# Patient Record
Sex: MALE | Race: WHITE | NOT HISPANIC OR LATINO | Employment: OTHER | URBAN - METROPOLITAN AREA
[De-identification: names, ages, dates, MRNs, and addresses within clinical notes are randomized per-mention and may not be internally consistent; named-entity substitution may affect disease eponyms.]

---

## 2017-01-06 ENCOUNTER — OFFICE VISIT (OUTPATIENT)
Dept: PULMONOLOGY | Facility: HOSPICE | Age: 72
End: 2017-01-06
Payer: MEDICARE

## 2017-01-06 VITALS
HEART RATE: 64 BPM | DIASTOLIC BLOOD PRESSURE: 82 MMHG | SYSTOLIC BLOOD PRESSURE: 130 MMHG | RESPIRATION RATE: 14 BRPM | WEIGHT: 166 LBS | BODY MASS INDEX: 25.16 KG/M2 | TEMPERATURE: 97.5 F | HEIGHT: 68 IN

## 2017-01-06 DIAGNOSIS — Z72.0 TOBACCO ABUSE: ICD-10-CM

## 2017-01-06 DIAGNOSIS — J43.2 CENTRILOBULAR EMPHYSEMA (HCC): ICD-10-CM

## 2017-01-06 DIAGNOSIS — G47.33 OSA (OBSTRUCTIVE SLEEP APNEA): ICD-10-CM

## 2017-01-06 PROCEDURE — 99406 BEHAV CHNG SMOKING 3-10 MIN: CPT | Performed by: NURSE PRACTITIONER

## 2017-01-06 PROCEDURE — 99214 OFFICE O/P EST MOD 30 MIN: CPT | Mod: 25 | Performed by: NURSE PRACTITIONER

## 2017-01-06 RX ORDER — DICLOFENAC SODIUM 75 MG/1
TABLET, DELAYED RELEASE ORAL
Refills: 6 | COMMUNITY
Start: 2016-10-20 | End: 2017-03-16

## 2017-01-06 RX ORDER — INFLUENZA A VIRUS A/BRISBANE/02/2018 IVR-190 (H1N1) ANTIGEN (FORMALDEHYDE INACTIVATED), INFLUENZA A VIRUS A/KANSAS/14/2017 X-327 (H3N2) ANTIGEN (FORMALDEHYDE INACTIVATED), INFLUENZA B VIRUS B/PHUKET/3073/2013 ANTIGEN (FORMALDEHYDE INACTIVATED), AND INFLUENZA B VIRUS B/MARYLAND/15/2016 BX-69A ANTIGEN (FORMALDEHYDE INACTIVATED) 15; 15; 15; 15 UG/.5ML; UG/.5ML; UG/.5ML; UG/.5ML
INJECTION, SUSPENSION INTRAMUSCULAR
COMMUNITY
Start: 2016-10-20 | End: 2017-07-06

## 2017-01-06 NOTE — PATIENT INSTRUCTIONS
1. Continue Symbicort 160/4.5 2 puffs BID, rinse mouth after use. Ok to call for samples.  2. Continue albuterol as needed,  3. Continue Spiriva daily  4. Referral to lung cancer screening program  5. Follow up in 6 months sooner if needed  6. Smoking cessation strongly encouraged

## 2017-01-06 NOTE — MR AVS SNAPSHOT
"        Fredy Gonsalez    2017 9:20 AM   Office Visit   MRN: [id number]    Department:  [facility]   Dept Phone:  [phone]    Description:  Male : 1945   Provider:  CHIVO Blanchard           Reason for Visit     Results CT      Allergies as of 2017     Allergen Noted Reactions    Diclofenac Sodium 2016   Unspecified    Made pt dizzy    Fish 2011         Vital Signs     Blood Pressure Pulse Temperature Respirations Height Weight    130/82 mmHg 64 36.4 °C (97.5 °F) (Temporal) 14 1.727 m (5' 8\") 75.297 kg (166 lb)    Body Mass Index Smoking Status                25.25 kg/m2 Current Every Day Smoker          Basic Information     Date Of Birth Sex Race Ethnicity Preferred Language    1945 Male White Non- English      Your appointments     2017  7:30 AM   Established Patient with Robert Lindo M.D.   [facility] ([facility])    [address] [facility] [id number] [phone]           You will be receiving a confirmation call a few days before your appointment from our automated call confirmation system.            2017  9:20 AM   Established Patient Pul with CHIVO Blanchard   [facility] (--)    [address] [facility] [address]  [facility] [id number] [phone]              Problem List              ICD-10-CM Priority Class Noted - Resolved    Arthritis M19.90   2011 - Present    Tobacco abuse Z72.0   2011 - Present    Hyperlipidemia E78.5   2012 - Present    COPD (chronic obstructive pulmonary disease) (HCC) J44.9   2012 - Present    AMIRA (obstructive sleep apnea) G47.33   2012 - Present    Dental caries K02.9   3/17/2015 - Present    Back pain M54.9   3/17/2015 - Present    Opioid type dependence, continuous (HCC) F11.20   2015 - Present    IFG (impaired fasting glucose) R73.01   3/18/2016 - Present    Chronic lumbar pain M54.5, G89.29   " 7/21/2016 - Present    Lung nodule < 6cm on CT R91.1   7/21/2016 - Present      Health Maintenance        Date Due Completion Dates    IMM ZOSTER VACCINE 11/13/2007 ---    IMM PNEUMOCOCCAL 65+ (ADULT) LOW/MEDIUM RISK SERIES (2 of 2 - PPSV23) 9/28/2016 9/28/2015    COLON CANCER SCREENING ANNUAL FIT 11/4/2017 11/4/2016 (Declined), 3/9/2015, 11/13/2013, 10/10/2012    Override on 11/4/2016: Patient Declined (pt decison)    IMM DTaP/Tdap/Td Vaccine (2 - Td) 12/30/2025 12/30/2015            Current Immunizations     13-VALENT PCV PREVNAR 9/28/2015    Influenza Vaccine Quad Inj (Preserved) 10/20/2016, 11/2/2015    Tdap Vaccine 12/30/2015      Below and/or attached are the medications your provider expects you to take. Review all of your home medications and newly ordered medications with your provider and/or pharmacist. Follow medication instructions as directed by your provider and/or pharmacist. Please keep your medication list with you and share with your provider. Update the information when medications are discontinued, doses are changed, or new medications (including over-the-counter products) are added; and carry medication information at all times in the event of emergency situations     Allergies:  DICLOFENAC SODIUM - Unspecified     FISH - (reactions not documented)               Medications  Valid as of: January 06, 2017 -  9:35 AM    Generic Name Brand Name Tablet Size Instructions for use    Albuterol Sulfate (Aero Soln) albuterol 108 (90 BASE) MCG/ACT Inhale 2 Puffs by mouth every four hours as needed for Shortness of Breath. Inhale 2 puffs every 4 hours as needed for shortness of breath.        Albuterol Sulfate (Aero Soln) albuterol 108 (90 BASE) MCG/ACT Inhale 2 Puffs by mouth every 6 hours as needed.        Budesonide-Formoterol Fumarate (Aerosol) SYMBICORT 160-4.5 MCG/ACT INHALE 2 PUFFS BY MOUTH TWICE DAILY        Diclofenac Sodium (Tablet Delayed Response) VOLTAREN 75 MG TK 1 T PO BID WF FOR ARTHRITIS  OR BACK PAIN        Fluticasone-Salmeterol (AEROSOL POWDER, BREATH ACTIVATED) ADVAIR DISKUS 250-50 MCG/DOSE INL 1 PUFF PO Q 12 H        Hydrocodone-Acetaminophen (Tab) NORCO 5-325 MG Take 1 Tab by mouth every 8 hours as needed. Prescription must last 30 days.        Influenza Vac Split Quad (Suspension) FLUZONE QUADRIVALENT          Meloxicam (Tab) MOBIC 7.5 MG Take 1 Tab by mouth every day.        Simvastatin (Tab) ZOCOR 40 MG Take 1 Tab by mouth every evening.        Tiotropium Bromide Monohydrate (Cap) SPIRIVA 18 MCG INHALE CONTENTS OF ONE CAPSULE BY MOUTH USING HANDIHALER        .                 Medicines prescribed today were sent to:     [facility] [address] [facility], [place] - [address]    [address] [facility] [address]    Phone: [phone] Fax: [phone]    Open 24 Hours?: Yes      Medication refill instructions:       If your prescription bottle indicates you have medication refills left, it is not necessary to call your provider’s office. Please contact your pharmacy and they will refill your medication.    If your prescription bottle indicates you do not have any refills left, you may request refills at any time through one of the following ways: The online [employer] system (except Urgent Care), by calling your provider’s office, or by asking your pharmacy to contact your provider’s office with a refill request. Medication refills are processed only during regular business hours and may not be available until the next business day. Your provider may request additional information or to have a follow-up visit with you prior to refilling your medication.   *Please Note: Medication refills are assigned a new Rx number when refilled electronically. Your pharmacy may indicate that no refills were authorized even though a new prescription for the same medication is available at the pharmacy. Please request the medicine by name with the pharmacy before contacting your  provider for a refill.        Other Notes About Your Plan     Pain contract signed 12/15/15  FALL RISK DONE 6/25/15 JAMAR Eller Status: Patient Declined

## 2017-01-06 NOTE — PROGRESS NOTES
Chief Complaint   Patient presents with   • Results     CT         HPI: This patient is a 71 y.o. male, who presents for CT results. Hx of COPD/emphysema, AMIRA, lung nodules. PFTs indicate an FEV1 of 2.64 L 86% predicted, FEV1 FVC ratio 63, TLC of 104% predicted, DLCO of 90% predicted with no significant bronchodilator response. He had a screening CT scan July 2014, which showed pulmonary nodules that have been followed by serial CTs.  CT scan September 13, 2016 shows a new ill-defined groundglass opacities and ground glass nodules. Emphysema also noted. Repeat CT scan to follow groundglass changes was completed on December 28, 2016 and show resolution of nodular groundglass opacity in the left lower lobe consistent with inflammatory process. All other nodules are stable ×2 years indicating benign process. The patient continues to smoke 1/2 pack a day. He has been counseled on smoking cessation in the past, this was again discussed with him today.  He has tried hypnosis twice, Chantix, nicotine patches, gum, he does not feel he is ready to quit at this time. He remains compliant with Symbicort 160/4.5, 2 puffs, twice a day, Spiriva daily and albuterol as needed. His breathing remains stable. Denies purulent cough, hemoptysis, wheeze, fever, chills, night sweats. Dyspnea remains baseline. He is up-to-date on influenza and pneumococcal vaccinations.    He has a history of AMIRA, intolerant to CPAP on 2L nocturnally, followed by Dr Hoover. I revisited the use of CPAP with him again today. He adamantly declines and will continue on oxygen nocturnally.    Past Medical History   Diagnosis Date   • Arthritis    • EMPHYSEMA    • Hyperlipidemia 4/17/2012   • COPD        Social History   Substance Use Topics   • Smoking status: Current Every Day Smoker -- 0.50 packs/day for 56 years     Types: Cigarettes   • Smokeless tobacco: Former User      Comment: down to 1 pack per week   • Alcohol Use: No      Comment: quit 28yrs ago  "      Family History   Problem Relation Age of Onset   • Arthritis Mother    • Lung Disease Mother    • Osteoporosis Mother    • Alzheimer's Disease Mother    • Other Mother      parkinsons   • Kidney Disease Father    • Cancer Neg Hx    • Diabetes Neg Hx    • Heart Disease Neg Hx    • Stroke Neg Hx        Current medications as of today   Current Outpatient Prescriptions   Medication Sig Dispense Refill   • diclofenac EC (VOLTAREN) 75 MG Tablet Delayed Response TK 1 T PO BID WF FOR ARTHRITIS OR BACK PAIN  6   • ADVAIR DISKUS 250-50 MCG/DOSE AEROSOL POWDER, BREATH ACTIVATED INL 1 PUFF PO Q 12 H  6   • FLUZONE QUADRIVALENT Suspension injection      • hydrocodone-acetaminophen (NORCO) 5-325 MG Tab per tablet Take 1 Tab by mouth every 8 hours as needed. Prescription must last 30 days. 90 Tab 0   • meloxicam (MOBIC) 7.5 MG Tab Take 1 Tab by mouth every day. 30 Tab 3   • albuterol (PROAIR HFA) 108 (90 BASE) MCG/ACT Aero Soln inhalation aerosol Inhale 2 Puffs by mouth every 6 hours as needed. 8.5 g 3   • tiotropium (SPIRIVA HANDIHALER) 18 MCG Cap INHALE CONTENTS OF ONE CAPSULE BY MOUTH USING HANDIHALER 30 Cap 6   • simvastatin (ZOCOR) 40 MG Tab Take 1 Tab by mouth every evening. 30 Tab 6   • albuterol 108 (90 BASE) MCG/ACT Aero Soln inhalation aerosol Inhale 2 Puffs by mouth every four hours as needed for Shortness of Breath. Inhale 2 puffs every 4 hours as needed for shortness of breath.     • budesonide-formoterol (SYMBICORT) 160-4.5 MCG/ACT Aerosol INHALE 2 PUFFS BY MOUTH TWICE DAILY 10.2 g 6     No current facility-administered medications for this visit.       Allergies: Diclofenac sodium and Fish    Blood pressure 130/82, pulse 64, temperature 36.4 °C (97.5 °F), temperature source Temporal, resp. rate 14, height 1.727 m (5' 8\"), weight 75.297 kg (166 lb).      ROS:   Constitutional: Denies fevers, chills, night sweats, weight loss. Positive for fatigue.  HEENT: Denies earache, difficulty hearing, tinnitus, nasal " congestion, hoarseness  Cardiovascular: Denies chest pain, tightness, palpitations, orthopnea or edema  Respiratory: See HPI  Sleep: Positive for restless sleep, frequent nocturnal awakenings  GI: Denies heartburn, dysphagia, nausea, abdominal pain, diarrhea or constipation  : Denies frequent urination, hematuria, discharge or painful urination  Musculoskeletal: Positive for chronic back pain.  Neurological: Denies weakness or headaches  Skin: No rashes    Physical exam:   Appearance: Well-nourished, well-developed, in no acute distress, cigarette odor  HEENT: Normocephalic, atraumatic, white sclera, PERRLA, oropharynx clear, poor dentition  Respiratory: no intercostal retractions or accessory muscle use   Lungs auscultation: Diminished breath sounds bilateral bases otherwise clear   Cardiovascular: Regular rate rhythm no murmurs, rubs or gallops  Gait: Normal  Digits: No clubbing, cyanosis. Positive for cigarette staining.  Motor: No focal deficits  Orientation: Oriented to time, person and place    Diagnosis:  1. Centrilobular emphysema (HCC)  REFERRAL TO LUNG CANCER SCREENING PROGRAM   2. Lung nodule < 6cm on CT  REFERRAL TO LUNG CANCER SCREENING PROGRAM   3. AMIRA (obstructive sleep apnea)     4. Tobacco abuse  REFERRAL TO LUNG CANCER SCREENING PROGRAM       Plan:  1. Continue Symbicort 160/4.5, 2 puffs, twice a day, rinse mouth after use. Patient will call for samples.  2. Continue Spiriva daily  3. Continue albuterol as needed  4. Patient is up-to-date on influenza and pneumococcal vaccinations  5. Smoking cessation again strongly encouraged  6. Patient declines use of CPAP, he will continue nocturnal oxygen at 2 L  7. Follow-up with PCP regularly. CT scan showed coronary artery calcification. Patient remains compliant with simvastatin daily.  8. Follow-up in 6 months, sooner if needed  9. I will refer patient to lung cancer screening program as he is a continued smoker he will require annual low-dose  screening CT. This was discussed with him today. He is amenable to this.

## 2017-01-12 ENCOUNTER — DOCUMENTATION (OUTPATIENT)
Dept: HEMATOLOGY ONCOLOGY | Facility: MEDICAL CENTER | Age: 72
End: 2017-01-12

## 2017-01-12 NOTE — NURSING NOTE
Received order for LCSP.  Chart review to assess for lung cancer screening program eligibility.   Age 55-80 yrs of age? Yes 71 y.o.  30 pack year hx of smoking, or greater? Yes 1 [id number]= 60 pkyr hx pt states he has smoked since 11 years old. States he used to smoke 3-4 packs per day but cut down to half a pack per day last year.   Current smoker? Yes    Any signs or symptoms of lung cancer? No   Previous history of lung cancer? No  Chest CT within past 12 mos.? Yes, chest CT performed on 12/28/2016  Patient does meet eligibility criteria, but not until 12/28/1017.

## 2017-01-20 ENCOUNTER — OFFICE VISIT (OUTPATIENT)
Dept: MEDICAL GROUP | Facility: MEDICAL CENTER | Age: 72
End: 2017-01-20
Attending: FAMILY MEDICINE
Payer: MEDICARE

## 2017-01-20 VITALS
SYSTOLIC BLOOD PRESSURE: 112 MMHG | RESPIRATION RATE: 18 BRPM | BODY MASS INDEX: 24.8 KG/M2 | DIASTOLIC BLOOD PRESSURE: 60 MMHG | HEART RATE: 87 BPM | TEMPERATURE: 97.9 F | WEIGHT: 163.6 LBS | HEIGHT: 68 IN | OXYGEN SATURATION: 95 %

## 2017-01-20 DIAGNOSIS — M54.50 CHRONIC MIDLINE LOW BACK PAIN WITHOUT SCIATICA: ICD-10-CM

## 2017-01-20 DIAGNOSIS — J43.1 PANLOBULAR EMPHYSEMA (HCC): ICD-10-CM

## 2017-01-20 DIAGNOSIS — G89.29 CHRONIC MIDLINE LOW BACK PAIN WITHOUT SCIATICA: ICD-10-CM

## 2017-01-20 DIAGNOSIS — M19.90 ARTHRITIS: ICD-10-CM

## 2017-01-20 DIAGNOSIS — E78.2 MIXED HYPERLIPIDEMIA: ICD-10-CM

## 2017-01-20 PROCEDURE — 99214 OFFICE O/P EST MOD 30 MIN: CPT | Performed by: FAMILY MEDICINE

## 2017-01-20 RX ORDER — BUDESONIDE AND FORMOTEROL FUMARATE DIHYDRATE 80; 4.5 UG/1; UG/1
2 AEROSOL RESPIRATORY (INHALATION) 2 TIMES DAILY
Qty: 1 INHALER | Refills: 11 | Status: SHIPPED | OUTPATIENT
Start: 2017-01-20 | End: 2019-03-11

## 2017-01-20 RX ORDER — ALBUTEROL SULFATE 90 UG/1
2 AEROSOL, METERED RESPIRATORY (INHALATION) EVERY 6 HOURS PRN
Qty: 8.5 G | Refills: 11 | Status: SHIPPED | OUTPATIENT
Start: 2017-01-20 | End: 2020-03-26 | Stop reason: SDUPTHER

## 2017-01-20 RX ORDER — HYDROCODONE BITARTRATE AND ACETAMINOPHEN 5; 325 MG/1; MG/1
1 TABLET ORAL EVERY 8 HOURS PRN
Qty: 90 TAB | Refills: 0 | Status: SHIPPED | OUTPATIENT
Start: 2017-01-20 | End: 2017-02-16 | Stop reason: SDUPTHER

## 2017-01-20 NOTE — ASSESSMENT & PLAN NOTE
Patient continues to report chronic bilateral lumbosacral pain without significant radiation into either leg. He reports if he walks a prolonged distance reflect his left leg will become weak but if he rests 5-10 minutes it recovers at strength. He is not reporting any numbness. There is no urinary or fecal incontinence. He is using Norco 5 mg about 3 times per day. Patient reports that his habit is to go for walks even though he has minimal back pain if he is very sedentary. Recent hip x-rays show mild arthritic changes in both hips along with both SI joints. In December patient had a normal ALEX and also had a lower extremity arterial scan which showed multiple areas of plaque but no significant stenotic sections.

## 2017-01-20 NOTE — MR AVS SNAPSHOT
"        Fredy Gonsalez JrBennett   2017 7:30 AM   Office Visit   MRN: [id number]    Department:  [facility]   Dept Phone:  [phone]    Description:  Male : 1945   Provider:  Robert Lindo M.D.           Reason for Visit     Results xray results/ follow up       Allergies as of 2017     Allergen Noted Reactions    Diclofenac Sodium 2016   Unspecified    Made pt dizzy    Fish 2011         You were diagnosed with     Panlobular emphysema (CMS-[facility])   [552816]       Mixed hyperlipidemia   [272.2.ICD-9-CM]       Chronic midline low back pain without sciatica   [9816391]       Arthritis   [812451]         Vital Signs     Blood Pressure Pulse Temperature Respirations Height Weight    112/60 mmHg 87 36.6 °C (97.9 °F) 18 1.727 m (5' 7.99\") 74.208 kg (163 lb 9.6 oz)    Body Mass Index Oxygen Saturation Smoking Status             24.88 kg/m2 95% Current Every Day Smoker         Basic Information     Date Of Birth Sex Race Ethnicity Preferred Language    1945 Male White Non- English      Your appointments     2017  7:30 AM   Established Patient with Robert Lindo M.D.   [facility] ([facility])    [address] [facility] [facility] [address] [address] [phone]           You will be receiving a confirmation call a few days before your appointment from our automated call confirmation system.            2017  9:20 AM   Established Patient Pul with CHIVO Blanchard   [facility] (--)    [address] [facility] [address]  [facility] [id number] [phone]              Problem List              ICD-10-CM Priority Class Noted - Resolved    Arthritis M19.90   2011 - Present    Tobacco abuse Z72.0   2011 - Present    Hyperlipidemia E78.5   2012 - Present    COPD (chronic obstructive pulmonary disease) (CMS-HCC) J44.9   2012 - Present    AMIRA (obstructive sleep apnea) G47.33   2012 - Present    Dental " caries K02.9   3/17/2015 - Present    Back pain M54.9   3/17/2015 - Present    Opioid type dependence, continuous (CMS-HCC) F11.20   6/25/2015 - Present    IFG (impaired fasting glucose) R73.01   3/18/2016 - Present    Chronic lumbar pain M54.5, G89.29   7/21/2016 - Present    Lung nodule < 6cm on CT R91.1   7/21/2016 - Present      Health Maintenance        Date Due Completion Dates    IMM ZOSTER VACCINE 11/13/2007 ---    IMM PNEUMOCOCCAL 65+ (ADULT) LOW/MEDIUM RISK SERIES (2 of 2 - PPSV23) 9/28/2016 9/28/2015    COLON CANCER SCREENING ANNUAL FIT 11/4/2017 11/4/2016 (Declined), 3/9/2015, 11/13/2013, 10/10/2012    Override on 11/4/2016: Patient Declined (pt decison)    IMM DTaP/Tdap/Td Vaccine (2 - Td) 12/30/2025 12/30/2015            Current Immunizations     13-VALENT PCV PREVNAR 9/28/2015    Influenza Vaccine Quad Inj (Preserved) 10/20/2016, 11/2/2015    Tdap Vaccine 12/30/2015      Below and/or attached are the medications your provider expects you to take. Review all of your home medications and newly ordered medications with your provider and/or pharmacist. Follow medication instructions as directed by your provider and/or pharmacist. Please keep your medication list with you and share with your provider. Update the information when medications are discontinued, doses are changed, or new medications (including over-the-counter products) are added; and carry medication information at all times in the event of emergency situations     Allergies:  DICLOFENAC SODIUM - Unspecified     FISH - (reactions not documented)               Medications  Valid as of: January 20, 2017 -  8:14 AM    Generic Name Brand Name Tablet Size Instructions for use    Albuterol Sulfate (Aero Soln) albuterol 108 (90 BASE) MCG/ACT Inhale 2 Puffs by mouth every four hours as needed for Shortness of Breath. Inhale 2 puffs every 4 hours as needed for shortness of breath.        Albuterol Sulfate (Aero Soln) albuterol 108 (90 BASE) MCG/ACT  Inhale 2 Puffs by mouth every 6 hours as needed.        Albuterol Sulfate (Aero Soln) albuterol 108 (90 BASE) MCG/ACT Inhale 2 Puffs by mouth every 6 hours as needed for Shortness of Breath.        Budesonide-Formoterol Fumarate (Aerosol) SYMBICORT 160-4.5 MCG/ACT INHALE 2 PUFFS BY MOUTH TWICE DAILY        Budesonide-Formoterol Fumarate (Aerosol) SYMBICORT 80-4.5 MCG/ACT Inhale 2 Puffs by mouth 2 Times a Day.        Diclofenac Sodium (Tablet Delayed Response) VOLTAREN 75 MG TK 1 T PO BID WF FOR ARTHRITIS OR BACK PAIN        Fluticasone-Salmeterol (AEROSOL POWDER, BREATH ACTIVATED) ADVAIR DISKUS 250-50 MCG/DOSE INL 1 PUFF PO Q 12 H        Hydrocodone-Acetaminophen (Tab) NORCO 5-325 MG Take 1 Tab by mouth every 8 hours as needed. Prescription must last 30 days.        Influenza Vac Split Quad (Suspension) FLUZONE QUADRIVALENT          Meloxicam (Tab) MOBIC 7.5 MG Take 1 Tab by mouth every day.        Simvastatin (Tab) ZOCOR 40 MG Take 1 Tab by mouth every evening.        Tiotropium Bromide Monohydrate (Cap) SPIRIVA 18 MCG INHALE CONTENTS OF ONE CAPSULE BY MOUTH USING HANDIHALER        .                 Medicines prescribed today were sent to:     [facility] [address] [place] [address] [address] [facility] [address]    Phone: [phone] Fax: [phone]    Open 24 Hours?: Yes      Medication refill instructions:       If your prescription bottle indicates you have medication refills left, it is not necessary to call your provider’s office. Please contact your pharmacy and they will refill your medication.    If your prescription bottle indicates you do not have any refills left, you may request refills at any time through one of the following ways: The online [employer] system (except Urgent Care), by calling your provider’s office, or by asking your pharmacy to contact your provider’s office with a refill request. Medication refills are processed only during  regular business hours and may not be available until the next business day. Your provider may request additional information or to have a follow-up visit with you prior to refilling your medication.   *Please Note: Medication refills are assigned a new Rx number when refilled electronically. Your pharmacy may indicate that no refills were authorized even though a new prescription for the same medication is available at the pharmacy. Please request the medicine by name with the pharmacy before contacting your provider for a refill.        Your To Do List     Future Labs/Procedures Complete By Expires    COMP METABOLIC PANEL  As directed 1/21/2018    LIPID PROFILE  As directed 1/21/2018      Other Notes About Your Plan     Pain contract signed 12/15/15  FALL RISK DONE 6/25/15 JAMAR Eller Status: Patient Declined

## 2017-01-20 NOTE — ASSESSMENT & PLAN NOTE
Patient is compliant taking simvastatin 40 mg daily. He is not reporting any chest pain or chest pressure. Last lipid panel 11 months ago showed excellent results. This will be updated

## 2017-01-20 NOTE — PROGRESS NOTES
Chief Complaint   Patient presents with   • Results     xray results/ follow up        HISTORY OF PRESENT ILLNESS: Patient is a 71 y.o. male established patient who presents today to follow-up on chronic low back pain exacerbated by exercise, hyperlipidemia, COPD/tobacco abuse        Chronic lumbar pain  Patient continues to report chronic bilateral lumbosacral pain without significant radiation into either leg. He reports if he walks a prolonged distance reflect his left leg will become weak but if he rests 5-10 minutes it recovers at strength. He is not reporting any numbness. There is no urinary or fecal incontinence. He is using Norco 5 mg about 3 times per day. Patient reports that his habit is to go for walks even though he has minimal back pain if he is very sedentary. Recent hip x-rays show mild arthritic changes in both hips along with both SI joints. In December patient had a normal ALEX and also had a lower extremity arterial scan which showed multiple areas of plaque but no significant stenotic sections.    Hyperlipidemia  Patient is compliant taking simvastatin 40 mg daily. He is not reporting any chest pain or chest pressure. Last lipid panel 11 months ago showed excellent results. This will be updated    COPD (chronic obstructive pulmonary disease)  Patient currently has had no luck reducing cigarette consumption down from 10 cigarettes per day. He is very uncomfortable wearing a CPAP mask and so is only wearing nighttime oxygen. He reports that he'll wake up several times a night only to urinate not due to dyspnea. He does have a daily cough with no current sputum production and no hemoptysis. His Advair and pro-air are going off formulary and will be replaced. He does not need daytime oxygen.      Patient Active Problem List    Diagnosis Date Noted   • Chronic lumbar pain 07/21/2016   • Lung nodule < 6cm on CT 07/21/2016   • IFG (impaired fasting glucose) 03/18/2016   • Opioid type dependence,  continuous (CMS-HCC) 06/25/2015   • Dental caries 03/17/2015   • Back pain 03/17/2015   • Hyperlipidemia 04/17/2012   • COPD (chronic obstructive pulmonary disease) (CMS-HCC) 04/17/2012   • AMIRA (obstructive sleep apnea) 04/17/2012   • Arthritis 11/11/2011   • Tobacco abuse 11/11/2011     Social history-single, not working  Allergies:Diclofenac sodium and Fish    Current Outpatient Prescriptions   Medication Sig Dispense Refill   • budesonide-formoterol (SYMBICORT) 80-4.5 MCG/ACT Aerosol Inhale 2 Puffs by mouth 2 Times a Day. 1 Inhaler 11   • albuterol 108 (90 BASE) MCG/ACT Aero Soln inhalation aerosol Inhale 2 Puffs by mouth every 6 hours as needed for Shortness of Breath. 8.5 g 11   • hydrocodone-acetaminophen (NORCO) 5-325 MG Tab per tablet Take 1 Tab by mouth every 8 hours as needed. Prescription must last 30 days. 90 Tab 0   • budesonide-formoterol (SYMBICORT) 160-4.5 MCG/ACT Aerosol INHALE 2 PUFFS BY MOUTH TWICE DAILY 10.2 g 6   • diclofenac EC (VOLTAREN) 75 MG Tablet Delayed Response TK 1 T PO BID WF FOR ARTHRITIS OR BACK PAIN  6   • ADVAIR DISKUS 250-50 MCG/DOSE AEROSOL POWDER, BREATH ACTIVATED INL 1 PUFF PO Q 12 H  6   • FLUZONE QUADRIVALENT Suspension injection      • meloxicam (MOBIC) 7.5 MG Tab Take 1 Tab by mouth every day. 30 Tab 3   • albuterol (PROAIR HFA) 108 (90 BASE) MCG/ACT Aero Soln inhalation aerosol Inhale 2 Puffs by mouth every 6 hours as needed. 8.5 g 3   • tiotropium (SPIRIVA HANDIHALER) 18 MCG Cap INHALE CONTENTS OF ONE CAPSULE BY MOUTH USING HANDIHALER 30 Cap 6   • simvastatin (ZOCOR) 40 MG Tab Take 1 Tab by mouth every evening. 30 Tab 6   • albuterol 108 (90 BASE) MCG/ACT Aero Soln inhalation aerosol Inhale 2 Puffs by mouth every four hours as needed for Shortness of Breath. Inhale 2 puffs every 4 hours as needed for shortness of breath.       No current facility-administered medications for this visit.       Social History   Substance Use Topics   • Smoking status: Current Every Day  "Smoker -- 0.50 packs/day for 56 years     Types: Cigarettes   • Smokeless tobacco: Former User      Comment: down to 1 pack per week   • Alcohol Use: No      Comment: quit 28yrs ago       Family History   Problem Relation Age of Onset   • Arthritis Mother    • Lung Disease Mother    • Osteoporosis Mother    • Alzheimer's Disease Mother    • Other Mother      parkinsons   • Kidney Disease Father    • Cancer Neg Hx    • Diabetes Neg Hx    • Heart Disease Neg Hx    • Stroke Neg Hx        ROS:  Review of Systems   Constitutional: Negative for fever, chills, weight loss and malaise/fatigue.   Eyes: Negative for blurred vision.   Respiratory: Negative for cough, sputum production, shortness of breath and wheezing.    Cardiovascular: Negative for chest pain, palpitations, orthopnea and leg swelling.   Gastrointestinal: Negative for heartburn, nausea, vomiting and abdominal pain.   Endo/Heme/Allergies: Does not bruise/bleed easily.               Exam:  Blood pressure 112/60, pulse 87, temperature 36.6 °C (97.9 °F), resp. rate 18, height 1.727 m (5' 7.99\"), weight 74.208 kg (163 lb 9.6 oz), SpO2 95 %.  General:  Well nourished, well developed male in NAD  Head is grossly normal.  Neck: Supple without JVD or bruit. Thyroid is not enlarged. Trachea is midline.  Pulmonary: Clear to ausculation .  Normal effort. No rales, ronchi, or wheezing.  Cardiovascular: Regular rate and rhythm without murmur.  Abdomen-Abdomen is soft, normal bowel sounds, no masses, guarding, ororganomegaly, or tenderness.  Lower extremities- neg for pretibial edema, redness, tenderness.      Please note that this dictation was created using voice recognition software. I have made every reasonable attempt to correct obvious errors, but I expect that there are errors of grammar and possibly content that I did not discover before finalizing the note.    Assessment/Plan:  1. Panlobular emphysema (CMS-HCC)     2. Mixed hyperlipidemia  COMP METABOLIC PANEL    " LIPID PROFILE   3. Chronic midline low back pain without sciatica     4. Arthritis  hydrocodone-acetaminophen (NORCO) 5-325 MG Tab per tablet      Plan: 1. Rx Symbicort 80/4.52 puff twice daily  2. Rx Ventolin albuterol inhaler to replace Pro Air  3. Patient is encouraged to reduce down to 9 cigarettes per day  4. Fasting lipids and CMP prior to next visit-1 month Nexium OTC 24-hour brand only the right

## 2017-01-20 NOTE — ASSESSMENT & PLAN NOTE
Patient currently has had no luck reducing cigarette consumption down from 10 cigarettes per day. He is very uncomfortable wearing a CPAP mask and so is only wearing nighttime oxygen. He reports that he'll wake up several times a night only to urinate not due to dyspnea. He does have a daily cough with no current sputum production and no hemoptysis. His Advair and pro-air are going off formulary and will be replaced. He does not need daytime oxygen.

## 2017-01-25 ENCOUNTER — HOSPITAL ENCOUNTER (OUTPATIENT)
Dept: LAB | Facility: MEDICAL CENTER | Age: 72
End: 2017-01-25
Attending: FAMILY MEDICINE
Payer: MEDICARE

## 2017-01-25 DIAGNOSIS — E78.2 MIXED HYPERLIPIDEMIA: ICD-10-CM

## 2017-01-25 LAB
ALBUMIN SERPL BCP-MCNC: 4.2 G/DL (ref 3.2–4.9)
ALBUMIN/GLOB SERPL: 1.4 G/DL
ALP SERPL-CCNC: 55 U/L (ref 30–99)
ALT SERPL-CCNC: 13 U/L (ref 2–50)
ANION GAP SERPL CALC-SCNC: 3 MMOL/L (ref 0–11.9)
AST SERPL-CCNC: 12 U/L (ref 12–45)
BILIRUB SERPL-MCNC: 0.4 MG/DL (ref 0.1–1.5)
BUN SERPL-MCNC: 16 MG/DL (ref 8–22)
CALCIUM SERPL-MCNC: 9.2 MG/DL (ref 8.5–10.5)
CHLORIDE SERPL-SCNC: 109 MMOL/L (ref 96–112)
CHOLEST SERPL-MCNC: 143 MG/DL (ref 100–199)
CO2 SERPL-SCNC: 28 MMOL/L (ref 20–33)
CREAT SERPL-MCNC: 1.12 MG/DL (ref 0.5–1.4)
GLOBULIN SER CALC-MCNC: 3.1 G/DL (ref 1.9–3.5)
GLUCOSE SERPL-MCNC: 117 MG/DL (ref 65–99)
HDLC SERPL-MCNC: 41 MG/DL
LDLC SERPL CALC-MCNC: 92 MG/DL
POTASSIUM SERPL-SCNC: 4.4 MMOL/L (ref 3.6–5.5)
PROT SERPL-MCNC: 7.3 G/DL (ref 6–8.2)
SODIUM SERPL-SCNC: 140 MMOL/L (ref 135–145)
TRIGL SERPL-MCNC: 52 MG/DL (ref 0–149)

## 2017-01-25 PROCEDURE — 80053 COMPREHEN METABOLIC PANEL: CPT

## 2017-01-25 PROCEDURE — 80061 LIPID PANEL: CPT

## 2017-01-25 PROCEDURE — 36415 COLL VENOUS BLD VENIPUNCTURE: CPT

## 2017-01-27 ENCOUNTER — TELEPHONE (OUTPATIENT)
Dept: MEDICAL GROUP | Facility: MEDICAL CENTER | Age: 72
End: 2017-01-27

## 2017-01-28 NOTE — TELEPHONE ENCOUNTER
Phone Number Called: [phone] (home)       Message: Pt informed: Laboratory is notable for modest elevation of sugar at 117 this is been seen on occasion in the past. Would be careful about avoiding sweets and limiting quantities of starches. Cholesterol is very good at 143 LDL good at 92. Normal liver and kidney function    Left Message for patient to call back: N\A

## 2017-01-31 ENCOUNTER — TELEPHONE (OUTPATIENT)
Dept: HEMATOLOGY ONCOLOGY | Facility: MEDICAL CENTER | Age: 72
End: 2017-01-31

## 2017-01-31 RX ORDER — BUDESONIDE AND FORMOTEROL FUMARATE DIHYDRATE 160; 4.5 UG/1; UG/1
AEROSOL RESPIRATORY (INHALATION)
Qty: 10.2 G | Refills: 3 | Status: SHIPPED | OUTPATIENT
Start: 2017-01-31 | End: 2019-03-11

## 2017-01-31 NOTE — TELEPHONE ENCOUNTER
Left voicemail for patient requesting a return call to schedule shared decision making visit for lung screening program with GABBIE Schneider. Ref:Adriana Landers, Dx:Centrilobular emphysema ,Lung nodule Tobacco abuse , If and When patient call please contact me ( Kanwal Yusuf MA [phone])

## 2017-02-16 ENCOUNTER — OFFICE VISIT (OUTPATIENT)
Dept: MEDICAL GROUP | Facility: MEDICAL CENTER | Age: 72
End: 2017-02-16
Attending: FAMILY MEDICINE
Payer: MEDICARE

## 2017-02-16 ENCOUNTER — OFFICE VISIT (OUTPATIENT)
Dept: HEMATOLOGY ONCOLOGY | Facility: MEDICAL CENTER | Age: 72
End: 2017-02-16
Payer: MEDICARE

## 2017-02-16 VITALS
BODY MASS INDEX: 26.23 KG/M2 | RESPIRATION RATE: 12 BRPM | SYSTOLIC BLOOD PRESSURE: 140 MMHG | DIASTOLIC BLOOD PRESSURE: 68 MMHG | TEMPERATURE: 97.9 F | HEART RATE: 75 BPM | HEIGHT: 67 IN | WEIGHT: 167.1 LBS | OXYGEN SATURATION: 97 %

## 2017-02-16 VITALS
DIASTOLIC BLOOD PRESSURE: 72 MMHG | SYSTOLIC BLOOD PRESSURE: 104 MMHG | RESPIRATION RATE: 16 BRPM | WEIGHT: 167 LBS | HEART RATE: 84 BPM | OXYGEN SATURATION: 97 % | TEMPERATURE: 97.3 F | HEIGHT: 68 IN | BODY MASS INDEX: 25.31 KG/M2

## 2017-02-16 DIAGNOSIS — Z72.0 TOBACCO ABUSE: ICD-10-CM

## 2017-02-16 DIAGNOSIS — G89.29 CHRONIC HIP PAIN, UNSPECIFIED LATERALITY: ICD-10-CM

## 2017-02-16 DIAGNOSIS — M19.90 ARTHRITIS: ICD-10-CM

## 2017-02-16 DIAGNOSIS — F17.210 CIGARETTE SMOKER: ICD-10-CM

## 2017-02-16 DIAGNOSIS — J43.1 PANLOBULAR EMPHYSEMA (HCC): ICD-10-CM

## 2017-02-16 DIAGNOSIS — M25.552 BILATERAL HIP PAIN: ICD-10-CM

## 2017-02-16 DIAGNOSIS — M25.559 CHRONIC HIP PAIN, UNSPECIFIED LATERALITY: ICD-10-CM

## 2017-02-16 DIAGNOSIS — M25.551 BILATERAL HIP PAIN: ICD-10-CM

## 2017-02-16 PROCEDURE — 99214 OFFICE O/P EST MOD 30 MIN: CPT | Performed by: FAMILY MEDICINE

## 2017-02-16 PROCEDURE — 1101F PT FALLS ASSESS-DOCD LE1/YR: CPT | Performed by: FAMILY MEDICINE

## 2017-02-16 PROCEDURE — G0296 VISIT TO DETERM LDCT ELIG: HCPCS | Performed by: NURSE PRACTITIONER

## 2017-02-16 PROCEDURE — 4004F PT TOBACCO SCREEN RCVD TLK: CPT | Performed by: FAMILY MEDICINE

## 2017-02-16 PROCEDURE — 3017F COLORECTAL CA SCREEN DOC REV: CPT | Mod: 1P | Performed by: FAMILY MEDICINE

## 2017-02-16 PROCEDURE — G8420 CALC BMI NORM PARAMETERS: HCPCS | Performed by: FAMILY MEDICINE

## 2017-02-16 PROCEDURE — 4040F PNEUMOC VAC/ADMIN/RCVD: CPT | Performed by: FAMILY MEDICINE

## 2017-02-16 PROCEDURE — 99213 OFFICE O/P EST LOW 20 MIN: CPT | Performed by: FAMILY MEDICINE

## 2017-02-16 PROCEDURE — G8432 DEP SCR NOT DOC, RNG: HCPCS | Performed by: FAMILY MEDICINE

## 2017-02-16 PROCEDURE — G8482 FLU IMMUNIZE ORDER/ADMIN: HCPCS | Performed by: FAMILY MEDICINE

## 2017-02-16 RX ORDER — HYDROCODONE BITARTRATE AND ACETAMINOPHEN 5; 325 MG/1; MG/1
1 TABLET ORAL EVERY 8 HOURS PRN
Qty: 90 TAB | Refills: 0 | Status: SHIPPED | OUTPATIENT
Start: 2017-02-16 | End: 2017-03-16 | Stop reason: SDUPTHER

## 2017-02-16 ASSESSMENT — ENCOUNTER SYMPTOMS
CHILLS: 0
FEVER: 0
WEIGHT LOSS: 0
HEMOPTYSIS: 0
WHEEZING: 0
SPUTUM PRODUCTION: 0
COUGH: 1
SHORTNESS OF BREATH: 1

## 2017-02-16 ASSESSMENT — PAIN SCALES - GENERAL
PAINLEVEL: 3=SLIGHT PAIN
PAINLEVEL: NO PAIN

## 2017-02-16 NOTE — ASSESSMENT & PLAN NOTE
Patient reports bilateral hip pain if he stands for more than 5-10 minutes or walks more than 2-3 blocks. Reports his exercise tolerance is slowly decreasing. Reports the left hip is perhaps slightly worse than the right. He has previously had a lumbar MRI which did show some moderate central spinal canal stenosis and moderate bilateral foraminal stenosis at the L4-5 level on a MRI in 2015 ordered by Dr. Busch. He had epidural steroids injections which did not help and a course of PT. More recently has had a normal ankle-brachial index and a arterial ultrasound showing only mild plaque and stenosis. Plain hip x-rays are notable for mild to moderate changes of the lateral hip arthritic change. Reports pain will be from his lower back consistently just around to the lateral aspect of both hip. Does not report pain with activity either in the muscles of his calves or thighs. Is not reporting lower leg numbness, urinary incontinence, fecal incontinence.

## 2017-02-16 NOTE — ASSESSMENT & PLAN NOTE
Patient has dramatically reduced his tobacco dependence is now at 4 cigarettes per day. Notes some early morning cough with some clear sputum production but no significant wheezing and no hemoptysis.

## 2017-02-16 NOTE — MR AVS SNAPSHOT
"        Fredy Gonsalez    2017 7:30 AM   Office Visit   MRN: [id number]    Department:  [facility]   Dept Phone:  [phone]    Description:  Male : 1945   Provider:  Robert Lindo M.D.           Reason for Visit     Follow-Up           Allergies as of 2017     Allergen Noted Reactions    Diclofenac Sodium 2016   Unspecified    Made pt dizzy    Fish 2011         You were diagnosed with     Chronic hip pain, unspecified laterality   [920425]       Panlobular emphysema (CMS-HCC)   [727693]       Tobacco abuse   [758456]       Bilateral hip pain   [949077]       Arthritis   [894331]         Vital Signs     Blood Pressure Pulse Temperature Respirations Height Weight    104/72 mmHg 84 36.3 °C (97.3 °F) 16 1.727 m (5' 7.99\") 75.751 kg (167 lb)    Body Mass Index Oxygen Saturation Smoking Status             25.40 kg/m2 97% Current Every Day Smoker         Basic Information     Date Of Birth Sex Race Ethnicity Preferred Language    1945 Male White Non- English      Your appointments     2017  9:00 AM   Int Lung CA Screen with CHIVO Schneider   [facility] (--)    [address], [address]  [facility] [address] [phone]            Mar 16, 2017  7:30 AM   Established Patient with Robert Lindo M.D.   [facility] ([facility])    [address]  [facility] [address] [phone]           You will be receiving a confirmation call a few days before your appointment from our automated call confirmation system.            2017  9:20 AM   Established Patient Pul with CHIVO Blanchard   [facility] (--)    [address]  [address]  [facility] [address] [phone]              Problem List              ICD-10-CM Priority Class Noted - Resolved    Arthritis M19.90   2011 - Present    Tobacco abuse Z72.0   2011 - Present    Hyperlipidemia E78.5   2012 - " Present    COPD (chronic obstructive pulmonary disease) (CMS-HCC) J44.9   4/17/2012 - Present    AMIRA (obstructive sleep apnea) G47.33   4/17/2012 - Present    Dental caries K02.9   3/17/2015 - Present    Back pain M54.9   3/17/2015 - Present    Opioid type dependence, continuous (CMS-HCC) F11.20   6/25/2015 - Present    IFG (impaired fasting glucose) R73.01   3/18/2016 - Present    Chronic lumbar pain M54.5, G89.29   7/21/2016 - Present    Lung nodule < 6cm on CT R91.1   7/21/2016 - Present    Bilateral hip pain M25.551, M25.552   2/16/2017 - Present      Health Maintenance        Date Due Completion Dates    IMM ZOSTER VACCINE 11/13/2007 ---    IMM PNEUMOCOCCAL 65+ (ADULT) LOW/MEDIUM RISK SERIES (2 of 2 - PPSV23) 9/28/2016 9/28/2015    COLON CANCER SCREENING ANNUAL FIT 11/4/2017 11/4/2016 (Declined), 3/9/2015, 11/13/2013, 10/10/2012    Override on 11/4/2016: Patient Declined (pt decison)    IMM DTaP/Tdap/Td Vaccine (2 - Td) 12/30/2025 12/30/2015            Current Immunizations     13-VALENT PCV PREVNAR 9/28/2015    Influenza Vaccine Quad Inj (Preserved) 10/20/2016, 11/2/2015    Tdap Vaccine 12/30/2015      Below and/or attached are the medications your provider expects you to take. Review all of your home medications and newly ordered medications with your provider and/or pharmacist. Follow medication instructions as directed by your provider and/or pharmacist. Please keep your medication list with you and share with your provider. Update the information when medications are discontinued, doses are changed, or new medications (including over-the-counter products) are added; and carry medication information at all times in the event of emergency situations     Allergies:  DICLOFENAC SODIUM - Unspecified     FISH - (reactions not documented)               Medications  Valid as of: February 16, 2017 -  7:59 AM    Generic Name Brand Name Tablet Size Instructions for use    Albuterol Sulfate (Aero Soln) albuterol 108  (90 BASE) MCG/ACT Inhale 2 Puffs by mouth every four hours as needed for Shortness of Breath. Inhale 2 puffs every 4 hours as needed for shortness of breath.        Albuterol Sulfate (Aero Soln) albuterol 108 (90 BASE) MCG/ACT Inhale 2 Puffs by mouth every 6 hours as needed.        Albuterol Sulfate (Aero Soln) albuterol 108 (90 BASE) MCG/ACT Inhale 2 Puffs by mouth every 6 hours as needed for Shortness of Breath.        Budesonide-Formoterol Fumarate (Aerosol) SYMBICORT 80-4.5 MCG/ACT Inhale 2 Puffs by mouth 2 Times a Day.        Budesonide-Formoterol Fumarate (Aerosol) SYMBICORT 160-4.5 MCG/ACT INHALE 2 PUFFS BY MOUTH TWICE DAILY        Diclofenac Sodium (Tablet Delayed Response) VOLTAREN 75 MG TK 1 T PO BID WF FOR ARTHRITIS OR BACK PAIN        Fluticasone-Salmeterol (AEROSOL POWDER, BREATH ACTIVATED) ADVAIR DISKUS 250-50 MCG/DOSE INL 1 PUFF PO Q 12 H        Hydrocodone-Acetaminophen (Tab) NORCO 5-325 MG Take 1 Tab by mouth every 8 hours as needed. Prescription must last 30 days.        Influenza Vac Split Quad (Suspension) FLUZONE QUADRIVALENT          Meloxicam (Tab) MOBIC 7.5 MG Take 1 Tab by mouth every day.        Simvastatin (Tab) ZOCOR 40 MG Take 1 Tab by mouth every evening.        Tiotropium Bromide Monohydrate (Cap) SPIRIVA 18 MCG INHALE CONTENTS OF ONE CAPSULE BY MOUTH USING HANDIHALER        .                 Medicines prescribed today were sent to:     [facility] [address] [place] [address] [address] [facility]    [address] [facility] [address]    Phone: [phone] Fax: [phone]    Open 24 Hours?: Yes      Medication refill instructions:       If your prescription bottle indicates you have medication refills left, it is not necessary to call your provider’s office. Please contact your pharmacy and they will refill your medication.    If your prescription bottle indicates you do not have any refills left, you may request refills at any time through one of the  following ways: The online [employer] system (except Urgent Care), by calling your provider’s office, or by asking your pharmacy to contact your provider’s office with a refill request. Medication refills are processed only during regular business hours and may not be available until the next business day. Your provider may request additional information or to have a follow-up visit with you prior to refilling your medication.   *Please Note: Medication refills are assigned a new Rx number when refilled electronically. Your pharmacy may indicate that no refills were authorized even though a new prescription for the same medication is available at the pharmacy. Please request the medicine by name with the pharmacy before contacting your provider for a refill.        Referral     A referral request has been sent to our patient care coordination department. Please allow 3-5 business days for us to process this request and contact you either by phone or mail. If you do not hear from us by the 5th business day, please call us at [phone].        Other Notes About Your Plan     Pain contract signed 12/15/15  FALL RISK DONE 6/25/15 RB           [employer] Status: Patient Declined

## 2017-02-16 NOTE — MR AVS SNAPSHOT
"        Fredy Gonsalez JrBennett   2017 9:00 AM   Office Visit   MRN: [id number]    Department:  [facility]   Dept Phone:  [phone]    Description:  Male : 1945   Provider:  CHIVO Schneider           Reason for Visit     New Patient lung screening      Allergies as of 2017     Allergen Noted Reactions    Diclofenac Sodium 2016   Unspecified    Made pt dizzy    Fish 2011         Vital Signs     Blood Pressure Pulse Temperature Respirations Height Weight    140/68 mmHg 75 36.6 °C (97.9 °F) 12 1.702 m (5' 7\") 75.796 kg (167 lb 1.6 oz)    Body Mass Index Oxygen Saturation Smoking Status             26.17 kg/m2 97% Current Every Day Smoker         Basic Information     Date Of Birth Sex Race Ethnicity Preferred Language    1945 Male White Non- English      Your appointments     2017  9:00 AM   Int Lung CA Screen with CHIVO Schneider   [facility] (--)    [address] [facility] [address]  [facility] [address] [phone]            Mar 16, 2017  7:30 AM   Established Patient with Robert Lindo M.D.   [facility] ([facility])    [address] [facility] [id number] [phone]           You will be receiving a confirmation call a few days before your appointment from our automated call confirmation system.            2017  9:20 AM   Established Patient Pul with CHIVO Blanchard   [facility] (--)    [address]  [facility] [address]  [facility] [address] [phone]              Problem List              ICD-10-CM Priority Class Noted - Resolved    Arthritis M19.90   2011 - Present    Tobacco abuse Z72.0   2011 - Present    Hyperlipidemia E78.5   2012 - Present    COPD (chronic obstructive pulmonary disease) (CMS-HCC) J44.9   2012 - Present    AMIRA (obstructive sleep apnea) G47.33   2012 - Present    Dental caries K02.9   3/17/2015 - Present    Back pain M54.9   " 3/17/2015 - Present    Opioid type dependence, continuous (CMS-HCC) F11.20   6/25/2015 - Present    IFG (impaired fasting glucose) R73.01   3/18/2016 - Present    Chronic lumbar pain M54.5, G89.29   7/21/2016 - Present    Lung nodule < 6cm on CT R91.1   7/21/2016 - Present    Bilateral hip pain M25.551, M25.552   2/16/2017 - Present      Health Maintenance        Date Due Completion Dates    IMM ZOSTER VACCINE 11/13/2007 ---    IMM PNEUMOCOCCAL 65+ (ADULT) LOW/MEDIUM RISK SERIES (2 of 2 - PPSV23) 9/28/2016 9/28/2015    COLON CANCER SCREENING ANNUAL FIT 11/4/2017 11/4/2016 (Declined), 3/9/2015, 11/13/2013, 10/10/2012    Override on 11/4/2016: Patient Declined (pt decison)    IMM DTaP/Tdap/Td Vaccine (2 - Td) 12/30/2025 12/30/2015            Current Immunizations     13-VALENT PCV PREVNAR 9/28/2015    Influenza Vaccine Quad Inj (Preserved) 10/20/2016, 11/2/2015    Tdap Vaccine 12/30/2015      Below and/or attached are the medications your provider expects you to take. Review all of your home medications and newly ordered medications with your provider and/or pharmacist. Follow medication instructions as directed by your provider and/or pharmacist. Please keep your medication list with you and share with your provider. Update the information when medications are discontinued, doses are changed, or new medications (including over-the-counter products) are added; and carry medication information at all times in the event of emergency situations     Allergies:  DICLOFENAC SODIUM - Unspecified     FISH - (reactions not documented)               Medications  Valid as of: February 16, 2017 -  8:37 AM    Generic Name Brand Name Tablet Size Instructions for use    Albuterol Sulfate (Aero Soln) albuterol 108 (90 BASE) MCG/ACT Inhale 2 Puffs by mouth every four hours as needed for Shortness of Breath. Inhale 2 puffs every 4 hours as needed for shortness of breath.        Albuterol Sulfate (Aero Soln) albuterol 108 (90 BASE)  MCG/ACT Inhale 2 Puffs by mouth every 6 hours as needed.        Albuterol Sulfate (Aero Soln) albuterol 108 (90 BASE) MCG/ACT Inhale 2 Puffs by mouth every 6 hours as needed for Shortness of Breath.        Budesonide-Formoterol Fumarate (Aerosol) SYMBICORT 80-4.5 MCG/ACT Inhale 2 Puffs by mouth 2 Times a Day.        Budesonide-Formoterol Fumarate (Aerosol) SYMBICORT 160-4.5 MCG/ACT INHALE 2 PUFFS BY MOUTH TWICE DAILY        Diclofenac Sodium (Tablet Delayed Response) VOLTAREN 75 MG TK 1 T PO BID WF FOR ARTHRITIS OR BACK PAIN        Fluticasone-Salmeterol (AEROSOL POWDER, BREATH ACTIVATED) ADVAIR DISKUS 250-50 MCG/DOSE INL 1 PUFF PO Q 12 H        Hydrocodone-Acetaminophen (Tab) NORCO 5-325 MG Take 1 Tab by mouth every 8 hours as needed. Prescription must last 30 days.        Influenza Vac Split Quad (Suspension) FLUZONE QUADRIVALENT          Meloxicam (Tab) MOBIC 7.5 MG Take 1 Tab by mouth every day.        Simvastatin (Tab) ZOCOR 40 MG Take 1 Tab by mouth every evening.        Tiotropium Bromide Monohydrate (Cap) SPIRIVA 18 MCG INHALE CONTENTS OF ONE CAPSULE BY MOUTH USING HANDIHALER        .                 Medicines prescribed today were sent to:     [facility] [address] [place] [address] [address] [facility] [address]    Phone: [phone] Fax: [phone]    Open 24 Hours?: Yes      Medication refill instructions:       If your prescription bottle indicates you have medication refills left, it is not necessary to call your provider’s office. Please contact your pharmacy and they will refill your medication.    If your prescription bottle indicates you do not have any refills left, you may request refills at any time through one of the following ways: The online [employer] system (except Urgent Care), by calling your provider’s office, or by asking your pharmacy to contact your provider’s office with a refill request. Medication refills are processed only  during regular business hours and may not be available until the next business day. Your provider may request additional information or to have a follow-up visit with you prior to refilling your medication.   *Please Note: Medication refills are assigned a new Rx number when refilled electronically. Your pharmacy may indicate that no refills were authorized even though a new prescription for the same medication is available at the pharmacy. Please request the medicine by name with the pharmacy before contacting your provider for a refill.        Other Notes About Your Plan     Pain contract signed 12/15/15  FALL RISK DONE 6/25/15 JAMAR Eller Status: Patient Declined

## 2017-02-16 NOTE — PROGRESS NOTES
Subjective:   Date of Consultation:2/16/2017  8:24 AM  Primary care physician:Robert Lindo M.D.    Thank you for allowing me to participate in his care, I will continue to follow closely.   Patient seen today for initial lung cancer screening visit. Patient referred by Adriana CARTWRIGHT.    The patient meets eligibility criteria including age, smoking history (30+ pack years), if former smoker, quit in the last 15 years, and absence of signs or symptoms of lung cancer.    - Age - 71  - Smoking history - Patient has smoked for 60 years at an average of 1 ppd = 60 pack year smoking history.  - Current smoking status - Current smoker.  Patient has tried multiple interventions but none were effective.  - No symptoms of lung cancer and no previous history of lung cancer           Past Medical History   Diagnosis Date   • Arthritis    • EMPHYSEMA    • Hyperlipidemia 4/17/2012   • COPD      Past Surgical History   Procedure Laterality Date   • Open reduction       left arm. 32 yrs ago     Allergies   Allergen Reactions   • Diclofenac Sodium Unspecified     Made pt dizzy   • Fish      Outpatient Encounter Prescriptions as of 2/16/2017   Medication Sig Dispense Refill   • hydrocodone-acetaminophen (NORCO) 5-325 MG Tab per tablet Take 1 Tab by mouth every 8 hours as needed. Prescription must last 30 days. 90 Tab 0   • SYMBICORT 160-4.5 MCG/ACT Aerosol INHALE 2 PUFFS BY MOUTH TWICE DAILY 10.2 g 3   • budesonide-formoterol (SYMBICORT) 80-4.5 MCG/ACT Aerosol Inhale 2 Puffs by mouth 2 Times a Day. 1 Inhaler 11   • albuterol 108 (90 BASE) MCG/ACT Aero Soln inhalation aerosol Inhale 2 Puffs by mouth every 6 hours as needed for Shortness of Breath. 8.5 g 11   • ADVAIR DISKUS 250-50 MCG/DOSE AEROSOL POWDER, BREATH ACTIVATED INL 1 PUFF PO Q 12 H  6   • meloxicam (MOBIC) 7.5 MG Tab Take 1 Tab by mouth every day. 30 Tab 3   • albuterol (PROAIR HFA) 108 (90 BASE) MCG/ACT Aero Soln inhalation aerosol Inhale 2 Puffs by mouth  "every 6 hours as needed. 8.5 g 3   • tiotropium (SPIRIVA HANDIHALER) 18 MCG Cap INHALE CONTENTS OF ONE CAPSULE BY MOUTH USING HANDIHALER 30 Cap 6   • simvastatin (ZOCOR) 40 MG Tab Take 1 Tab by mouth every evening. 30 Tab 6   • albuterol 108 (90 BASE) MCG/ACT Aero Soln inhalation aerosol Inhale 2 Puffs by mouth every four hours as needed for Shortness of Breath. Inhale 2 puffs every 4 hours as needed for shortness of breath.     • diclofenac EC (VOLTAREN) 75 MG Tablet Delayed Response TK 1 T PO BID WF FOR ARTHRITIS OR BACK PAIN  6   • FLUZONE QUADRIVALENT Suspension injection        No facility-administered encounter medications on file as of 2/16/2017.        History   Smoking status   • Current Every Day Smoker -- 0.50 packs/day for 56 years   • Types: Cigarettes   Smokeless tobacco   • Former User     Comment: down to 1 pack per week             Review of Systems   Constitutional: Negative for fever, chills, weight loss and malaise/fatigue.   Respiratory: Positive for cough (non-productive coughing in the morning.) and shortness of breath (if patient walks too fast). Negative for hemoptysis, sputum production and wheezing.         Objective:   /68 mmHg  Pulse 75  Temp(Src) 36.6 °C (97.9 °F)  Resp 12  Ht 1.702 m (5' 7\")  Wt 75.796 kg (167 lb 1.6 oz)  BMI 26.17 kg/m2  SpO2 97%    Physical Exam   Constitutional: He is oriented to person, place, and time. No distress.   Patient not in acute distress   HENT:   Head: Normocephalic and atraumatic.   Mouth/Throat: Oropharynx is clear and moist and mucous membranes are normal. No oral lesions.   Eyes: EOM are normal. Pupils are equal, round, and reactive to light.   Neck: Normal range of motion. Neck supple.   Cardiovascular: Normal rate and regular rhythm.  Exam reveals no gallop and no friction rub.    Pulmonary/Chest: Effort normal. He has no decreased breath sounds. He has no wheezes.   B/l decreased at the bases   Abdominal: Soft. Bowel sounds are " normal. He exhibits no distension and no mass. There is no hepatosplenomegaly, splenomegaly or hepatomegaly. There is no tenderness.   Musculoskeletal: Normal range of motion. He exhibits no edema.   Lymphadenopathy:     He has no cervical adenopathy.     He has no axillary adenopathy.        Right: No inguinal and no supraclavicular adenopathy present.        Left: No inguinal and no supraclavicular adenopathy present.   Neurological: He is alert and oriented to person, place, and time. He has normal strength. No cranial nerve deficit. Gait normal.   Skin: He is not diaphoretic. No cyanosis.   Nursing note and vitals reviewed.      Assessment:     1. Cigarette smoker       We conducted a shared decision-making process using a decision aid. We reviewed benefits and harms of screening, including false positives and potential need for additional diagnostic testing, the possibility of over diagnosis, and total radiation exposure.    We discussed the importance of adhering to annual LDCT screening. We also discussed the impact of comorbities on the patient's the ability or willingness to undergo diagnostic procedure(s) and treatment.    Counseling on the importance of maintaining cigarette smoking abstinence if former smoker; or the importance of smoking cessation if current smoker and, if appropriate, furnishing of information about tobacco cessation interventions.    Based on our discussion, we have decided to begin annual lung cancer screening starting now.  Medical Decision Making:  Today's Assessment / Status / Plan:     Patient's last CT scan of the chest 12/28/16.  Patient will need to wait 12/28/17 before low dose CT scan can be scheduled.

## 2017-02-16 NOTE — ASSESSMENT & PLAN NOTE
Patient does not wear oxygen. Reports normal breathing day today other than occasional dyspnea with stairs. He is compliant using his Symbicort, and Spiriva. Reports that he rarely has to use his emergency rescue albuterol inhaler. Also is receiving pulmonary follow-up for COPD and a woman or a nodule.

## 2017-02-16 NOTE — PROGRESS NOTES
Chief Complaint   Patient presents with   • Follow-Up       HISTORY OF PRESENT ILLNESS: Patient is a 71 y.o. male established patient who presents today to for follow-up on COPD, bilateral hip pain, tobacco abuse counseling.        COPD (chronic obstructive pulmonary disease)  Patient does not wear oxygen. Reports normal breathing day today other than occasional dyspnea with stairs. He is compliant using his Symbicort, and Spiriva. Reports that he rarely has to use his emergency rescue albuterol inhaler. Also is receiving pulmonary follow-up for COPD and a woman or a nodule.    Bilateral hip pain  Patient reports bilateral hip pain if he stands for more than 5-10 minutes or walks more than 2-3 blocks. Reports his exercise tolerance is slowly decreasing. Reports the left hip is perhaps slightly worse than the right. He has previously had a lumbar MRI which did show some moderate central spinal canal stenosis and moderate bilateral foraminal stenosis at the L4-5 level on a MRI in 2015 ordered by Dr. Busch. He had epidural steroids injections which did not help and a course of PT. More recently has had a normal ankle-brachial index and a arterial ultrasound showing only mild plaque and stenosis. Plain hip x-rays are notable for mild to moderate changes of the lateral hip arthritic change. Reports pain will be from his lower back consistently just around to the lateral aspect of both hip. Does not report pain with activity either in the muscles of his calves or thighs. Is not reporting lower leg numbness, urinary incontinence, fecal incontinence.     Tobacco abuse    Patient has dramatically reduced his tobacco dependence is now at 4 cigarettes per day. Notes some early morning cough with some clear sputum production but no significant wheezing and no hemoptysis.  Patient Active Problem List    Diagnosis Date Noted   • Bilateral hip pain 02/16/2017   • Chronic lumbar pain 07/21/2016   • Lung nodule < 6cm on CT  07/21/2016   • IFG (impaired fasting glucose) 03/18/2016   • Opioid type dependence, continuous (CMS-HCC) 06/25/2015   • Dental caries 03/17/2015   • Back pain 03/17/2015   • Hyperlipidemia 04/17/2012   • COPD (chronic obstructive pulmonary disease) (CMS-HCC) 04/17/2012   • AMIRA (obstructive sleep apnea) 04/17/2012   • Arthritis 11/11/2011   • Tobacco abuse 11/11/2011       Allergies:Diclofenac sodium and Fish    Current Outpatient Prescriptions   Medication Sig Dispense Refill   • SYMBICORT 160-4.5 MCG/ACT Aerosol INHALE 2 PUFFS BY MOUTH TWICE DAILY 10.2 g 3   • budesonide-formoterol (SYMBICORT) 80-4.5 MCG/ACT Aerosol Inhale 2 Puffs by mouth 2 Times a Day. 1 Inhaler 11   • albuterol 108 (90 BASE) MCG/ACT Aero Soln inhalation aerosol Inhale 2 Puffs by mouth every 6 hours as needed for Shortness of Breath. 8.5 g 11   • hydrocodone-acetaminophen (NORCO) 5-325 MG Tab per tablet Take 1 Tab by mouth every 8 hours as needed. Prescription must last 30 days. 90 Tab 0   • diclofenac EC (VOLTAREN) 75 MG Tablet Delayed Response TK 1 T PO BID WF FOR ARTHRITIS OR BACK PAIN  6   • ADVAIR DISKUS 250-50 MCG/DOSE AEROSOL POWDER, BREATH ACTIVATED INL 1 PUFF PO Q 12 H  6   • FLUZONE QUADRIVALENT Suspension injection      • meloxicam (MOBIC) 7.5 MG Tab Take 1 Tab by mouth every day. 30 Tab 3   • albuterol (PROAIR HFA) 108 (90 BASE) MCG/ACT Aero Soln inhalation aerosol Inhale 2 Puffs by mouth every 6 hours as needed. 8.5 g 3   • tiotropium (SPIRIVA HANDIHALER) 18 MCG Cap INHALE CONTENTS OF ONE CAPSULE BY MOUTH USING HANDIHALER 30 Cap 6   • simvastatin (ZOCOR) 40 MG Tab Take 1 Tab by mouth every evening. 30 Tab 6   • albuterol 108 (90 BASE) MCG/ACT Aero Soln inhalation aerosol Inhale 2 Puffs by mouth every four hours as needed for Shortness of Breath. Inhale 2 puffs every 4 hours as needed for shortness of breath.       No current facility-administered medications for this visit.    Social History-single, not working    Social History  "  Substance Use Topics   • Smoking status: Current Every Day Smoker -- 0.50 packs/day for 56 years     Types: Cigarettes   • Smokeless tobacco: Former User      Comment: down to 1 pack per week   • Alcohol Use: No      Comment: quit 28yrs ago       Family History   Problem Relation Age of Onset   • Arthritis Mother    • Lung Disease Mother    • Osteoporosis Mother    • Alzheimer's Disease Mother    • Other Mother      parkinsons   • Kidney Disease Father    • Cancer Neg Hx    • Diabetes Neg Hx    • Heart Disease Neg Hx    • Stroke Neg Hx        ROS:  Review of Systems   Constitutional: Negative for fever, chills, weight loss and malaise/fatigue.   Eyes: Negative for blurred vision.   Respiratory: Negative for cough, sputum production, shortness of breath and wheezing.    Cardiovascular: Negative for chest pain, palpitations, orthopnea and leg swelling.   Gastrointestinal: Negative for heartburn, nausea, vomiting and abdominal pain.   Endo/Heme/Allergies: Does not bruise/bleed easily.               Exam:  Blood pressure 104/72, pulse 84, temperature 36.3 °C (97.3 °F), resp. rate 16, height 1.727 m (5' 7.99\"), weight 75.751 kg (167 lb), SpO2 97 %.  General:  Well nourished, well developed male in NAD  Head is grossly normal.  Neck: Supple without JVD or bruit. Thyroid is not enlarged. Trachea is midline.  Pulmonary: Clear to ausculation .  Normal effort. No rales, ronchi, or wheezing.  Cardiovascular: Regular rate and rhythm without murmur.  Abdomen-Abdomen is soft, normal bowel sounds, no masses, guarding, ororganomegaly, or tenderness.  Lower extremities- neg for pretibial edema, redness. 1+ tender bilateral SI areas wrapping around to the lateral aspect of the trochanteric region.  Back-1+ tender L4-S4 in the midline and paraspinous areas bilaterally  Data review-lumbar MRI 2015, ALEX 2016, arterial lower extremity ultrasounds 2016    Please note that this dictation was created using voice recognition software. I " have made every reasonable attempt to correct obvious errors, but I expect that there are errors of grammar and possibly content that I did not discover before finalizing the note.    Assessment/Plan:  1. Chronic hip pain, unspecified laterality  REFERRAL TO ORTHOPEDICS   2. Panlobular emphysema (CMS-HCC)     3. Tobacco abuse     4.  Bilateral Hip Pain       Plan: 1. Renew Norco 5/325 up to 3 times daily  2. Continue on diclofenac  3. Continued pulmonary follow-up  4. Orthopedic referral to evaluate bilateral hip pain and question of whether this is arising from his hip joints  5. Patient encouraged to decrease tobacco down to 3 cigarettes per day when possible

## 2017-03-02 ENCOUNTER — TELEPHONE (OUTPATIENT)
Dept: MEDICAL GROUP | Facility: MEDICAL CENTER | Age: 72
End: 2017-03-02

## 2017-03-16 ENCOUNTER — OFFICE VISIT (OUTPATIENT)
Dept: MEDICAL GROUP | Facility: MEDICAL CENTER | Age: 72
End: 2017-03-16
Attending: FAMILY MEDICINE
Payer: MEDICARE

## 2017-03-16 VITALS
OXYGEN SATURATION: 96 % | HEIGHT: 67 IN | DIASTOLIC BLOOD PRESSURE: 64 MMHG | WEIGHT: 166 LBS | RESPIRATION RATE: 16 BRPM | HEART RATE: 88 BPM | SYSTOLIC BLOOD PRESSURE: 128 MMHG | BODY MASS INDEX: 26.06 KG/M2 | TEMPERATURE: 97.5 F

## 2017-03-16 DIAGNOSIS — M19.90 ARTHRITIS: ICD-10-CM

## 2017-03-16 PROCEDURE — 3017F COLORECTAL CA SCREEN DOC REV: CPT | Mod: 1P | Performed by: FAMILY MEDICINE

## 2017-03-16 PROCEDURE — 99214 OFFICE O/P EST MOD 30 MIN: CPT | Performed by: FAMILY MEDICINE

## 2017-03-16 PROCEDURE — G8420 CALC BMI NORM PARAMETERS: HCPCS | Performed by: FAMILY MEDICINE

## 2017-03-16 PROCEDURE — G8432 DEP SCR NOT DOC, RNG: HCPCS | Performed by: FAMILY MEDICINE

## 2017-03-16 PROCEDURE — G8482 FLU IMMUNIZE ORDER/ADMIN: HCPCS | Performed by: FAMILY MEDICINE

## 2017-03-16 PROCEDURE — 4004F PT TOBACCO SCREEN RCVD TLK: CPT | Performed by: FAMILY MEDICINE

## 2017-03-16 PROCEDURE — 4040F PNEUMOC VAC/ADMIN/RCVD: CPT | Performed by: FAMILY MEDICINE

## 2017-03-16 PROCEDURE — 1101F PT FALLS ASSESS-DOCD LE1/YR: CPT | Performed by: FAMILY MEDICINE

## 2017-03-16 PROCEDURE — 99212 OFFICE O/P EST SF 10 MIN: CPT | Performed by: FAMILY MEDICINE

## 2017-03-16 RX ORDER — HYDROCODONE BITARTRATE AND ACETAMINOPHEN 5; 325 MG/1; MG/1
1 TABLET ORAL EVERY 8 HOURS PRN
Qty: 90 TAB | Refills: 0 | Status: SHIPPED | OUTPATIENT
Start: 2017-03-16 | End: 2017-03-16 | Stop reason: SDUPTHER

## 2017-03-16 RX ORDER — HYDROCODONE BITARTRATE AND ACETAMINOPHEN 5; 325 MG/1; MG/1
1 TABLET ORAL EVERY 8 HOURS PRN
Qty: 90 TAB | Refills: 0 | Status: SHIPPED | OUTPATIENT
Start: 2017-03-16 | End: 2017-06-16 | Stop reason: SDUPTHER

## 2017-03-16 NOTE — PROGRESS NOTES
Chief Complaint/HPI:  Fredy Gonsalez . is a 71 y.o.  who presents for follow up/evaluation of chronic pain and medication management.    Location of pain  1. Bilat low back  2.   3.       Location  1( as above) 2( as above) 3 ( as above)   Onset, When did your symptoms start? 2011     Quality: constant or intermittent intermittent     Descriptors: aching, burning, throbbing,shooting,sharp,stabbing,squeezing,pressure,soreness aching     Changed since last visit? no     Present level(1-10), average in last week 7     Best level in the last week 5     Worst level in the last week 7     Worsens with: walking     Improves with: Lying supine     Associated symptoms achey L leg     Is the amount of pain relief you are now obtaining from your current pain relievers enough to make a real difference in your life? Are you satisfied with your current level of pain control? yes           Since last assessment level of function(ADL) has    Physical Functioning: not changed   Mood:not changed     Family Relationships:not changed   Sleep pattern:improved   Social Relationships:not changed   Overall functioning:not changed     Potential aberrant behavior Unkempt/impaired:  No Changes route of administration:  No Abusing alcohol or illicit drugs:  No    Involved in accident:  No Use medication in response to situational stress:  No Purposeful over sedation:  No      Requesting early renewals:  No Insists on certain med by name:  No Negative mood change:  No    Appears intoxicated:  No Multiple prescribers:  No Reports lost or stolen meds:  No    Hoarding Medication:  No Arrested by police:  No Contact with street culture:  No               Comments:           ROS  Any side effects from current medications?          Other therapies tried Date Outcome/Comments/Notes   Nausea:none   Physical Therapy     Vomiting:none   Occupational Therapy     Constipation:none   Accupunture     Itching:none   Behavioral Therapy     Mental  "status changes:none Tens Unit     Sweating:none   Pain Management Referral     Fatigue:none        Drowsiness:none   Previous Imaging/work up     Overall severity of side effects:none            Current medications, problem list, allergies, tobacco and alcohol use were reviewed in [facility] today      PHYSICAL EXAMINATION:  /64 mmHg  Pulse 88  Temp(Src) 36.4 °C (97.5 °F)  Resp 16  Ht 1.702 m (5' 7\")  Wt 75.297 kg (166 lb)  BMI 25.99 kg/m2  SpO2 96%  General: Well-developed, well-nourished,well kept, no apparent distress, pleasant and cooperative with the examination.  Skin: Warm and dry. No rashes or lesions in visible areas.  Cor: Regular rate and rhythm without murmur gallop or rub.  Lungs: Clear to auscultation with equal breath sounds bilaterally. No wheezes, rhonchi.  Extremities: No cyanosis, clubbing or edema  Musculoskeletal: 1+ tenderness to palpation L4-S4, and parasacral areas bilaterally.  Neuro: Intact light touch and strength both lower extremities  Psychiatric: Alert and oriented x3, Normal affect and mood.    Aware reviewed no, scanned into EPIC.      ASSESSMENT/Plan:  1. Arthritis -recent orthopedic evaluation returned a diagnosis of low back pain with some extension into the lateral hip areas. Patient completed a course of physical therapy about 1-2 years ago and is still doing stretches and using a exercise ball. He also is compliant taking meloxicam once daily with no GI upset. Is stable and his use of Norco 5 mg 3 times a day  2. COPD-patient ports cigarette use has crept back up to 5 cigarettes per day. Notes some shortness of breath with activity and morning cough. He is not a candidate for screening chest CT for one year due to recent chest CTs.  - hydrocodone-acetaminophen (NORCO) 5-325 MG Tab per tablet; Take 1 Tab by mouth every 8 hours as needed. Prescription must last 30 days.  Dispense: 90 Tab; Refill: 0     Plan: 1. Patient is given 3 separate 1 month Norco prescriptions " today with revisit in 90 days or sooner if needed  2. Encouraged to reduce cigarettes use

## 2017-03-16 NOTE — MR AVS SNAPSHOT
"        Fredy Bell Wallace Cruz   3/16/2017 7:30 AM   Office Visit   MRN: [id number]    [facility]   Dept Phone:  [phone]    Description:  Male : 1945   Provider:  Robert Lindo M.D.           Reason for Visit     Follow-Up           Allergies as of 3/16/2017     Allergen Noted Reactions    Diclofenac Sodium 2016   Unspecified    Made pt dizzy    Fish 2011         You were diagnosed with     Arthritis   [[id number]]         Vital Signs     Blood Pressure Pulse Temperature Respirations Height Weight    128/64 mmHg 88 36.4 °C (97.5 °F) 16 1.702 m (5' 7\") 75.297 kg (166 lb)    Body Mass Index Oxygen Saturation Smoking Status             25.99 kg/m2 96% Current Every Day Smoker         Basic Information     Date Of Birth Sex Race Ethnicity Preferred Language    1945 Male White Non- English      Your appointments     2017  9:20 AM   Established Patient Pul with CHIVO Blanchard   [facility] (--)    [address]   [address]  [facility] [id number] [phone]              Problem List              ICD-10-CM Priority Class Noted - Resolved    Arthritis M19.90   2011 - Present    Tobacco abuse Z72.0   2011 - Present    Hyperlipidemia E78.5   2012 - Present    COPD (chronic obstructive pulmonary disease) (CMS-HCC) J44.9   2012 - Present    AMIRA (obstructive sleep apnea) G47.33   2012 - Present    Dental caries K02.9   3/17/2015 - Present    Back pain M54.9   3/17/2015 - Present    Opioid type dependence, continuous (CMS-HCC) F11.20   2015 - Present    IFG (impaired fasting glucose) R73.01   3/18/2016 - Present    Chronic lumbar pain M54.5, G89.29   2016 - Present    Lung nodule < 6cm on CT R91.1   2016 - Present    Bilateral hip pain M25.551, M25.552   2017 - Present      Health Maintenance        Date Due Completion Dates    IMM ZOSTER VACCINE 2007 ---    IMM " PNEUMOCOCCAL 65+ (ADULT) LOW/MEDIUM RISK SERIES (2 of 2 - PPSV23) 9/28/2016 9/28/2015    COLON CANCER SCREENING ANNUAL FIT 11/4/2017 11/4/2016 (Declined), 3/9/2015, 11/13/2013, 10/10/2012    Override on 11/4/2016: Patient Declined (pt decison)    IMM DTaP/Tdap/Td Vaccine (2 - Td) 12/30/2025 12/30/2015            Current Immunizations     13-VALENT PCV PREVNAR 9/28/2015    Influenza Vaccine Quad Inj (Preserved) 10/20/2016, 11/2/2015    Tdap Vaccine 12/30/2015      Below and/or attached are the medications your provider expects you to take. Review all of your home medications and newly ordered medications with your provider and/or pharmacist. Follow medication instructions as directed by your provider and/or pharmacist. Please keep your medication list with you and share with your provider. Update the information when medications are discontinued, doses are changed, or new medications (including over-the-counter products) are added; and carry medication information at all times in the event of emergency situations     Allergies:  DICLOFENAC SODIUM - Unspecified     FISH - (reactions not documented)               Medications  Valid as of: March 16, 2017 -  8:12 AM    Generic Name Brand Name Tablet Size Instructions for use    Albuterol Sulfate (Aero Soln) albuterol 108 (90 BASE) MCG/ACT Inhale 2 Puffs by mouth every four hours as needed for Shortness of Breath. Inhale 2 puffs every 4 hours as needed for shortness of breath.        Albuterol Sulfate (Aero Soln) albuterol 108 (90 BASE) MCG/ACT Inhale 2 Puffs by mouth every 6 hours as needed.        Albuterol Sulfate (Aero Soln) albuterol 108 (90 BASE) MCG/ACT Inhale 2 Puffs by mouth every 6 hours as needed for Shortness of Breath.        Budesonide-Formoterol Fumarate (Aerosol) SYMBICORT 80-4.5 MCG/ACT Inhale 2 Puffs by mouth 2 Times a Day.        Budesonide-Formoterol Fumarate (Aerosol) SYMBICORT 160-4.5 MCG/ACT INHALE 2 PUFFS BY MOUTH TWICE DAILY         Fluticasone-Salmeterol (AEROSOL POWDER, BREATH ACTIVATED) ADVAIR DISKUS 250-50 MCG/DOSE INL 1 PUFF PO Q 12 H        Hydrocodone-Acetaminophen (Tab) NORCO 5-325 MG Take 1 Tab by mouth every 8 hours as needed. Prescription must last 30 days.        Influenza Vac Split Quad (Suspension) FLUZONE QUADRIVALENT          Meloxicam (Tab) MOBIC 7.5 MG Take 1 Tab by mouth every day.        Simvastatin (Tab) ZOCOR 40 MG Take 1 Tab by mouth every evening.        Tiotropium Bromide Monohydrate (Cap) SPIRIVA 18 MCG INHALE CONTENTS OF ONE CAPSULE BY MOUTH USING HANDIHALER        .                 Medicines prescribed today were sent to:     [facility] [address] [place] [address], [place] - [address] [address] [address] [facility] [address]    Phone: [phone] Fax: [phone]    Open 24 Hours?: Yes      Medication refill instructions:       If your prescription bottle indicates you have medication refills left, it is not necessary to call your provider’s office. Please contact your pharmacy and they will refill your medication.    If your prescription bottle indicates you do not have any refills left, you may request refills at any time through one of the following ways: The online [employer] system (except Urgent Care), by calling your provider’s office, or by asking your pharmacy to contact your provider’s office with a refill request. Medication refills are processed only during regular business hours and may not be available until the next business day. Your provider may request additional information or to have a follow-up visit with you prior to refilling your medication.   *Please Note: Medication refills are assigned a new Rx number when refilled electronically. Your pharmacy may indicate that no refills were authorized even though a new prescription for the same medication is available at the pharmacy. Please request the medicine by name with the pharmacy before contacting your provider for a  refill.        Other Notes About Your Plan     Pain contract signed 12/15/15  FALL RISK DONE 6/25/15 JAMAR Eller Status: Patient Declined        Quit Tobacco Information     Do you want to quit using tobacco?    Quitting tobacco decreases risks of cancer, heart and lung disease, increases life expectancy, improves sense of taste and smell, and increases spending money, among other benefits.    If you are thinking about quitting, we can help.  • [facility] Quit Tobacco Program: 965.220.5802  o Program occurs weekly for four weeks and includes pharmacist consultation on products to support quitting smoking or chewing tobacco. A provider referral is needed for pharmacist consultation.  • Tobacco Users Help Hotline: 0-811-QUIT-NOW (927-1700) or https://[place].quitlogix.org/  o Free, confidential telephone and online coaching for [place] residents. Sessions are designed on a schedule that is convenient for you. Eligible clients receive free nicotine replacement therapy.  • Nationally: www.smokefree.gov  o Information and professional assistance to support both immediate and long-term needs as you become, and remain, a non-smoker. Smokefree.gov allows you to choose the help that best fits your needs.

## 2017-03-17 RX ORDER — MELOXICAM 7.5 MG/1
TABLET ORAL
Qty: 30 TAB | Refills: 3 | Status: SHIPPED | OUTPATIENT
Start: 2017-03-17 | End: 2017-06-16 | Stop reason: SDUPTHER

## 2017-03-31 RX ORDER — SIMVASTATIN 40 MG
TABLET ORAL
Qty: 30 TAB | Refills: 11 | Status: SHIPPED | OUTPATIENT
Start: 2017-03-31 | End: 2018-04-30

## 2017-06-16 ENCOUNTER — OFFICE VISIT (OUTPATIENT)
Dept: MEDICAL GROUP | Facility: MEDICAL CENTER | Age: 72
End: 2017-06-16
Attending: FAMILY MEDICINE
Payer: MEDICARE

## 2017-06-16 VITALS
OXYGEN SATURATION: 93 % | HEIGHT: 67 IN | TEMPERATURE: 98 F | WEIGHT: 166 LBS | RESPIRATION RATE: 16 BRPM | SYSTOLIC BLOOD PRESSURE: 118 MMHG | BODY MASS INDEX: 26.06 KG/M2 | HEART RATE: 100 BPM | DIASTOLIC BLOOD PRESSURE: 56 MMHG

## 2017-06-16 DIAGNOSIS — J43.1 PANLOBULAR EMPHYSEMA (HCC): ICD-10-CM

## 2017-06-16 DIAGNOSIS — M54.50 CHRONIC MIDLINE LOW BACK PAIN WITHOUT SCIATICA: ICD-10-CM

## 2017-06-16 DIAGNOSIS — Z72.0 TOBACCO ABUSE: ICD-10-CM

## 2017-06-16 DIAGNOSIS — M19.90 ARTHRITIS: ICD-10-CM

## 2017-06-16 DIAGNOSIS — G89.29 CHRONIC MIDLINE LOW BACK PAIN WITHOUT SCIATICA: ICD-10-CM

## 2017-06-16 PROCEDURE — 99212 OFFICE O/P EST SF 10 MIN: CPT | Performed by: FAMILY MEDICINE

## 2017-06-16 PROCEDURE — 99214 OFFICE O/P EST MOD 30 MIN: CPT | Performed by: FAMILY MEDICINE

## 2017-06-16 RX ORDER — HYDROCODONE BITARTRATE AND ACETAMINOPHEN 5; 325 MG/1; MG/1
1 TABLET ORAL EVERY 8 HOURS PRN
Qty: 90 TAB | Refills: 0 | Status: SHIPPED | OUTPATIENT
Start: 2017-06-16 | End: 2017-09-08 | Stop reason: SDUPTHER

## 2017-06-16 RX ORDER — HYDROCODONE BITARTRATE AND ACETAMINOPHEN 5; 325 MG/1; MG/1
1 TABLET ORAL EVERY 8 HOURS PRN
Qty: 90 TAB | Refills: 0 | Status: SHIPPED | OUTPATIENT
Start: 2017-06-16 | End: 2017-06-16 | Stop reason: SDUPTHER

## 2017-06-16 RX ORDER — MELOXICAM 7.5 MG/1
TABLET ORAL
Qty: 30 TAB | Refills: 11 | Status: SHIPPED | OUTPATIENT
Start: 2017-06-16 | End: 2019-01-28

## 2017-06-16 ASSESSMENT — PAIN SCALES - GENERAL: PAINLEVEL: 8=MODERATE-SEVERE PAIN

## 2017-06-16 NOTE — PROGRESS NOTES
Chief Complaint   Patient presents with   • Follow-Up       HISTORY OF PRESENT ILLNESS: Patient is a 71 y.o. male established patient who presents today to follow-up on chronic lumbar/hip pain, COPD, tobacco abuse        Chronic lumbar pain  Patient continues to experience low back pain with strong radiation of both hips with any significant amount of walking. Reports significant hip pain walking the 6 blocks or so from his apartment to our office this morning. He did complete a orthopedic evaluation for his hip pain. Plain x-rays of the hip were unremarkable. They felt that he was having mainly referred pain from his moderate degenerative lumbar arthritis driving the hip pain. Patient reports that his walking is very limited. Not reporting any urinary or fecal incontinence. He is continuing to take Norco 5 mg 3 times daily. Urine drug screen was appropriate in January 2017.    COPD (chronic obstructive pulmonary disease)  Patient notes brief early morning cough with clearing up of small amount of clear sputum. Not reporting any hemoptysis or wheezing.    Tobacco abuse  Patient reports smoking has increased since her last visit now up to about half a pack of cigarettes per day. Reports that he smokes mainly out of boredom because he cannot walk. He does not have very many healthy teeth left and so chewing gum is not an option. Reports home oximetry tends to run 97-98% in room air. He does have oxygen at home for nocturnal use. Denies current chest pain or chest pressure.      Patient Active Problem List    Diagnosis Date Noted   • Bilateral hip pain 02/16/2017   • Chronic lumbar pain 07/21/2016   • Lung nodule < 6cm on CT 07/21/2016   • IFG (impaired fasting glucose) 03/18/2016   • Opioid type dependence, continuous (CMS-HCC) 06/25/2015   • Dental caries 03/17/2015   • Back pain 03/17/2015   • Hyperlipidemia 04/17/2012   • COPD (chronic obstructive pulmonary disease) (CMS-HCC) 04/17/2012   • AMIRA (obstructive sleep  apnea) 04/17/2012   • Arthritis 11/11/2011   • Tobacco abuse 11/11/2011      Social history-single, not working  Allergies:Diclofenac sodium and Fish    Current Outpatient Prescriptions   Medication Sig Dispense Refill   • meloxicam (MOBIC) 7.5 MG Tab TAKE 1 TABLET BY MOUTH DAILY 30 Tab 11   • hydrocodone-acetaminophen (NORCO) 5-325 MG Tab per tablet Take 1 Tab by mouth every 8 hours as needed. Prescription must last 30 days. 90 Tab 0   • simvastatin (ZOCOR) 40 MG Tab TAKE 1 TABLET BY MOUTH EVERY EVENING 30 Tab 11   • SYMBICORT 160-4.5 MCG/ACT Aerosol INHALE 2 PUFFS BY MOUTH TWICE DAILY 10.2 g 3   • budesonide-formoterol (SYMBICORT) 80-4.5 MCG/ACT Aerosol Inhale 2 Puffs by mouth 2 Times a Day. 1 Inhaler 11   • albuterol 108 (90 BASE) MCG/ACT Aero Soln inhalation aerosol Inhale 2 Puffs by mouth every 6 hours as needed for Shortness of Breath. 8.5 g 11   • ADVAIR DISKUS 250-50 MCG/DOSE AEROSOL POWDER, BREATH ACTIVATED INL 1 PUFF PO Q 12 H  6   • FLUZONE QUADRIVALENT Suspension injection      • albuterol (PROAIR HFA) 108 (90 BASE) MCG/ACT Aero Soln inhalation aerosol Inhale 2 Puffs by mouth every 6 hours as needed. 8.5 g 3   • tiotropium (SPIRIVA HANDIHALER) 18 MCG Cap INHALE CONTENTS OF ONE CAPSULE BY MOUTH USING HANDIHALER 30 Cap 6   • albuterol 108 (90 BASE) MCG/ACT Aero Soln inhalation aerosol Inhale 2 Puffs by mouth every four hours as needed for Shortness of Breath. Inhale 2 puffs every 4 hours as needed for shortness of breath.       No current facility-administered medications for this visit.       Social History   Substance Use Topics   • Smoking status: Current Every Day Smoker -- 0.20 packs/day for 60 years     Types: Cigarettes     Start date: 06/15/1957   • Smokeless tobacco: Former User      Comment: down to 1 pack per week   • Alcohol Use: No      Comment: quit 28yrs ago       Family History   Problem Relation Age of Onset   • Arthritis Mother    • Lung Disease Mother    • Osteoporosis Mother    •  "Alzheimer's Disease Mother    • Other Mother      parkinsons   • Kidney Disease Father    • Cancer Neg Hx    • Diabetes Neg Hx    • Heart Disease Neg Hx    • Stroke Neg Hx        ROS:  Review of Systems   Constitutional: Negative for fever, chills, weight loss and malaise/fatigue.   Eyes: Negative for blurred vision. .    Cardiovascular: Negative for chest pain, palpitations, orthopnea and leg swelling.   Gastrointestinal: Negative for heartburn, nausea, vomiting and abdominal pain.   Endo/Heme/Allergies: Does not bruise/bleed easily.               Exam:  Blood pressure 118/56, pulse 100, temperature 36.7 °C (98 °F), resp. rate 16, height 1.702 m (5' 7\"), weight 75.297 kg (166 lb), SpO2 93 %.  General:  Well nourished, well developed male in NAD  Head is grossly normal.  Neck: Supple without JVD or bruit. Thyroid is not enlarged. Trachea is midline.  Pulmonary: Clear to ausculation .  Normal effort. No rales, ronchi, or wheezing.  Cardiovascular: Regular rate and rhythm without murmur.  Abdomen-Abdomen is soft, normal bowel sounds, no masses, guarding, ororganomegaly, or tenderness.  Lower extremities- neg for pretibial edema, redness, tenderness. Intact light touch bilaterally and intact distal strength.  Back-1+ tender to palpation from L3-S3 in the midline. No overlying redness or swelling      Please note that this dictation was created using voice recognition software. I have made every reasonable attempt to correct obvious errors, but I expect that there are errors of grammar and possibly content that I did not discover before finalizing the note.    Assessment/Plan:  1. Arthritis  hydrocodone-acetaminophen (NORCO) 5-325 MG Tab per tablet    DISCONTINUED: hydrocodone-acetaminophen (NORCO) 5-325 MG Tab per tablet    DISCONTINUED: hydrocodone-acetaminophen (NORCO) 5-325 MG Tab per tablet   2. Tobacco abuse     3. Panlobular emphysema (CMS-HCC)     4. Chronic midline low back pain without sciatica        Plan: 1. " Patient is again counseled to reduce his daily cigarette use  2. Renew Norco 5 mg, #90 per month, 3 months written  3. UDS will be due in 3 months  4. Renew meloxicam at this time

## 2017-06-16 NOTE — ASSESSMENT & PLAN NOTE
Patient continues to experience low back pain with strong radiation of both hips with any significant amount of walking. Reports significant hip pain walking the 6 blocks or so from his apartment to our office this morning. He did complete a orthopedic evaluation for his hip pain. Plain x-rays of the hip were unremarkable. They felt that he was having mainly referred pain from his moderate degenerative lumbar arthritis driving the hip pain. Patient reports that his walking is very limited. Not reporting any urinary or fecal incontinence. He is continuing to take Norco 5 mg 3 times daily. Urine drug screen was appropriate in January 2017.

## 2017-06-16 NOTE — ASSESSMENT & PLAN NOTE
Patient notes brief early morning cough with clearing up of small amount of clear sputum. Not reporting any hemoptysis or wheezing.

## 2017-06-16 NOTE — ASSESSMENT & PLAN NOTE
Patient reports smoking has increased since her last visit now up to about half a pack of cigarettes per day. Reports that he smokes mainly out of boredom because he cannot walk. He does not have very many healthy teeth left and so chewing gum is not an option. Reports home oximetry tends to run 97-98% in room air. He does have oxygen at home for nocturnal use. Denies current chest pain or chest pressure.

## 2017-06-16 NOTE — MR AVS SNAPSHOT
"        Fredy Gonsalez    2017 7:50 AM   Office Visit   MRN: [id number]    [facility]   Dept Phone:  [phone]    Description:  Male : 1945   Provider:  Robert Lindo M.D.           Reason for Visit     Follow-Up           Allergies as of 2017     Allergen Noted Reactions    Diclofenac Sodium 2016   Unspecified    Made pt dizzy    Fish 2011         You were diagnosed with     Arthritis   [542541]       Tobacco abuse   [484525]       Panlobular emphysema (CMS-HCC)   [265085]       Chronic midline low back pain without sciatica   [0970909]         Vital Signs     Blood Pressure Pulse Temperature Respirations Height Weight    118/56 mmHg 100 36.7 °C (98 °F) 16 1.702 m (5' 7\") 75.297 kg (166 lb)    Body Mass Index Oxygen Saturation Smoking Status             25.99 kg/m2 93% Current Every Day Smoker         Basic Information     Date Of Birth Sex Race Ethnicity Preferred Language    1945 Male White Non- English      Your appointments     2017  9:20 AM   Established Patient Pul with CHIVO Blanchard   [facility] (--)    [address] [facility] [address]  [facility] [address] [phone]              Problem List              ICD-10-CM Priority Class Noted - Resolved    Arthritis M19.90   2011 - Present    Tobacco abuse Z72.0   2011 - Present    Hyperlipidemia E78.5   2012 - Present    COPD (chronic obstructive pulmonary disease) (CMS-[facility]) J44.9   2012 - Present    AMIRA (obstructive sleep apnea) G47.33   2012 - Present    Dental caries K02.9   3/17/2015 - Present    Back pain M54.9   3/17/2015 - Present    Opioid type dependence, continuous (CMS-[facility]) F11.20   2015 - Present    IFG (impaired fasting glucose) R73.01   3/18/2016 - Present    Chronic lumbar pain M54.5, G89.29   2016 - Present    Lung nodule < 6cm on CT R91.1   2016 - Present    Bilateral hip pain " M25.551, M25.552   2/16/2017 - Present      Health Maintenance        Date Due Completion Dates    IMM ZOSTER VACCINE 11/13/2007 ---    IMM PNEUMOCOCCAL 65+ (ADULT) LOW/MEDIUM RISK SERIES (2 of 2 - PPSV23) 9/28/2016 9/28/2015    COLON CANCER SCREENING ANNUAL FIT 11/4/2017 11/4/2016 (Declined), 3/9/2015, 11/13/2013, 10/10/2012    Override on 11/4/2016: Patient Declined (pt decison)    IMM DTaP/Tdap/Td Vaccine (2 - Td) 12/30/2025 12/30/2015            Current Immunizations     13-VALENT PCV PREVNAR 9/28/2015    Influenza Vaccine Quad Inj (Preserved) 10/20/2016, 11/2/2015    Tdap Vaccine 12/30/2015      Below and/or attached are the medications your provider expects you to take. Review all of your home medications and newly ordered medications with your provider and/or pharmacist. Follow medication instructions as directed by your provider and/or pharmacist. Please keep your medication list with you and share with your provider. Update the information when medications are discontinued, doses are changed, or new medications (including over-the-counter products) are added; and carry medication information at all times in the event of emergency situations     Allergies:  DICLOFENAC SODIUM - Unspecified     FISH - (reactions not documented)               Medications  Valid as of: June 16, 2017 -  8:19 AM    Generic Name Brand Name Tablet Size Instructions for use    Albuterol Sulfate (Aero Soln) albuterol 108 (90 BASE) MCG/ACT Inhale 2 Puffs by mouth every four hours as needed for Shortness of Breath. Inhale 2 puffs every 4 hours as needed for shortness of breath.        Albuterol Sulfate (Aero Soln) albuterol 108 (90 BASE) MCG/ACT Inhale 2 Puffs by mouth every 6 hours as needed.        Albuterol Sulfate (Aero Soln) albuterol 108 (90 BASE) MCG/ACT Inhale 2 Puffs by mouth every 6 hours as needed for Shortness of Breath.        Budesonide-Formoterol Fumarate (Aerosol) SYMBICORT 80-4.5 MCG/ACT Inhale 2 Puffs by mouth 2 Times  a Day.        Budesonide-Formoterol Fumarate (Aerosol) SYMBICORT 160-4.5 MCG/ACT INHALE 2 PUFFS BY MOUTH TWICE DAILY        Fluticasone-Salmeterol (AEROSOL POWDER, BREATH ACTIVATED) ADVAIR DISKUS 250-50 MCG/DOSE INL 1 PUFF PO Q 12 H        Hydrocodone-Acetaminophen (Tab) NORCO 5-325 MG Take 1 Tab by mouth every 8 hours as needed. Prescription must last 30 days.        Influenza Vac Split Quad (Suspension) FLUZONE QUADRIVALENT          Meloxicam (Tab) MOBIC 7.5 MG TAKE 1 TABLET BY MOUTH DAILY        Simvastatin (Tab) ZOCOR 40 MG TAKE 1 TABLET BY MOUTH EVERY EVENING        Tiotropium Bromide Monohydrate (Cap) SPIRIVA 18 MCG INHALE CONTENTS OF ONE CAPSULE BY MOUTH USING HANDIHALER        .                 Medicines prescribed today were sent to:     [facility] [address] [place] [address] [address] [address] [address] [facility] [address]    Phone: [phone] Fax: [phone]    Open 24 Hours?: Yes      Medication refill instructions:       If your prescription bottle indicates you have medication refills left, it is not necessary to call your provider’s office. Please contact your pharmacy and they will refill your medication.    If your prescription bottle indicates you do not have any refills left, you may request refills at any time through one of the following ways: The online [employer] system (except Urgent Care), by calling your provider’s office, or by asking your pharmacy to contact your provider’s office with a refill request. Medication refills are processed only during regular business hours and may not be available until the next business day. Your provider may request additional information or to have a follow-up visit with you prior to refilling your medication.   *Please Note: Medication refills are assigned a new Rx number when refilled electronically. Your pharmacy may indicate that no refills were authorized even though a new prescription for the same medication is  available at the pharmacy. Please request the medicine by name with the pharmacy before contacting your provider for a refill.        Other Notes About Your Plan     Pain contract signed 12/15/15  FALL RISK DONE 6/25/15 RB           Rafita Status: Patient Declined        Quit Tobacco Information     Do you want to quit using tobacco?    Quitting tobacco decreases risks of cancer, heart and lung disease, increases life expectancy, improves sense of taste and smell, and increases spending money, among other benefits.    If you are thinking about quitting, we can help.  • [facility] Quit Tobacco Program: [phone]  o Program occurs weekly for four weeks and includes pharmacist consultation on products to support quitting smoking or chewing tobacco. A provider referral is needed for pharmacist consultation.  • Tobacco Users Help Hotline: 0-526-QUITNOW (518-0476) or https://[place].quitlogix.org/  o Free, confidential telephone and online coaching for [place] residents. Sessions are designed on a schedule that is convenient for you. Eligible clients receive free nicotine replacement therapy.  • Nationally: www.smokefree.gov  o Information and professional assistance to support both immediate and long-term needs as you become, and remain, a non-smoker. Smokefree.gov allows you to choose the help that best fits your needs.

## 2017-07-06 ENCOUNTER — OFFICE VISIT (OUTPATIENT)
Dept: PULMONOLOGY | Facility: HOSPICE | Age: 72
End: 2017-07-06
Payer: MEDICARE

## 2017-07-06 VITALS
DIASTOLIC BLOOD PRESSURE: 65 MMHG | TEMPERATURE: 98.1 F | HEART RATE: 80 BPM | BODY MASS INDEX: 25.58 KG/M2 | OXYGEN SATURATION: 98 % | HEIGHT: 67 IN | RESPIRATION RATE: 18 BRPM | WEIGHT: 163 LBS | SYSTOLIC BLOOD PRESSURE: 135 MMHG

## 2017-07-06 DIAGNOSIS — F11.20 OPIOID TYPE DEPENDENCE, CONTINUOUS (HCC): ICD-10-CM

## 2017-07-06 DIAGNOSIS — J43.1 PANLOBULAR EMPHYSEMA (HCC): ICD-10-CM

## 2017-07-06 DIAGNOSIS — E78.2 MIXED HYPERLIPIDEMIA: ICD-10-CM

## 2017-07-06 DIAGNOSIS — Z72.0 TOBACCO ABUSE: ICD-10-CM

## 2017-07-06 DIAGNOSIS — G47.33 OSA (OBSTRUCTIVE SLEEP APNEA): ICD-10-CM

## 2017-07-06 PROCEDURE — 99214 OFFICE O/P EST MOD 30 MIN: CPT | Performed by: NURSE PRACTITIONER

## 2017-07-06 NOTE — PATIENT INSTRUCTIONS
1. Continue Symbicort 160/4.5, 2 puffs, twice a day  2. Spiriva Respimat 2 actuations daily  3. Albuterol HFA or neb every 4 hours as needed  4. Your up-to-date on vaccinations  5. Influenza vaccine recommended in the fall  6. Smoking cessation is always strongly encouraged  7. Use 2 L of oxygen nocturnally as tolerated  8. Follow-up in 6 months, sooner if necessary

## 2017-07-06 NOTE — MR AVS SNAPSHOT
"        Fredy Bell Wallace Cruz   2017 9:20 AM   Office Visit   MRN: [id number]    Department:  [facility]   Dept Phone:  [phone]    Description:  Male : 1945   Provider:  CHIVO Blanchard           Reason for Visit     Follow-Up 6 M      Allergies as of 2017     Allergen Noted Reactions    Diclofenac Sodium 2016   Unspecified    Made pt dizzy    Fish 2011         You were diagnosed with     Tobacco abuse   [103031]       Mixed hyperlipidemia   [272.2.ICD-9-CM]       Panlobular emphysema (CMS-HCC)   [103237]       AMIRA (obstructive sleep apnea)   [733759]       Opioid type dependence, continuous (CMS-HCC)   [304.01.ICD-9-CM]       Lung nodule < 6cm on CT   [3420200]         Vital Signs     Blood Pressure Pulse Temperature Respirations Height Weight    135/65 mmHg 80 36.7 °C (98.1 °F) 18 1.702 m (5' 7.01\") 73.936 kg (163 lb)    Body Mass Index Oxygen Saturation Smoking Status             25.52 kg/m2 98% Current Every Day Smoker         Basic Information     Date Of Birth Sex Race Ethnicity Preferred Language    1945 Male White Non- English      Problem List              ICD-10-CM Priority Class Noted - Resolved    Arthritis M19.90   2011 - Present    Tobacco abuse Z72.0   2011 - Present    Hyperlipidemia E78.5   2012 - Present    COPD (chronic obstructive pulmonary disease) (CMS-HCC) J44.9   2012 - Present    AMIRA (obstructive sleep apnea) G47.33   2012 - Present    Dental caries K02.9   3/17/2015 - Present    Back pain M54.9   3/17/2015 - Present    Opioid type dependence, continuous (CMS-HCC) F11.20   2015 - Present    IFG (impaired fasting glucose) R73.01   3/18/2016 - Present    Chronic lumbar pain M54.5, G89.29   2016 - Present    Lung nodule < 6cm on CT R91.1   2016 - Present    Bilateral hip pain M25.551, M25.552   2017 - Present      Health Maintenance        Date Due Completion Dates    IMM " ZOSTER VACCINE 11/13/2007 ---    IMM PNEUMOCOCCAL 65+ (ADULT) LOW/MEDIUM RISK SERIES (2 of 2 - PPSV23) 9/28/2016 9/28/2015    IMM INFLUENZA (1) 9/1/2017 10/20/2016, 11/2/2015    COLON CANCER SCREENING ANNUAL FIT 11/4/2017 11/4/2016 (Declined), 3/9/2015, 11/13/2013, 10/10/2012    Override on 11/4/2016: Patient Declined (pt decison)    IMM DTaP/Tdap/Td Vaccine (2 - Td) 12/30/2025 12/30/2015            Current Immunizations     13-VALENT PCV PREVNAR 9/28/2015    Influenza Vaccine Quad Inj (Preserved) 10/20/2016, 11/2/2015    Tdap Vaccine 12/30/2015      Below and/or attached are the medications your provider expects you to take. Review all of your home medications and newly ordered medications with your provider and/or pharmacist. Follow medication instructions as directed by your provider and/or pharmacist. Please keep your medication list with you and share with your provider. Update the information when medications are discontinued, doses are changed, or new medications (including over-the-counter products) are added; and carry medication information at all times in the event of emergency situations     Allergies:  DICLOFENAC SODIUM - Unspecified     FISH - (reactions not documented)               Medications  Valid as of: July 06, 2017 -  9:20 AM    Generic Name Brand Name Tablet Size Instructions for use    Albuterol Sulfate (Aero Soln) albuterol 108 (90 BASE) MCG/ACT Inhale 2 Puffs by mouth every four hours as needed for Shortness of Breath. Inhale 2 puffs every 4 hours as needed for shortness of breath.        Albuterol Sulfate (Aero Soln) albuterol 108 (90 BASE) MCG/ACT Inhale 2 Puffs by mouth every 6 hours as needed.        Albuterol Sulfate (Aero Soln) albuterol 108 (90 BASE) MCG/ACT Inhale 2 Puffs by mouth every 6 hours as needed for Shortness of Breath.        Budesonide-Formoterol Fumarate (Aerosol) SYMBICORT 80-4.5 MCG/ACT Inhale 2 Puffs by mouth 2 Times a Day.        Budesonide-Formoterol Fumarate  (Aerosol) SYMBICORT 160-4.5 MCG/ACT INHALE 2 PUFFS BY MOUTH TWICE DAILY        Fluticasone-Salmeterol (AEROSOL POWDER, BREATH ACTIVATED) ADVAIR DISKUS 250-50 MCG/DOSE INL 1 PUFF PO Q 12 H        Hydrocodone-Acetaminophen (Tab) NORCO 5-325 MG Take 1 Tab by mouth every 8 hours as needed. Prescription must last 30 days.        Meloxicam (Tab) MOBIC 7.5 MG TAKE 1 TABLET BY MOUTH DAILY        Simvastatin (Tab) ZOCOR 40 MG TAKE 1 TABLET BY MOUTH EVERY EVENING        Tiotropium Bromide Monohydrate (Cap) SPIRIVA 18 MCG INHALE CONTENTS OF ONE CAPSULE BY MOUTH USING HANDIHALER        .                 Medicines prescribed today were sent to:     [facility] [address] [place] [address] [address] [address] [address] [facility] [address]    Phone: [phone] Fax: [phone]    Open 24 Hours?: Yes      Medication refill instructions:       If your prescription bottle indicates you have medication refills left, it is not necessary to call your provider’s office. Please contact your pharmacy and they will refill your medication.    If your prescription bottle indicates you do not have any refills left, you may request refills at any time through one of the following ways: The online [employer] system (except Urgent Care), by calling your provider’s office, or by asking your pharmacy to contact your provider’s office with a refill request. Medication refills are processed only during regular business hours and may not be available until the next business day. Your provider may request additional information or to have a follow-up visit with you prior to refilling your medication.   *Please Note: Medication refills are assigned a new Rx number when refilled electronically. Your pharmacy may indicate that no refills were authorized even though a new prescription for the same medication is available at the pharmacy. Please request the medicine by name with the pharmacy before contacting your provider  for a refill.        Other Notes About Your Plan     Pain contract signed 12/15/15  FALL RISK DONE 6/25/15 RB           Rafita Status: Patient Declined        Quit Tobacco Information     Do you want to quit using tobacco?    Quitting tobacco decreases risks of cancer, heart and lung disease, increases life expectancy, improves sense of taste and smell, and increases spending money, among other benefits.    If you are thinking about quitting, we can help.  • [facility] Quit Tobacco Program: 787.188.3972  o Program occurs weekly for four weeks and includes pharmacist consultation on products to support quitting smoking or chewing tobacco. A provider referral is needed for pharmacist consultation.  • Tobacco Users Help Hotline: 9-528-QUIT-NOW (282-7946) or https://[place].quitlogix.org/  o Free, confidential telephone and online coaching for [place] residents. Sessions are designed on a schedule that is convenient for you. Eligible clients receive free nicotine replacement therapy.  • Nationally: www.smokefree.gov  o Information and professional assistance to support both immediate and long-term needs as you become, and remain, a non-smoker. Smokefree.gov allows you to choose the help that best fits your needs.

## 2017-07-06 NOTE — PROGRESS NOTES
Chief Complaint   Patient presents with   • Follow-Up     6 M         HPI: This patient is a 71 y.o. male, who presents for six-month follow-up COPD/emphysema, AMIRA, history pulmonary nodule stable ×2. Current smoker, 30 pk year hx. Medical history includes chronic back pain/arthritis, HLD.    In regards to COPD, PFTs indicate an FEV1 of 2.64 L 86% predicted, FEV1 FVC ratio of 63, DLCO 90% predicted. CT chest December 28, 2016 showed resolution of nodular groundglass opacity in LLL, consistent with inflammatory process. Other stable pulmonary nodules ×2 years indicating benign process. Given continued smoking, patient was referred to lung cancer screening at his visit in January. He is not eligible for screening CT until after December 28, 2017. Patient has tried multiple interventions for smoking cessation including Chantix, hypnosis, nicotine patches and gum. He is not interested in quitting at this time, this was of course encouraged again today. Patient is compliant with Symbicort 160/4.5, 2 puffs, twice a day, Spiriva Respimat 2 actuations daily, rare use Albuterol HFA . Breathing remains stable. Patient notes exertional dyspnea, cough typically in the a.m.'s only. Denies hemoptysis, fever, chills, night sweats. Denies URI since his last visit.    He has a history of AMIRA intolerant to CPAP. Uses 2 L of oxygen nocturnally when tolerated. He adamantly declines CPAP. He does not feel he benefits from nocturnal O2.    Past Medical History   Diagnosis Date   • Arthritis    • EMPHYSEMA    • Hyperlipidemia 4/17/2012   • COPD        Social History   Substance Use Topics   • Smoking status: Current Every Day Smoker -- 1.00 packs/day for 60 years     Types: Cigarettes     Start date: 06/15/1957   • Smokeless tobacco: Former User      Comment: down to 1 pack per week   • Alcohol Use: No      Comment: quit 28yrs ago       Family History   Problem Relation Age of Onset   • Arthritis Mother    • Lung Disease Mother    •  "Osteoporosis Mother    • Alzheimer's Disease Mother    • Other Mother      parkinsons   • Kidney Disease Father    • Cancer Neg Hx    • Diabetes Neg Hx    • Heart Disease Neg Hx    • Stroke Neg Hx        Current medications as of today   Current Outpatient Prescriptions   Medication Sig Dispense Refill   • meloxicam (MOBIC) 7.5 MG Tab TAKE 1 TABLET BY MOUTH DAILY 30 Tab 11   • hydrocodone-acetaminophen (NORCO) 5-325 MG Tab per tablet Take 1 Tab by mouth every 8 hours as needed. Prescription must last 30 days. 90 Tab 0   • simvastatin (ZOCOR) 40 MG Tab TAKE 1 TABLET BY MOUTH EVERY EVENING 30 Tab 11   • SYMBICORT 160-4.5 MCG/ACT Aerosol INHALE 2 PUFFS BY MOUTH TWICE DAILY 10.2 g 3   • budesonide-formoterol (SYMBICORT) 80-4.5 MCG/ACT Aerosol Inhale 2 Puffs by mouth 2 Times a Day. 1 Inhaler 11   • albuterol 108 (90 BASE) MCG/ACT Aero Soln inhalation aerosol Inhale 2 Puffs by mouth every 6 hours as needed for Shortness of Breath. 8.5 g 11   • ADVAIR DISKUS 250-50 MCG/DOSE AEROSOL POWDER, BREATH ACTIVATED INL 1 PUFF PO Q 12 H  6   • albuterol (PROAIR HFA) 108 (90 BASE) MCG/ACT Aero Soln inhalation aerosol Inhale 2 Puffs by mouth every 6 hours as needed. 8.5 g 3   • tiotropium (SPIRIVA HANDIHALER) 18 MCG Cap INHALE CONTENTS OF ONE CAPSULE BY MOUTH USING HANDIHALER 30 Cap 6   • albuterol 108 (90 BASE) MCG/ACT Aero Soln inhalation aerosol Inhale 2 Puffs by mouth every four hours as needed for Shortness of Breath. Inhale 2 puffs every 4 hours as needed for shortness of breath.       No current facility-administered medications for this visit.       Allergies: Diclofenac sodium and Fish    Blood pressure 135/65, pulse 80, temperature 36.7 °C (98.1 °F), resp. rate 18, height 1.702 m (5' 7.01\"), weight 73.936 kg (163 lb), SpO2 98 %.      ROS:   Constitutional: Denies fevers, chills, night sweats, weight loss or fatigue  HEENT: Denies earache, difficulty hearing, tinnitus, nasal congestion, hoarseness  Cardiovascular: Denies " chest pain, tightness, palpitations, orthopnea or edema  Respiratory: See HPI  Sleep: Denies daytime sleepiness, snoring, apneas, insomnia, morning headaches  GI: Denies heartburn, dysphagia, nausea, abdominal pain, diarrhea or constipation  : Denies frequent urination, hematuria, discharge or painful urination  Musculoskeletal: Positive chronic back and joint  Neurological: Denies weakness or headaches  Skin: No rashes    Physical exam:   Appearance: Well-nourished, well-developed, in no acute distress  HEENT: Normocephalic, atraumatic, white sclera, PERRLA, oropharynx clear, poor dentition  Respiratory: no intercostal retractions or accessory muscle use   Lungs auscultation: Diminished breath sounds throughout, otherwise clear  Cardiovascular: Regular rate rhythm no murmurs, rubs or gallops  Gait: Normal  Digits: No clubbing, cyanosis  Motor: No focal deficits  Orientation: Oriented to time, person and place  Psych: No depression or anxiety    Diagnosis:  1. Tobacco abuse     2. Mixed hyperlipidemia     3. Panlobular emphysema (CMS-HCC)     4. AMIRA (obstructive sleep apnea)     5. Opioid type dependence, continuous (CMS-HCC)     6. Lung nodule < 6cm on CT         Plan:    Clinically patient's COPD is stable. Denies URI since his last visit. Is up-to-date on vaccinations.     1. Continue Symbicort 160/4.5, 2 puffs, twice a day  2. Spiriva Respimat 2 actuations daily  3. Albuterol HFA or neb every 4 hours as needed  4. Patient is up-to-date on vaccinations  5. Influenza vaccine recommended in the fall  6. Smoking cessation is always strongly encouraged  7. Use 2 L of oxygen nocturnally as tolerated  8. Follow-up in 6 months, sooner if necessary. Screening chest CT can be ordered at that time.

## 2017-09-08 ENCOUNTER — OFFICE VISIT (OUTPATIENT)
Dept: MEDICAL GROUP | Facility: MEDICAL CENTER | Age: 72
End: 2017-09-08
Attending: FAMILY MEDICINE
Payer: MEDICARE

## 2017-09-08 VITALS
TEMPERATURE: 98.4 F | BODY MASS INDEX: 26.37 KG/M2 | HEART RATE: 92 BPM | HEIGHT: 67 IN | WEIGHT: 168 LBS | RESPIRATION RATE: 16 BRPM | SYSTOLIC BLOOD PRESSURE: 120 MMHG | OXYGEN SATURATION: 95 % | DIASTOLIC BLOOD PRESSURE: 56 MMHG

## 2017-09-08 DIAGNOSIS — M19.90 ARTHRITIS: ICD-10-CM

## 2017-09-08 PROCEDURE — 99213 OFFICE O/P EST LOW 20 MIN: CPT | Performed by: FAMILY MEDICINE

## 2017-09-08 RX ORDER — HYDROCODONE BITARTRATE AND ACETAMINOPHEN 5; 325 MG/1; MG/1
1 TABLET ORAL EVERY 8 HOURS PRN
Qty: 90 TAB | Refills: 0 | Status: SHIPPED | OUTPATIENT
Start: 2017-09-08 | End: 2018-04-30 | Stop reason: SDUPTHER

## 2017-09-08 RX ORDER — HYDROCODONE BITARTRATE AND ACETAMINOPHEN 5; 325 MG/1; MG/1
1 TABLET ORAL EVERY 8 HOURS PRN
Qty: 90 TAB | Refills: 0 | Status: SHIPPED | OUTPATIENT
Start: 2017-09-08 | End: 2017-09-08 | Stop reason: SDUPTHER

## 2017-09-08 ASSESSMENT — PAIN SCALES - GENERAL: PAINLEVEL: 9=SEVERE PAIN

## 2017-09-08 NOTE — PROGRESS NOTES
"Subjective:      Fredy Gonsalez Jr. is a 71 y.o. male who presents with Follow-Up            HPI 1. Chronic low back pain-patient initially sprints daily low back pain particularly when he awakens in the morning. As he is up and walking he reports pain in the lateral aspect of both hips right worse than left. Not reporting any urinary or fecal incontinence. Patient seems to manage that pain taking 5 mg of Norco 3 times daily along with meloxicam 7.5 mg.    ROS not reporting black stools, bloody stools, ankle edema, nausea       Objective:     /56   Pulse 92   Temp 36.9 °C (98.4 °F)   Resp 16   Ht 1.702 m (5' 7.01\")   Wt 76.2 kg (168 lb)   SpO2 95%   BMI 26.31 kg/m²      Physical Exam  Gen.-alert cooperative male in no acute distress  Chest- clear breath sounds without wheezes, rales, ronchi. No retractions. Chest wall nontender.  Back-1+ tender midline L5-S4 and mildly in the right SI area.  Lower extremities-intact light touch, intact strength and no pretibial edema          Assessment/Plan:     1. Arthritis  2. Chronic lumbar pain    - [facility]; Future  - hydrocodone-acetaminophen (NORCO) 5-325 MG Tab per tablet; Take 1 Tab by mouth every 8 hours as needed. Prescription must last 30 days.  Dispense: 90 Tab; Refill: 0    Plan: 1. CMP was within normal range in January 2017  2. Renew Norco 5/325, #90, 2 additional scripts written today  3. Revisit 3 months  4. UDS today  "

## 2018-04-30 ENCOUNTER — OFFICE VISIT (OUTPATIENT)
Dept: MEDICAL GROUP | Facility: MEDICAL CENTER | Age: 73
End: 2018-04-30
Attending: FAMILY MEDICINE
Payer: COMMERCIAL

## 2018-04-30 VITALS
BODY MASS INDEX: 23.39 KG/M2 | HEIGHT: 67 IN | DIASTOLIC BLOOD PRESSURE: 62 MMHG | TEMPERATURE: 97.7 F | OXYGEN SATURATION: 95 % | HEART RATE: 100 BPM | SYSTOLIC BLOOD PRESSURE: 104 MMHG | RESPIRATION RATE: 16 BRPM | WEIGHT: 149 LBS

## 2018-04-30 DIAGNOSIS — Z12.11 SCREEN FOR COLON CANCER: ICD-10-CM

## 2018-04-30 DIAGNOSIS — G89.29 CHRONIC LOW BACK PAIN WITHOUT SCIATICA, UNSPECIFIED BACK PAIN LATERALITY: ICD-10-CM

## 2018-04-30 DIAGNOSIS — M54.50 CHRONIC LOW BACK PAIN WITHOUT SCIATICA, UNSPECIFIED BACK PAIN LATERALITY: ICD-10-CM

## 2018-04-30 DIAGNOSIS — M19.90 ARTHRITIS: ICD-10-CM

## 2018-04-30 DIAGNOSIS — I25.10 CORONARY ARTERY DISEASE INVOLVING NATIVE CORONARY ARTERY OF NATIVE HEART WITHOUT ANGINA PECTORIS: ICD-10-CM

## 2018-04-30 DIAGNOSIS — J43.1 PANLOBULAR EMPHYSEMA (HCC): ICD-10-CM

## 2018-04-30 DIAGNOSIS — M48.061 FORAMINAL STENOSIS OF LUMBAR REGION: ICD-10-CM

## 2018-04-30 PROCEDURE — 99214 OFFICE O/P EST MOD 30 MIN: CPT | Performed by: FAMILY MEDICINE

## 2018-04-30 RX ORDER — HYDROCODONE BITARTRATE AND ACETAMINOPHEN 5; 325 MG/1; MG/1
1 TABLET ORAL 2 TIMES DAILY PRN
Qty: 60 TAB | Refills: 0 | Status: SHIPPED | OUTPATIENT
Start: 2018-04-30 | End: 2018-05-29 | Stop reason: SDUPTHER

## 2018-04-30 RX ORDER — ATORVASTATIN CALCIUM 40 MG/1
40 TABLET, FILM COATED ORAL NIGHTLY
COMMUNITY
End: 2018-04-30 | Stop reason: SDUPTHER

## 2018-04-30 RX ORDER — LISINOPRIL 5 MG/1
5 TABLET ORAL DAILY
Qty: 30 TAB | Refills: 11 | Status: SHIPPED | OUTPATIENT
Start: 2018-04-30 | End: 2019-04-12 | Stop reason: SDUPTHER

## 2018-04-30 RX ORDER — LISINOPRIL 5 MG/1
5 TABLET ORAL DAILY
COMMUNITY
End: 2018-04-30 | Stop reason: SDUPTHER

## 2018-04-30 RX ORDER — METOPROLOL SUCCINATE 50 MG/1
50 TABLET, EXTENDED RELEASE ORAL DAILY
COMMUNITY
End: 2018-04-30 | Stop reason: SDUPTHER

## 2018-04-30 RX ORDER — ATORVASTATIN CALCIUM 40 MG/1
40 TABLET, FILM COATED ORAL DAILY
Qty: 30 TAB | Refills: 11 | Status: SHIPPED | OUTPATIENT
Start: 2018-04-30 | End: 2019-04-12 | Stop reason: SDUPTHER

## 2018-04-30 RX ORDER — CLOPIDOGREL BISULFATE 75 MG/1
75 TABLET ORAL DAILY
Qty: 30 TAB | Refills: 11 | Status: SHIPPED | OUTPATIENT
Start: 2018-04-30 | End: 2019-04-12 | Stop reason: SDUPTHER

## 2018-04-30 RX ORDER — CLOPIDOGREL BISULFATE 75 MG/1
75 TABLET ORAL DAILY
COMMUNITY
End: 2018-04-30 | Stop reason: SDUPTHER

## 2018-04-30 RX ORDER — METOPROLOL SUCCINATE 50 MG/1
50 TABLET, EXTENDED RELEASE ORAL DAILY
Qty: 30 TAB | Refills: 11 | Status: SHIPPED | OUTPATIENT
Start: 2018-04-30 | End: 2019-04-12 | Stop reason: SDUPTHER

## 2018-04-30 ASSESSMENT — PATIENT HEALTH QUESTIONNAIRE - PHQ9: CLINICAL INTERPRETATION OF PHQ2 SCORE: 0

## 2018-04-30 ASSESSMENT — PAIN SCALES - GENERAL: PAINLEVEL: NO PAIN

## 2018-04-30 NOTE — ASSESSMENT & PLAN NOTE
Patient reports currently cigarettes of introduced to 3 cigarettes per day. He does bring up some clear-colored sputum each morning. Is not having hemoptysis. Denies significant dyspnea. Is actually only using his albuterol inhaler and has suspended his steroid inhaler along with his Spiriva and reports that his breathing is doing fine without them.

## 2018-04-30 NOTE — ASSESSMENT & PLAN NOTE
MRI of the lumbar spine in 2015 was notable for moderate bilateral foraminal stenosis at one level, L4-5. Also had some moderate spinal canal stenosis at L5-S1. Patient had been taking Norco 5 mg up to 3 times per day for hip and back pain. Reports currently hips are doing well but back pain is persistent. Has not had any narcotic over the past 4 months while he was up in the [facility]. Does walk or ride his bike, no car. Not reporting any constipation or confusion.

## 2018-04-30 NOTE — PROGRESS NOTES
No chief complaint on file.      HISTORY OF PRESENT ILLNESS: Patient is a 72 y.o. male established patient who presents today to follow-up on recent myocardial infarction with stent placement, COPD, chronic lumbar pain        Coronary artery disease involving native coronary artery of native heart without angina pectoris  Patient underwent stenting of 3 coronary arteries on 12/2/17 while visiting a friend in [place]. He sustained a myocardial infarction while in the emergency room the day before and stents replaced the next day. He reports currently is not having any chest pain or chest pressure. Has reduced cigarettes down to 3 cigarettes per day and is intending to reduce them further. Is compliant taking metoprolol, lisinopril, clopidogrel.    COPD (chronic obstructive pulmonary disease)  Patient reports currently cigarettes of introduced to 3 cigarettes per day. He does bring up some clear-colored sputum each morning. Is not having hemoptysis. Denies significant dyspnea. Is actually only using his albuterol inhaler and has suspended his steroid inhaler along with his Spiriva and reports that his breathing is doing fine without them.    Chronic lumbar pain  MRI of the lumbar spine in 2015 was notable for moderate bilateral foraminal stenosis at one level, L4-5. Also had some moderate spinal canal stenosis at L5-S1. Patient had been taking Norco 5 mg up to 3 times per day for hip and back pain. Reports currently hips are doing well but back pain is persistent. Has not had any narcotic over the past 4 months while he was up in the [place] area. Does walk or ride his bike, no car. Not reporting any constipation or confusion.  Social history-single, lives in a weekly motel. Not working    Patient Active Problem List    Diagnosis Date Noted   • Coronary artery disease involving native coronary artery of native heart without angina pectoris 04/30/2018   • Bilateral hip pain 02/16/2017   • Chronic lumbar pain 07/21/2016    • Lung nodule < 6cm on CT 07/21/2016   • IFG (impaired fasting glucose) 03/18/2016   • Opioid type dependence, continuous ([facility]) 06/25/2015   • Dental caries 03/17/2015   • Back pain 03/17/2015   • Hyperlipidemia 04/17/2012   • COPD (chronic obstructive pulmonary disease) ([facility]) 04/17/2012   • AMIRA (obstructive sleep apnea) 04/17/2012   • Arthritis 11/11/2011   • Tobacco abuse 11/11/2011       Allergies:Diclofenac sodium and Fish    Current Outpatient Prescriptions   Medication Sig Dispense Refill   • aspirin EC (ECOTRIN) 81 MG Tablet Delayed Response Take 81 mg by mouth every day.     • atorvastatin (LIPITOR) 40 MG Tab Take 1 Tab by mouth every day. 30 Tab 11   • clopidogrel (PLAVIX) 75 MG Tab Take 1 Tab by mouth every day. 30 Tab 11   • lisinopril (PRINIVIL) 5 MG Tab Take 1 Tab by mouth every day. 30 Tab 11   • metoprolol SR (TOPROL XL) 50 MG TABLET SR 24 HR Take 1 Tab by mouth every day. 30 Tab 11   • HYDROcodone-acetaminophen (NORCO) 5-325 MG Tab per tablet Take 1 Tab by mouth 2 times a day as needed for up to 30 days. Prescription must last 30 days. 60 Tab 0   • meloxicam (MOBIC) 7.5 MG Tab TAKE 1 TABLET BY MOUTH DAILY 30 Tab 11   • SYMBICORT 160-4.5 MCG/ACT Aerosol INHALE 2 PUFFS BY MOUTH TWICE DAILY 10.2 g 3   • budesonide-formoterol (SYMBICORT) 80-4.5 MCG/ACT Aerosol Inhale 2 Puffs by mouth 2 Times a Day. 1 Inhaler 11   • albuterol 108 (90 BASE) MCG/ACT Aero Soln inhalation aerosol Inhale 2 Puffs by mouth every 6 hours as needed for Shortness of Breath. 8.5 g 11   • ADVAIR DISKUS 250-50 MCG/DOSE AEROSOL POWDER, BREATH ACTIVATED INL 1 PUFF PO Q 12 H  6   • albuterol (PROAIR HFA) 108 (90 BASE) MCG/ACT Aero Soln inhalation aerosol Inhale 2 Puffs by mouth every 6 hours as needed. 8.5 g 3   • tiotropium (SPIRIVA HANDIHALER) 18 MCG Cap INHALE CONTENTS OF ONE CAPSULE BY MOUTH USING HANDIHALER 30 Cap 6   • albuterol 108 (90 BASE) MCG/ACT Aero Soln inhalation aerosol Inhale 2 Puffs by mouth every four hours as needed  "for Shortness of Breath. Inhale 2 puffs every 4 hours as needed for shortness of breath.       No current facility-administered medications for this visit.        Social History   Substance Use Topics   • Smoking status: Current Every Day Smoker     Packs/day: 0.25     Years: 60.00     Types: Cigarettes     Start date: 6/15/1957   • Smokeless tobacco: Former User      Comment: down to 1 pack per week   • Alcohol use No      Comment: quit 28yrs ago       Family History   Problem Relation Age of Onset   • Arthritis Mother    • Lung Disease Mother    • Osteoporosis Mother    • Alzheimer's Disease Mother    • Other Mother      parkinsons   • Kidney Disease Father    • Cancer Neg Hx    • Diabetes Neg Hx    • Heart Disease Neg Hx    • Stroke Neg Hx        ROS:  Review of Systems   Constitutional: Negative for fever, chills, weight loss and malaise/fatigue.   Eyes: Negative for blurred vision.   Respiratory: Negative for cough, sputum production, shortness of breath and wheezing.    Cardiovascular: Negative for chest pain, palpitations, orthopnea and leg swelling.   Gastrointestinal: Negative for heartburn, nausea, vomiting and abdominal pain.    Endo/Heme/Allergies: Does not bruise/bleed easily.               Exam:  Blood pressure 104/62, pulse 100, temperature 36.5 °C (97.7 °F), resp. rate 16, height 1.702 m (5' 7.01\"), weight 67.6 kg (149 lb), SpO2 95 %.  General:  Well nourished, well developed male in NAD  Head is grossly normal.  Neck: Supple without JVD or bruit. Thyroid is not enlarged. Trachea is midline.  Pulmonary: Clear to ausculation .  Normal effort. No rales, ronchi, or wheezing.  Cardiovascular: Regular rate and rhythm without murmur.  Abdomen-Abdomen is soft, normal bowel sounds, no masses, guarding, ororganomegaly, or tenderness.  Lower extremities- neg for pretibial edema, redness, tenderness.      Please note that this dictation was created using voice recognition software. I have made every reasonable " attempt to correct obvious errors, but I expect that there are errors of grammar and possibly content that I did not discover before finalizing the note.    Assessment/Plan:  1. Screen for colon cancer  REFERRAL TO GI FOR COLONOSCOPY   2. Panlobular emphysema (HCC)     3. Foraminal stenosis of lumbar region  HYDROcodone-acetaminophen (NORCO) 5-325 MG Tab per tablet    CONSENT FOR OPIATE PRESCRIPTION   4. Coronary artery disease involving native coronary artery of native heart without angina pectoris     5. Arthritis  HYDROcodone-acetaminophen (NORCO) 5-325 MG Tab per tablet   6. Chronic low back pain without sciatica, unspecified back pain laterality        Plan: 1. Will restart patient on a reduced dosing schedule of 5 mg Norco. He did not get any benefit from NSAIDs and with the current aspirin therapy could not return to those.  2. Renew current cardiac medications  3. UDS and updated consent today  4. Revisit with me one month

## 2018-04-30 NOTE — ASSESSMENT & PLAN NOTE
Patient underwent stenting of 3 coronary arteries on 12/2/17 while visiting a friend in [place]. He sustained a myocardial infarction while in the emergency room the day before and stents replaced the next day. He reports currently is not having any chest pain or chest pressure. Has reduced cigarettes down to 3 cigarettes per day and is intending to reduce them further. Is compliant taking metoprolol, lisinopril, clopidogrel.

## 2018-05-01 ENCOUNTER — HOSPITAL ENCOUNTER (OUTPATIENT)
Facility: MEDICAL CENTER | Age: 73
End: 2018-05-01
Attending: FAMILY MEDICINE
Payer: MEDICARE

## 2018-05-01 PROCEDURE — 82274 ASSAY TEST FOR BLOOD FECAL: CPT

## 2018-05-11 ENCOUNTER — TELEPHONE (OUTPATIENT)
Dept: MEDICAL GROUP | Facility: MEDICAL CENTER | Age: 73
End: 2018-05-11

## 2018-05-11 DIAGNOSIS — Z12.11 SCREENING FOR MALIGNANT NEOPLASM OF COLON: ICD-10-CM

## 2018-05-11 NOTE — TELEPHONE ENCOUNTER
1. Caller Name: [facility]                                         Call Back Number: [phone]      Patient approves a detailed voicemail message: yes    2. SPECIFIC Action To Be Taken: Please order a FIT test as the pt has brought it in to the lab    3. Diagnosis/Clinical Reason for Request: Colon Cancer Screening    4. Specialty & Provider Name/Lab/Imaging Location: [facility]    5. Is appointment scheduled for requested order/referral: no    Patient informed they will receive a return phone call from the office ONLY if there are any questions before processing their request. Advised to call back if they haven't received a call from the referral department in 5 days.

## 2018-05-15 DIAGNOSIS — Z12.11 SCREENING FOR MALIGNANT NEOPLASM OF COLON: ICD-10-CM

## 2018-05-16 LAB — HEMOCCULT STL QL IA: NEGATIVE

## 2018-05-18 ENCOUNTER — TELEPHONE (OUTPATIENT)
Dept: MEDICAL GROUP | Facility: MEDICAL CENTER | Age: 73
End: 2018-05-18

## 2018-05-18 NOTE — TELEPHONE ENCOUNTER
----- Message from Robert Lindo M.D. sent at 5/17/2018  8:11 AM PDT -----  Stool check for blood was negative, repeat 1 yr

## 2018-05-29 ENCOUNTER — OFFICE VISIT (OUTPATIENT)
Dept: MEDICAL GROUP | Facility: MEDICAL CENTER | Age: 73
End: 2018-05-29
Attending: FAMILY MEDICINE
Payer: MEDICARE

## 2018-05-29 VITALS
RESPIRATION RATE: 16 BRPM | SYSTOLIC BLOOD PRESSURE: 102 MMHG | WEIGHT: 150 LBS | BODY MASS INDEX: 23.54 KG/M2 | HEIGHT: 67 IN | TEMPERATURE: 96.9 F | DIASTOLIC BLOOD PRESSURE: 52 MMHG | HEART RATE: 68 BPM | OXYGEN SATURATION: 97 %

## 2018-05-29 DIAGNOSIS — R07.89 ATYPICAL CHEST PAIN: ICD-10-CM

## 2018-05-29 DIAGNOSIS — M48.061 FORAMINAL STENOSIS OF LUMBAR REGION: ICD-10-CM

## 2018-05-29 DIAGNOSIS — M19.90 ARTHRITIS: ICD-10-CM

## 2018-05-29 PROCEDURE — 99213 OFFICE O/P EST LOW 20 MIN: CPT | Performed by: FAMILY MEDICINE

## 2018-05-29 RX ORDER — HYDROCODONE BITARTRATE AND ACETAMINOPHEN 5; 325 MG/1; MG/1
1 TABLET ORAL 2 TIMES DAILY PRN
Qty: 60 TAB | Refills: 0 | Status: SHIPPED | OUTPATIENT
Start: 2018-05-29 | End: 2018-06-28 | Stop reason: SDUPTHER

## 2018-05-29 ASSESSMENT — PAIN SCALES - GENERAL: PAINLEVEL: 6=MODERATE PAIN

## 2018-05-29 NOTE — PROGRESS NOTES
"Subjective:      Fredy Gonsalez Jr. is a 72 y.o. male who presents with No chief complaint on file.            HPI 1. Chronic lumbar pain/arthritis-patient reports that he has done reasonably well a past month with using only 2 doses of Norco 5/325 per day. He reports on occasion is cut some daily walks short due to pain. Not reporting any constipation or confusion.  2. Atypical chest pain-patient reports literally 1-2 second brief sharp pains on occasion perhaps 2-3 times per day in his left upper central chest area. Most of these occur at rest rarely will occur with activity. Patient is walking and normally does not have chest pain, chest pressure, diaphoresis. He is compliant taking his metoprolol, lisinopril, Plavix, baby aspirin. History is notable for three-way bypass completed in December 2017, while in [facility]    ROS negative for current abdominal pain, black or bloody stools, emesis. Smoking about 3-4 cigarettes per day.       Objective:     /52   Pulse 68   Temp 36.1 °C (96.9 °F)   Resp 16   Ht 1.702 m (5' 7.01\")   Wt 68 kg (150 lb)   SpO2 97%   BMI 23.49 kg/m²      Physical Exam  Gen.- alert, cooperative, in no acute distress  Neck- midline trachea, thyroid not enlarged or tender,supple, no cervical adenopathy  Chest-clear to auscultation and percussion with normal breath sounds. No retractions. Chest wall nontender  Cardiac- regular rhythm and rate. No murmur, thrill, or heave            Assessment/Plan:     1. Arthritis      2. Foraminal stenosis of lumbar region      3. Atypical chest pain    Plan: 1. Renew Norco  2. Follow-up in one month-observe regarding current atypical sounding chest pain    "

## 2018-06-28 ENCOUNTER — OFFICE VISIT (OUTPATIENT)
Dept: MEDICAL GROUP | Facility: MEDICAL CENTER | Age: 73
End: 2018-06-28
Attending: FAMILY MEDICINE
Payer: MEDICARE

## 2018-06-28 VITALS
RESPIRATION RATE: 16 BRPM | WEIGHT: 153 LBS | TEMPERATURE: 96.8 F | HEART RATE: 84 BPM | SYSTOLIC BLOOD PRESSURE: 108 MMHG | OXYGEN SATURATION: 95 % | DIASTOLIC BLOOD PRESSURE: 56 MMHG | HEIGHT: 67 IN | BODY MASS INDEX: 24.01 KG/M2

## 2018-06-28 DIAGNOSIS — R07.89 ATYPICAL CHEST PAIN: ICD-10-CM

## 2018-06-28 DIAGNOSIS — M19.90 ARTHRITIS: ICD-10-CM

## 2018-06-28 DIAGNOSIS — M48.061 FORAMINAL STENOSIS OF LUMBAR REGION: ICD-10-CM

## 2018-06-28 DIAGNOSIS — I25.10 CORONARY ARTERY DISEASE INVOLVING NATIVE CORONARY ARTERY OF NATIVE HEART WITHOUT ANGINA PECTORIS: ICD-10-CM

## 2018-06-28 DIAGNOSIS — F11.20 OPIOID TYPE DEPENDENCE, CONTINUOUS (HCC): ICD-10-CM

## 2018-06-28 PROCEDURE — 99213 OFFICE O/P EST LOW 20 MIN: CPT | Performed by: FAMILY MEDICINE

## 2018-06-28 RX ORDER — NITROGLYCERIN 0.4 MG/1
0.4 TABLET SUBLINGUAL PRN
Qty: 25 TAB | Refills: 1 | Status: SHIPPED | OUTPATIENT
Start: 2018-06-28 | End: 2018-08-31 | Stop reason: SDUPTHER

## 2018-06-28 RX ORDER — HYDROCODONE BITARTRATE AND ACETAMINOPHEN 5; 325 MG/1; MG/1
1 TABLET ORAL 2 TIMES DAILY PRN
Qty: 60 TAB | Refills: 0 | Status: SHIPPED | OUTPATIENT
Start: 2018-06-28 | End: 2018-07-30 | Stop reason: SDUPTHER

## 2018-06-28 ASSESSMENT — PAIN SCALES - GENERAL: PAINLEVEL: 2=MINIMAL-SLIGHT

## 2018-06-28 NOTE — PROGRESS NOTES
"Subjective:      Fredy Gonsalez Jr. is a 72 y.o. male who presents with Back Pain            HPI 1. Atypical chest pain-patient reports that the pins and needles sharp feeling in his left upper chest is actually persistent and may last as long as 5-10 minutes. It seems to occur randomly with no specific trigger and can occur when he's laying down at complete rest. There is no associated diaphoresis or increased shortness of breath. Patient is taking his aspirin and Plavix and just received a 3 vessel bypass done in [facility] in December 2017. He does smoke about 6-7 cigarettes per day on some days.  2. Opiate dependence/chronic lumbar pain-patient continues to take Norco 5/325 twice daily to help manage persistent low back pain. Patient does not have pain radiating into either leg but notes occasional numbness involving his left lower extremity. Patient is walking without assistance. No urinary incontinence    ROS negative for nausea, abdominal distention, ankle edema       Objective:     /56   Pulse 84   Temp 36 °C (96.8 °F)   Resp 16   Ht 1.702 m (5' 7.01\")   Wt 69.4 kg (153 lb)   SpO2 95%   BMI 23.96 kg/m²      Physical Exam  Gen.- alert, cooperative, in no acute distress  Neck- midline trachea, thyroid not enlarged or tender,supple, no cervical adenopathy  Chest-clear to auscultation and percussion with normal breath sounds. No retractions. Chest wall nontender  Cardiac- regular rhythm and rate. No murmur, thrill, or heave            Assessment/Plan:     1. Atypical chest pain      2. Coronary artery disease involving native coronary artery of native heart without angina pectoris      3. Arthritis    - HYDROcodone-acetaminophen (NORCO) 5-325 MG Tab per tablet; Take 1 Tab by mouth 2 times a day as needed for up to 30 days. Prescription must last 30 days.  Dispense: 60 Tab; Refill: 0    4. Foraminal stenosis of lumbar region    - HYDROcodone-acetaminophen (NORCO) 5-325 MG Tab per " tablet; Take 1 Tab by mouth 2 times a day as needed for up to 30 days. Prescription must last 30 days.  Dispense: 60 Tab; Refill: 0    5. Opioid type dependence, continuous (HCC)    Plan: 1. Cardiology referral  2. Nitroglycerin 0.4 mg-one may be used as needed. Recommendations for when the excess EMS were reviewed  3. Low-dose Norco renewed  4. Revisit one month sooner if needed

## 2018-07-30 ENCOUNTER — OFFICE VISIT (OUTPATIENT)
Dept: MEDICAL GROUP | Facility: MEDICAL CENTER | Age: 73
End: 2018-07-30
Attending: FAMILY MEDICINE
Payer: MEDICARE

## 2018-07-30 VITALS
SYSTOLIC BLOOD PRESSURE: 118 MMHG | WEIGHT: 151.4 LBS | DIASTOLIC BLOOD PRESSURE: 58 MMHG | HEIGHT: 67 IN | TEMPERATURE: 98.5 F | HEART RATE: 84 BPM | RESPIRATION RATE: 16 BRPM | BODY MASS INDEX: 23.76 KG/M2 | OXYGEN SATURATION: 95 %

## 2018-07-30 DIAGNOSIS — R07.89 ATYPICAL CHEST PAIN: ICD-10-CM

## 2018-07-30 DIAGNOSIS — M19.90 ARTHRITIS: ICD-10-CM

## 2018-07-30 DIAGNOSIS — M48.061 FORAMINAL STENOSIS OF LUMBAR REGION: ICD-10-CM

## 2018-07-30 PROCEDURE — 99213 OFFICE O/P EST LOW 20 MIN: CPT | Performed by: FAMILY MEDICINE

## 2018-07-30 RX ORDER — HYDROCODONE BITARTRATE AND ACETAMINOPHEN 5; 325 MG/1; MG/1
1 TABLET ORAL 2 TIMES DAILY PRN
Qty: 60 TAB | Refills: 0 | Status: SHIPPED | OUTPATIENT
Start: 2018-07-30 | End: 2018-08-30 | Stop reason: SDUPTHER

## 2018-07-30 NOTE — PROGRESS NOTES
"Subjective:      Fredy Gonsalez Jr. is a 72 y.o. male who presents with Back Pain            HPI 1. Chronic lumbar pain-patient reports pain will come on in his right paralumbar area little bit earlier walking just 2-1/2 blocks rather than free blocks as in the past. Reports persistent low back pain in the midline structures of his lumbar and sacral levels. No urinary incontinence.  2. Atypical chest pain-patient reports a prickly brief intermittent chest pain that developed about 2 months ago and was multiple times per day at her last visit is now occurring only about once per week. It still has not been consistently associated with any particular activity or body position. I had neglected to send off his cardiology referral request so that visit has not happened. Patient underwent three-vessel bypass in [facility] in December 2017.    ROS no current dyspnea, cough, nausea       Objective:     /58   Pulse 84   Temp 36.9 °C (98.5 °F)   Resp 16   Ht 1.702 m (5' 7.01\")   Wt 68.7 kg (151 lb 6.4 oz)   SpO2 95%   BMI 23.71 kg/m²      Physical Exam  Gen.- alert, cooperative, in no acute distress  Neck- midline trachea, thyroid not enlarged or tender,supple, no cervical adenopathy  Chest-clear to auscultation and percussion with normal breath sounds. No retractions. Chest wall nontender  Cardiac- regular rhythm and rate. No murmur, thrill, or heave  Back-1+ tender from L4-S3 in the midline area. ParaSpinous areas are nontender to palpation          Assessment/Plan:     1. Arthritis    - HYDROcodone-acetaminophen (NORCO) 5-325 MG Tab per tablet; Take 1 Tab by mouth 2 times a day as needed for up to 30 days. Prescription must last 30 days.  Dispense: 60 Tab; Refill: 0    2. Foraminal stenosis of lumbar region-persistent    - HYDROcodone-acetaminophen (NORCO) 5-325 MG Tab per tablet; Take 1 Tab by mouth 2 times a day as needed for up to 30 days. Prescription must last 30 days.  Dispense: 60 Tab; Refill: " 0    3. Atypical chest pain-improved    Plan: 1. Will renew Norco for 2 months  2. Will set up cardiology referral for coronary artery disease  3. Annual wellness visit 1 month

## 2018-08-30 ENCOUNTER — OFFICE VISIT (OUTPATIENT)
Dept: MEDICAL GROUP | Facility: MEDICAL CENTER | Age: 73
End: 2018-08-30
Attending: FAMILY MEDICINE
Payer: MEDICARE

## 2018-08-30 VITALS
HEART RATE: 72 BPM | RESPIRATION RATE: 16 BRPM | HEIGHT: 67 IN | DIASTOLIC BLOOD PRESSURE: 56 MMHG | BODY MASS INDEX: 24.8 KG/M2 | WEIGHT: 158 LBS | OXYGEN SATURATION: 97 % | TEMPERATURE: 97.2 F | SYSTOLIC BLOOD PRESSURE: 102 MMHG

## 2018-08-30 DIAGNOSIS — M48.061 FORAMINAL STENOSIS OF LUMBAR REGION: ICD-10-CM

## 2018-08-30 DIAGNOSIS — G89.29 CHRONIC LOW BACK PAIN WITHOUT SCIATICA, UNSPECIFIED BACK PAIN LATERALITY: ICD-10-CM

## 2018-08-30 DIAGNOSIS — M54.50 CHRONIC LOW BACK PAIN WITHOUT SCIATICA, UNSPECIFIED BACK PAIN LATERALITY: ICD-10-CM

## 2018-08-30 DIAGNOSIS — I25.10 CORONARY ARTERY DISEASE INVOLVING NATIVE CORONARY ARTERY OF NATIVE HEART WITHOUT ANGINA PECTORIS: ICD-10-CM

## 2018-08-30 DIAGNOSIS — F11.20 OPIOID TYPE DEPENDENCE, CONTINUOUS (HCC): ICD-10-CM

## 2018-08-30 DIAGNOSIS — Z72.0 TOBACCO ABUSE: ICD-10-CM

## 2018-08-30 DIAGNOSIS — J43.1 PANLOBULAR EMPHYSEMA (HCC): ICD-10-CM

## 2018-08-30 DIAGNOSIS — M19.90 ARTHRITIS: ICD-10-CM

## 2018-08-30 DIAGNOSIS — R93.89 ABNORMAL CHEST CT: ICD-10-CM

## 2018-08-30 DIAGNOSIS — Z79.899 HIGH RISK MEDICATION USE: ICD-10-CM

## 2018-08-30 PROCEDURE — G0439 PPPS, SUBSEQ VISIT: HCPCS | Performed by: FAMILY MEDICINE

## 2018-08-30 RX ORDER — HYDROCODONE BITARTRATE AND ACETAMINOPHEN 5; 325 MG/1; MG/1
1 TABLET ORAL 2 TIMES DAILY PRN
Qty: 60 TAB | Refills: 0 | Status: SHIPPED | OUTPATIENT
Start: 2018-08-30 | End: 2018-09-27 | Stop reason: SDUPTHER

## 2018-08-30 ASSESSMENT — ACTIVITIES OF DAILY LIVING (ADL): BATHING_REQUIRES_ASSISTANCE: 0

## 2018-08-30 ASSESSMENT — PATIENT HEALTH QUESTIONNAIRE - PHQ9: CLINICAL INTERPRETATION OF PHQ2 SCORE: 0

## 2018-08-30 ASSESSMENT — PAIN SCALES - GENERAL: PAINLEVEL: 4=SLIGHT-MODERATE PAIN

## 2018-08-30 ASSESSMENT — ENCOUNTER SYMPTOMS: GENERAL WELL-BEING: FAIR

## 2018-08-30 NOTE — PROGRESS NOTES
Chief Complaint   Patient presents with   • Annual Exam     AWV         HPI:  Fredy Gonsalez . is a 72 y.o. here for Medicare Annual Wellness Visit     Patient Active Problem List    Diagnosis Date Noted   • Coronary artery disease involving native coronary artery of native heart without angina pectoris 04/30/2018   • Bilateral hip pain 02/16/2017   • Chronic lumbar pain 07/21/2016   • Lung nodule < 6cm on CT 07/21/2016   • IFG (impaired fasting glucose) 03/18/2016   • Opioid type dependence, continuous ([facility]) 06/25/2015   • Dental caries 03/17/2015   • Back pain 03/17/2015   • Hyperlipidemia 04/17/2012   • COPD (chronic obstructive pulmonary disease) ([facility]) 04/17/2012   • AMIRA (obstructive sleep apnea) 04/17/2012   • Arthritis 11/11/2011   • Tobacco abuse 11/11/2011       Current Outpatient Prescriptions   Medication Sig Dispense Refill   • nitroglycerin (NITROSTAT) 0.4 MG SL Tab Place 1 Tab under tongue as needed for Chest Pain. 25 Tab 1   • aspirin EC (ECOTRIN) 81 MG Tablet Delayed Response Take 81 mg by mouth every day.     • atorvastatin (LIPITOR) 40 MG Tab Take 1 Tab by mouth every day. 30 Tab 11   • clopidogrel (PLAVIX) 75 MG Tab Take 1 Tab by mouth every day. 30 Tab 11   • lisinopril (PRINIVIL) 5 MG Tab Take 1 Tab by mouth every day. 30 Tab 11   • metoprolol SR (TOPROL XL) 50 MG TABLET SR 24 HR Take 1 Tab by mouth every day. 30 Tab 11   • meloxicam (MOBIC) 7.5 MG Tab TAKE 1 TABLET BY MOUTH DAILY 30 Tab 11   • SYMBICORT 160-4.5 MCG/ACT Aerosol INHALE 2 PUFFS BY MOUTH TWICE DAILY 10.2 g 3   • budesonide-formoterol (SYMBICORT) 80-4.5 MCG/ACT Aerosol Inhale 2 Puffs by mouth 2 Times a Day. 1 Inhaler 11   • albuterol 108 (90 BASE) MCG/ACT Aero Soln inhalation aerosol Inhale 2 Puffs by mouth every 6 hours as needed for Shortness of Breath. 8.5 g 11   • ADVAIR DISKUS 250-50 MCG/DOSE AEROSOL POWDER, BREATH ACTIVATED INL 1 PUFF PO Q 12 H  6   • albuterol (PROAIR HFA) 108 (90 BASE) MCG/ACT Aero Soln inhalation  aerosol Inhale 2 Puffs by mouth every 6 hours as needed. 8.5 g 3   • tiotropium (SPIRIVA HANDIHALER) 18 MCG Cap INHALE CONTENTS OF ONE CAPSULE BY MOUTH USING HANDIHALER 30 Cap 6   • albuterol 108 (90 BASE) MCG/ACT Aero Soln inhalation aerosol Inhale 2 Puffs by mouth every four hours as needed for Shortness of Breath. Inhale 2 puffs every 4 hours as needed for shortness of breath.       No current facility-administered medications for this visit.             Current supplements as per medication list.       Allergies: Diclofenac sodium and Fish    [facility] contact[facility]activities[facility]     He  reports that he has been smoking Cigarettes.  He started smoking about 61 years ago. He has a 15.00 pack-year smoking history. He has quit using smokeless tobacco. He reports that he does not drink alcohol or use drugs.  Ready to quit: Not Answered  Counseling given: Not Answered      DPA/Advanced Directive:  Patient does not have an Advanced Directive.  A packet and workshop information was given on Advanced Directives.    ROS:    Gait: Uses no assistive device  Ostomy: No  Other tubes: No  Amputations: No  Chronic oxygen use: No  Last eye exam: 2 years ago  Wears hearing aids: No   : Denies any urinary leakage during the last 6 months    Screening:    Depression Screening    Little interest or pleasure in doing things?  0 - not at all  Feeling down, depressed , or hopeless? 0 - not at all  Patient Health Questionnaire Score: 0     If depressive symptoms identified deferred to follow up visit unless specifically addressed in assessment and plan.    Interpretation of PHQ-9 Total Score   Score Severity   1-4 No Depression   5-9 Mild Depression   10-14 Moderate Depression   15-19 Moderately Severe Depression   20-27 Severe Depression    Screening for Cognitive Impairment    Three Minute Recall (leader, season, table)  /3    Akil clock face with all 12 numbers and set the hands to show 10 past 11.       Cognitive  concerns identified deferred for follow up unless specifically addressed in assessment and plan.    Fall Risk Assessment    Has the patient had two or more falls in the last year or any fall with injury in the last year?  No    Safety Assessment    Throw rugs on floor.  No  Handrails on all stairs.  Yes  Good lighting in all hallways.  Yes  Difficulty hearing.  Yes  Patient counseled about all safety risks that were identified.    Functional Assessment ADLs    Are there any barriers preventing you from cooking for yourself or meeting nutritional needs?  No.    Are there any barriers preventing you from driving safely or obtaining transportation?  No.    Are there any barriers preventing you from using a telephone or calling for help?  No.    Are there any barriers preventing you from shopping?  No.    Are there any barriers preventing you from taking care of your own finances?  No.    Are there any barriers preventing you from managing your medications?  No.    Are there any barriers preventing you from showering, bathing or dressing yourself?  No.    Are you currently engaging in any exercise or physical activity?  Yes.     What is your perception of your health?  Fair.      Health Maintenance Summary                IMM HEP B VACCINE Overdue 11/13/1966     IMM ZOSTER VACCINES Overdue 11/13/1997     IMM PNEUMOCOCCAL 65+ (ADULT) LOW/MEDIUM RISK SERIES Overdue 9/28/2016      Done 9/28/2015 Imm Admin: Pneumococcal Conjugate Vaccine (Prevnar/PCV-13)    PFT SCREENING-FEV1 AND FEV/FVC RATIO / SPIROMETRY SHOULD BE PERFORMED ANNUALLY Overdue 10/7/2017      Done 10/7/2016 AMB PULMONARY FUNCTION TEST/LAB     Patient has more history with this topic...    Annual Wellness Visit Overdue 11/5/2017      Done 11/4/2016      Patient has more history with this topic...    IMM INFLUENZA Next Due 9/1/2018      Done 10/20/2016 Imm Admin: Influenza Vaccine Quad Inj (Preserved)     Patient has more history with this topic...    COLON  "CANCER SCREENING ANNUAL FIT Next Due 5/1/2019      Done 5/1/2018 OCCULT BLOOD FECES IMMUNOASSAY     Patient has more history with this topic...    IMM DTaP/Tdap/Td Vaccine Next Due 12/30/2025      Done 12/30/2015 Imm Admin: Tdap Vaccine          Patient Care Team:  Robert Lindo M.D. as PCP - General (Family Medicine)        Social History   Substance Use Topics   • Smoking status: Current Every Day Smoker     Packs/day: 0.25     Years: 60.00     Types: Cigarettes     Start date: 6/15/1957   • Smokeless tobacco: Former User      Comment: down to 1 pack per week   • Alcohol use No      Comment: quit 28yrs ago     Family History   Problem Relation Age of Onset   • Arthritis Mother    • Lung Disease Mother    • Osteoporosis Mother    • Alzheimer's Disease Mother    • Other Mother         parkinsons   • Kidney Disease Father    • Cancer Neg Hx    • Diabetes Neg Hx    • Heart Disease Neg Hx    • Stroke Neg Hx      He  has a past medical history of Arthritis; COPD; EMPHYSEMA; Heart attack (HCC) (12/02/2017); and Hyperlipidemia (4/17/2012). He also has no past medical history of Diabetes or Hypertension.   Past Surgical History:   Procedure Laterality Date   • STENT PLACEMENT  12/02/2017    left main to proximal LAD, mid LAD, ostial left main   • OPEN REDUCTION      left arm. 32 yrs ago       Exam:   Blood pressure 102/56, pulse 72, temperature 36.2 °C (97.2 °F), resp. rate 16, height 1.702 m (5' 7.01\"), weight 71.7 kg (158 lb), SpO2 97 %. Body mass index is 24.74 kg/m².  Cardiac-regular rate and rhythm. No heave or thrill. Murmur absent  Chest- clear breath sounds without wheezes, rales, ronchi. No retractions. Chest wall nontender.  Hearing fair.    Dentition poor  Alert, oriented in no acute distress.  Eye contact is good, speech goal directed, affect calm    Assessment and Plan. The following treatment and monitoring plan is recommended:    1. Coronary artery disease involving native coronary artery of native " heart without angina pectoris     2. Chronic low back pain without sciatica, unspecified back pain laterality     3. Opioid type dependence, continuous (HCC)     4. Panlobular emphysema (HCC)     5. Tobacco abuse           Services suggested: No services needed at this time  Health Care Screening: Age-appropriate preventive services recommended by USPTF and ACIP covered by Medicare were discussed today. Services ordered if indicated and agreed upon by the patient.  Referrals offered: Community-based lifestyle interventions to reduce health risks and promote self-management and wellness, fall prevention, nutrition, physical activity, tobacco-use cessation, weight loss, and mental health services as per orders if indicated.    Discussion today about general wellness and lifestyle habits:    · Prevent falls and reduce trip hazards; Cautioned about securing or removing rugs.  · Have a working fire alarm and carbon monoxide detector;   · Engage in regular physical activity and social activities     Follow-up: 1. Recommend reduction on daily tobacco use  2. Patient is encouraged to reconnect with his dentist which he was pursuing prior to his trip to [place] last year  3. Patient is encouraged to do a follow-up ophthalmology exam-cost is a concern  4. Atypical chest pain is still occurring minimally about once per week. Cardiology appointment scheduled 10/12/18  5. Update CT of the chest due to previous groundglass opacities, pulmonary nodule

## 2018-08-31 RX ORDER — NITROGLYCERIN 0.4 MG/1
TABLET SUBLINGUAL
Qty: 25 TAB | Refills: 0 | Status: SHIPPED | OUTPATIENT
Start: 2018-08-31 | End: 2019-12-01

## 2018-09-04 ENCOUNTER — HOSPITAL ENCOUNTER (OUTPATIENT)
Dept: LAB | Facility: MEDICAL CENTER | Age: 73
End: 2018-09-04
Attending: FAMILY MEDICINE
Payer: MEDICARE

## 2018-09-04 DIAGNOSIS — J43.1 PANLOBULAR EMPHYSEMA (HCC): ICD-10-CM

## 2018-09-04 DIAGNOSIS — Z79.899 HIGH RISK MEDICATION USE: ICD-10-CM

## 2018-09-04 LAB
ALBUMIN SERPL BCP-MCNC: 3.6 G/DL (ref 3.2–4.9)
ALBUMIN/GLOB SERPL: 1.3 G/DL
ALP SERPL-CCNC: 58 U/L (ref 30–99)
ALT SERPL-CCNC: 11 U/L (ref 2–50)
ANION GAP SERPL CALC-SCNC: 7 MMOL/L (ref 0–11.9)
AST SERPL-CCNC: 13 U/L (ref 12–45)
BASOPHILS # BLD AUTO: 0.7 % (ref 0–1.8)
BASOPHILS # BLD: 0.05 K/UL (ref 0–0.12)
BILIRUB SERPL-MCNC: 0.5 MG/DL (ref 0.1–1.5)
BUN SERPL-MCNC: 21 MG/DL (ref 8–22)
CALCIUM SERPL-MCNC: 8.8 MG/DL (ref 8.5–10.5)
CHLORIDE SERPL-SCNC: 107 MMOL/L (ref 96–112)
CO2 SERPL-SCNC: 26 MMOL/L (ref 20–33)
CREAT SERPL-MCNC: 1.45 MG/DL (ref 0.5–1.4)
EOSINOPHIL # BLD AUTO: 0.46 K/UL (ref 0–0.51)
EOSINOPHIL NFR BLD: 6.8 % (ref 0–6.9)
ERYTHROCYTE [DISTWIDTH] IN BLOOD BY AUTOMATED COUNT: 50.8 FL (ref 35.9–50)
GLOBULIN SER CALC-MCNC: 2.8 G/DL (ref 1.9–3.5)
GLUCOSE SERPL-MCNC: 115 MG/DL (ref 65–99)
HCT VFR BLD AUTO: 42.9 % (ref 42–52)
HGB BLD-MCNC: 14 G/DL (ref 14–18)
IMM GRANULOCYTES # BLD AUTO: 0.02 K/UL (ref 0–0.11)
IMM GRANULOCYTES NFR BLD AUTO: 0.3 % (ref 0–0.9)
LYMPHOCYTES # BLD AUTO: 2 K/UL (ref 1–4.8)
LYMPHOCYTES NFR BLD: 29.7 % (ref 22–41)
MCH RBC QN AUTO: 33.6 PG (ref 27–33)
MCHC RBC AUTO-ENTMCNC: 32.6 G/DL (ref 33.7–35.3)
MCV RBC AUTO: 102.9 FL (ref 81.4–97.8)
MONOCYTES # BLD AUTO: 0.6 K/UL (ref 0–0.85)
MONOCYTES NFR BLD AUTO: 8.9 % (ref 0–13.4)
NEUTROPHILS # BLD AUTO: 3.61 K/UL (ref 1.82–7.42)
NEUTROPHILS NFR BLD: 53.6 % (ref 44–72)
NRBC # BLD AUTO: 0 K/UL
NRBC BLD-RTO: 0 /100 WBC
PLATELET # BLD AUTO: 218 K/UL (ref 164–446)
PMV BLD AUTO: 9.6 FL (ref 9–12.9)
POTASSIUM SERPL-SCNC: 4.3 MMOL/L (ref 3.6–5.5)
PROT SERPL-MCNC: 6.4 G/DL (ref 6–8.2)
RBC # BLD AUTO: 4.17 M/UL (ref 4.7–6.1)
SODIUM SERPL-SCNC: 140 MMOL/L (ref 135–145)
WBC # BLD AUTO: 6.7 K/UL (ref 4.8–10.8)

## 2018-09-04 PROCEDURE — 85025 COMPLETE CBC W/AUTO DIFF WBC: CPT

## 2018-09-04 PROCEDURE — 80053 COMPREHEN METABOLIC PANEL: CPT

## 2018-09-04 PROCEDURE — 36415 COLL VENOUS BLD VENIPUNCTURE: CPT

## 2018-09-24 ENCOUNTER — HOSPITAL ENCOUNTER (OUTPATIENT)
Dept: RADIOLOGY | Facility: MEDICAL CENTER | Age: 73
End: 2018-09-24
Attending: FAMILY MEDICINE
Payer: MEDICARE

## 2018-09-24 DIAGNOSIS — R93.89 ABNORMAL CHEST CT: ICD-10-CM

## 2018-09-24 PROCEDURE — 700117 HCHG RX CONTRAST REV CODE 255: Performed by: FAMILY MEDICINE

## 2018-09-24 PROCEDURE — 71260 CT THORAX DX C+: CPT

## 2018-09-24 RX ADMIN — IOHEXOL 75 ML: 350 INJECTION, SOLUTION INTRAVENOUS at 08:11

## 2018-09-27 ENCOUNTER — OFFICE VISIT (OUTPATIENT)
Dept: MEDICAL GROUP | Facility: MEDICAL CENTER | Age: 73
End: 2018-09-27
Attending: FAMILY MEDICINE
Payer: MEDICARE

## 2018-09-27 ENCOUNTER — TELEPHONE (OUTPATIENT)
Dept: MEDICAL GROUP | Facility: MEDICAL CENTER | Age: 73
End: 2018-09-27

## 2018-09-27 VITALS
WEIGHT: 158 LBS | BODY MASS INDEX: 24.8 KG/M2 | HEIGHT: 67 IN | SYSTOLIC BLOOD PRESSURE: 108 MMHG | DIASTOLIC BLOOD PRESSURE: 60 MMHG | RESPIRATION RATE: 16 BRPM | HEART RATE: 88 BPM | OXYGEN SATURATION: 97 % | TEMPERATURE: 98 F

## 2018-09-27 DIAGNOSIS — M19.90 ARTHRITIS: ICD-10-CM

## 2018-09-27 DIAGNOSIS — M48.061 FORAMINAL STENOSIS OF LUMBAR REGION: ICD-10-CM

## 2018-09-27 PROCEDURE — 99213 OFFICE O/P EST LOW 20 MIN: CPT | Performed by: FAMILY MEDICINE

## 2018-09-27 RX ORDER — HYDROCODONE BITARTRATE AND ACETAMINOPHEN 5; 325 MG/1; MG/1
1 TABLET ORAL 2 TIMES DAILY PRN
Qty: 60 TAB | Refills: 0 | Status: SHIPPED | OUTPATIENT
Start: 2018-09-27 | End: 2018-10-25 | Stop reason: SDUPTHER

## 2018-09-27 ASSESSMENT — PAIN SCALES - GENERAL: PAINLEVEL: 4=SLIGHT-MODERATE PAIN

## 2018-09-27 NOTE — PROGRESS NOTES
"Subjective:      Fredy Gonsalez Jr. is a 72 y.o. male who presents with No chief complaint on file.            HPI 1. Lumbar foraminal stenosis-patient ports continued low back pain the rises up to level of 8 when he is walking (lower back and bilateral hips). He is managing pain using Norco 5/325 twice daily. He is stable in his use of that medication. Reports that he has been riding his bike successfully recently. Not reporting any urinary or fecal incontinence.  2. Smoking cessation-patient agrees he is smoking too much, currently just under pack per day. Notes dyspnea with moderately strenuous activity. Does note a brief morning cough    ROS negative for hemoptysis, no recent chest pain (that was occurring 2 months ago), constipation       Objective:     /60 (BP Location: Left arm, Patient Position: Sitting, BP Cuff Size: Adult)   Pulse 88   Temp 36.7 °C (98 °F) (Temporal)   Resp 16   Ht 1.702 m (5' 7.01\")   Wt 71.7 kg (158 lb)   SpO2 97%   BMI 24.74 kg/m²      Physical Exam   Gen.- alert, cooperative, in no acute distress  Neck- midline trachea, thyroid not enlarged or tender,supple, no cervical adenopathy  Chest-clear to auscultation and percussion with normal breath sounds. No retractions. Chest wall nontender  Cardiac- regular rhythm and rate. No murmur, thrill, or heave  Back-mild tenderness in the lower midline from L5-S4 and the left parasacral area.  Lower extremities-intact light touch, intact strength          Assessment/Plan:     1. Arthritis    - HYDROcodone-acetaminophen (NORCO) 5-325 MG Tab per tablet; Take 1 Tab by mouth 2 times a day as needed for up to 30 days. Prescription must last 30 days.  Dispense: 60 Tab; Refill: 0    2. Foraminal stenosis of lumbar region    - HYDROcodone-acetaminophen (NORCO) 5-325 MG Tab per tablet; Take 1 Tab by mouth 2 times a day as needed for up to 30 days. Prescription must last 30 days.  Dispense: 60 Tab; Refill: 0  Plan: 1. Patient is " planning on not buying any further tobacco when his current supply is exhausted.  2. Cardiology evaluation coming up in October  3. Renew Norco today  4. Patient is encouraged to discontinue tobacco, revisit one month

## 2018-10-09 ENCOUNTER — OFFICE VISIT (OUTPATIENT)
Dept: CARDIOLOGY | Facility: MEDICAL CENTER | Age: 73
End: 2018-10-09
Payer: MEDICARE

## 2018-10-09 ENCOUNTER — TELEPHONE (OUTPATIENT)
Dept: CARDIOLOGY | Facility: MEDICAL CENTER | Age: 73
End: 2018-10-09

## 2018-10-09 VITALS
RESPIRATION RATE: 14 BRPM | WEIGHT: 159 LBS | SYSTOLIC BLOOD PRESSURE: 110 MMHG | HEIGHT: 67 IN | DIASTOLIC BLOOD PRESSURE: 70 MMHG | OXYGEN SATURATION: 98 % | BODY MASS INDEX: 24.96 KG/M2 | HEART RATE: 60 BPM

## 2018-10-09 DIAGNOSIS — I73.9 PVD (PERIPHERAL VASCULAR DISEASE) (HCC): ICD-10-CM

## 2018-10-09 DIAGNOSIS — I25.2 HISTORY OF MYOCARDIAL INFARCTION: ICD-10-CM

## 2018-10-09 DIAGNOSIS — Z72.0 TOBACCO ABUSE: ICD-10-CM

## 2018-10-09 DIAGNOSIS — Z95.5 S/P CORONARY ARTERY STENT PLACEMENT: ICD-10-CM

## 2018-10-09 DIAGNOSIS — R07.2 PRECORDIAL PAIN: ICD-10-CM

## 2018-10-09 DIAGNOSIS — I25.10 CORONARY ARTERY DISEASE, OCCLUSIVE: ICD-10-CM

## 2018-10-09 DIAGNOSIS — R09.89 FEMORAL BRUIT: ICD-10-CM

## 2018-10-09 PROBLEM — I70.219 ATHEROSCLEROSIS OF NATIVE ARTERIES OF EXTREMITY WITH INTERMITTENT CLAUDICATION (HCC): Status: ACTIVE | Noted: 2018-10-09

## 2018-10-09 LAB — EKG IMPRESSION: NORMAL

## 2018-10-09 PROCEDURE — 93000 ELECTROCARDIOGRAM COMPLETE: CPT | Performed by: INTERNAL MEDICINE

## 2018-10-09 PROCEDURE — 99204 OFFICE O/P NEW MOD 45 MIN: CPT | Mod: 25 | Performed by: INTERNAL MEDICINE

## 2018-10-09 ASSESSMENT — ENCOUNTER SYMPTOMS
DIZZINESS: 1
SHORTNESS OF BREATH: 1
WHEEZING: 1
INSOMNIA: 0
FEVER: 0
LOSS OF CONSCIOUSNESS: 0
ORTHOPNEA: 0
BLURRED VISION: 1
ABDOMINAL PAIN: 0
SPEECH CHANGE: 1
COUGH: 1
PALPITATIONS: 0
PND: 0
MYALGIAS: 0
CHILLS: 0
WEAKNESS: 1

## 2018-10-09 NOTE — PROGRESS NOTES
"Chief Complaint   Patient presents with   • Chest Pain       Subjective:   Fredy Gonsalez Jr. is a 72 y.o. male who presents today furred by his PCP Dr. Robert Parr for ongoing management of coronary artery disease.    According to the patient he was hospitalized at [facility] in [place], [place] where he was hospitalized in  for a \"heart attack\".  The patient had both coronary intervention of the left main ostium, left main, LAD and mid LAD with 3 separate stents and CABG the sequence of which the patient cannot explain and there are no medical records available.    The patient states that he is compliant with his medications.  The patient continues to smoke a pack of cigarettes a day and is done so for \"60 years\".    The patient reports having nonexertional chest pains.  The patient reports severe bilateral hip pain with walking relieved with rest.    Family history  No heart disease.  Father  age 32 of kidney disease.    Past Medical History:   Diagnosis Date   • Arthritis    • COPD    • EMPHYSEMA    • Heart attack (HCC) 2017    received 3 stents, [facility]   • Hyperlipidemia 2012     Past Surgical History:   Procedure Laterality Date   • STENT PLACEMENT  2017    left main to proximal LAD, mid LAD, ostial left main   • OPEN REDUCTION      left arm. 32 yrs ago     Family History   Problem Relation Age of Onset   • Arthritis Mother    • Lung Disease Mother    • Osteoporosis Mother    • Alzheimer's Disease Mother    • Other Mother         parkinsons   • Kidney Disease Father    • Cancer Neg Hx    • Diabetes Neg Hx    • Heart Disease Neg Hx    • Stroke Neg Hx      Social History     Social History   • Marital status: Single     Spouse name: N/A   • Number of children: N/A   • Years of education: N/A     Occupational History   • Not on file.     Social History Main Topics   • Smoking status: Current Every Day Smoker     Packs/day: 0.25     " Years: 60.00     Types: Cigarettes     Start date: 6/15/1957   • Smokeless tobacco: Former User      Comment: down to 1 pack per week   • Alcohol use No      Comment: quit 28yrs ago   • Drug use: No   • Sexual activity: No     Other Topics Concern   • Not on file     Social History Narrative   • No narrative on file     Allergies   Allergen Reactions   • Chantix [Varenicline]      Made patient feel sick.   • Fish Vomiting     Outpatient Encounter Prescriptions as of 10/9/2018   Medication Sig Dispense Refill   • HYDROcodone-acetaminophen (NORCO) 5-325 MG Tab per tablet Take 1 Tab by mouth 2 times a day as needed for up to 30 days. Prescription must last 30 days. 60 Tab 0   • nitroglycerin (NITROSTAT) 0.4 MG SL Tab ONE TABLET UNDER TONGUE AS NEEDED FOR CHEST PAIN 25 Tab 0   • aspirin EC (ECOTRIN) 81 MG Tablet Delayed Response Take 81 mg by mouth every day.     • atorvastatin (LIPITOR) 40 MG Tab Take 1 Tab by mouth every day. 30 Tab 11   • clopidogrel (PLAVIX) 75 MG Tab Take 1 Tab by mouth every day. 30 Tab 11   • lisinopril (PRINIVIL) 5 MG Tab Take 1 Tab by mouth every day. 30 Tab 11   • metoprolol SR (TOPROL XL) 50 MG TABLET SR 24 HR Take 1 Tab by mouth every day. 30 Tab 11   • ADVAIR DISKUS 250-50 MCG/DOSE AEROSOL POWDER, BREATH ACTIVATED INL 1 PUFF PO Q 12 H  6   • albuterol (PROAIR HFA) 108 (90 BASE) MCG/ACT Aero Soln inhalation aerosol Inhale 2 Puffs by mouth every 6 hours as needed. 8.5 g 3   • meloxicam (MOBIC) 7.5 MG Tab TAKE 1 TABLET BY MOUTH DAILY (Patient not taking: Reported on 10/9/2018) 30 Tab 11   • SYMBICORT 160-4.5 MCG/ACT Aerosol INHALE 2 PUFFS BY MOUTH TWICE DAILY (Patient not taking: Reported on 10/9/2018) 10.2 g 3   • budesonide-formoterol (SYMBICORT) 80-4.5 MCG/ACT Aerosol Inhale 2 Puffs by mouth 2 Times a Day. (Patient not taking: Reported on 10/9/2018) 1 Inhaler 11   • albuterol 108 (90 BASE) MCG/ACT Aero Soln inhalation aerosol Inhale 2 Puffs by mouth every 6 hours as needed for Shortness of  "Breath. (Patient not taking: Reported on 10/9/2018) 8.5 g 11   • tiotropium (SPIRIVA HANDIHALER) 18 MCG Cap INHALE CONTENTS OF ONE CAPSULE BY MOUTH USING HANDIHALER (Patient not taking: Reported on 10/9/2018) 30 Cap 6   • albuterol 108 (90 BASE) MCG/ACT Aero Soln inhalation aerosol Inhale 2 Puffs by mouth every four hours as needed for Shortness of Breath. Inhale 2 puffs every 4 hours as needed for shortness of breath.       No facility-administered encounter medications on file as of 10/9/2018.      Review of Systems   Constitutional: Negative for chills and fever.   HENT: Negative for congestion.    Eyes: Positive for blurred vision.   Respiratory: Positive for cough, shortness of breath and wheezing.    Cardiovascular: Positive for chest pain and leg swelling. Negative for palpitations, orthopnea and PND.   Gastrointestinal: Negative for abdominal pain.   Genitourinary: Negative for dysuria.   Musculoskeletal: Negative for joint pain and myalgias.   Skin: Negative for rash.   Neurological: Positive for dizziness, speech change and weakness. Negative for loss of consciousness.   Psychiatric/Behavioral: The patient does not have insomnia.         Objective:   /70 (BP Location: Left arm, Patient Position: Sitting, BP Cuff Size: Adult)   Pulse 60   Resp 14   Ht 1.702 m (5' 7\")   Wt 72.1 kg (159 lb)   SpO2 98%   BMI 24.90 kg/m²     Physical Exam   Constitutional: He is oriented to person, place, and time. He appears well-developed and well-nourished.   HENT:   Head: Normocephalic and atraumatic.   Eyes: Pupils are equal, round, and reactive to light. EOM are normal. No scleral icterus.   Neck: Neck supple. No JVD present. No thyromegaly present.   Cardiovascular: Normal rate, regular rhythm, normal heart sounds and intact distal pulses.  Exam reveals no gallop and no friction rub.    No murmur heard.  Pulses:       Carotid pulses are 0 on the right side, and 1+ on the left side.       Femoral pulses are 1+ " on the right side with bruit, and 1+ on the left side with bruit.       Posterior tibial pulses are 1+ on the right side, and 1+ on the left side.   Pulmonary/Chest: Effort normal and breath sounds normal. No respiratory distress. He has no wheezes. He has no rales.   Abdominal: Soft. Bowel sounds are normal. He exhibits no mass. There is no tenderness.   Musculoskeletal: Normal range of motion. He exhibits no edema or deformity.   Lymphadenopathy:     He has no cervical adenopathy.   Neurological: He is alert and oriented to person, place, and time. No cranial nerve deficit. He exhibits normal muscle tone.   Skin: Skin is warm and dry.   Psychiatric: He has a normal mood and affect. His behavior is normal.     10/09/2018 EKG: Normal sinus rhythm, rate 61.  Inferior posterior myocardial infarction.  Reviewed by myself.    Assessment:     1. Precordial pain  EKG   2. Coronary artery disease, occlusive     3. S/P coronary artery stent placement     4. History of myocardial infarction     5. PVD (peripheral vascular disease) (HCC)     6. Femoral bruit     7. Tobacco abuse         Medical Decision Making:  Today's Assessment / Status / Plan:     1.  The patient's chest pain is nonischemic.  2.  The patient's cardiac status is stable on current comprehensive medical regimen.  3.  The patient was counseled to stop smoking which he states that he is not plan to do since he enjoys smoking cigarettes.  4.  We will obtain medical records from [facility] in [place], [place] the [facility].  5.  We will make further recommendations as far as further evaluation after review of his medical records.  Follow-up 6 months.    Thank you for allow me see this gentleman in consultation.

## 2018-10-09 NOTE — LETTER
"     [facility]   [address] [place] [address] [address]  BHAV[place] Kumar [address]  Phone: [phone]  Fax: [phone]              Fredy Gonsalez Jr.  1945    Encounter Date: 10/9/2018    Brendon Tong M.D.          PROGRESS NOTE:  Chief Complaint   Patient presents with   • Chest Pain       Subjective:   Fredy Gonsalez Jr. is a 72 y.o. male who presents today furred by his PCP Dr. Robert Parr for ongoing management of coronary artery disease.    According to the patient he was hospitalized at [facility] in [place], [place] where he was hospitalized in  for a \"heart attack\".  The patient had both coronary intervention of the left main ostium, left main, LAD and mid LAD with 3 separate stents and CABG the sequence of which the patient cannot explain and there are no medical records available.    The patient states that he is compliant with his medications.  The patient continues to smoke a pack of cigarettes a day and is done so for \"60 years\".    The patient reports having nonexertional chest pains.  The patient reports severe bilateral hip pain with walking relieved with rest.    Family history  No heart disease.  Father  age 32 of kidney disease.    Past Medical History:   Diagnosis Date   • Arthritis    • COPD    • EMPHYSEMA    • Heart attack (HCC) 2017    received 3 stents, [facility]   • Hyperlipidemia 2012     Past Surgical History:   Procedure Laterality Date   • STENT PLACEMENT  2017    left main to proximal LAD, mid LAD, ostial left main   • OPEN REDUCTION      left arm. 32 yrs ago     Family History   Problem Relation Age of Onset   • Arthritis Mother    • Lung Disease Mother    • Osteoporosis Mother    • Alzheimer's Disease Mother    • Other Mother         parkinsons   • Kidney Disease Father    • Cancer Neg Hx    • Diabetes Neg Hx    • Heart Disease Neg Hx    • Stroke Neg Hx      "     Social History     Social History   • Marital status: Single     Spouse name: N/A   • Number of children: N/A   • Years of education: N/A     Occupational History   • Not on file.     Social History Main Topics   • Smoking status: Current Every Day Smoker     Packs/day: 0.25     Years: 60.00     Types: Cigarettes     Start date: 6/15/1957   • Smokeless tobacco: Former User      Comment: down to 1 pack per week   • Alcohol use No      Comment: quit 28yrs ago   • Drug use: No   • Sexual activity: No     Other Topics Concern   • Not on file     Social History Narrative   • No narrative on file     Allergies   Allergen Reactions   • Chantix [Varenicline]      Made patient feel sick.   • Fish Vomiting     Outpatient Encounter Prescriptions as of 10/9/2018   Medication Sig Dispense Refill   • HYDROcodone-acetaminophen (NORCO) 5-325 MG Tab per tablet Take 1 Tab by mouth 2 times a day as needed for up to 30 days. Prescription must last 30 days. 60 Tab 0   • nitroglycerin (NITROSTAT) 0.4 MG SL Tab ONE TABLET UNDER TONGUE AS NEEDED FOR CHEST PAIN 25 Tab 0   • aspirin EC (ECOTRIN) 81 MG Tablet Delayed Response Take 81 mg by mouth every day.     • atorvastatin (LIPITOR) 40 MG Tab Take 1 Tab by mouth every day. 30 Tab 11   • clopidogrel (PLAVIX) 75 MG Tab Take 1 Tab by mouth every day. 30 Tab 11   • lisinopril (PRINIVIL) 5 MG Tab Take 1 Tab by mouth every day. 30 Tab 11   • metoprolol SR (TOPROL XL) 50 MG TABLET SR 24 HR Take 1 Tab by mouth every day. 30 Tab 11   • ADVAIR DISKUS 250-50 MCG/DOSE AEROSOL POWDER, BREATH ACTIVATED INL 1 PUFF PO Q 12 H  6   • albuterol (PROAIR HFA) 108 (90 BASE) MCG/ACT Aero Soln inhalation aerosol Inhale 2 Puffs by mouth every 6 hours as needed. 8.5 g 3   • meloxicam (MOBIC) 7.5 MG Tab TAKE 1 TABLET BY MOUTH DAILY (Patient not taking: Reported on 10/9/2018) 30 Tab 11   • SYMBICORT 160-4.5 MCG/ACT Aerosol INHALE 2 PUFFS BY MOUTH TWICE DAILY (Patient not taking: Reported on 10/9/2018) 10.2 g 3   •  "budesonide-formoterol (SYMBICORT) 80-4.5 MCG/ACT Aerosol Inhale 2 Puffs by mouth 2 Times a Day. (Patient not taking: Reported on 10/9/2018) 1 Inhaler 11   • albuterol 108 (90 BASE) MCG/ACT Aero Soln inhalation aerosol Inhale 2 Puffs by mouth every 6 hours as needed for Shortness of Breath. (Patient not taking: Reported on 10/9/2018) 8.5 g 11   • tiotropium (SPIRIVA HANDIHALER) 18 MCG Cap INHALE CONTENTS OF ONE CAPSULE BY MOUTH USING HANDIHALER (Patient not taking: Reported on 10/9/2018) 30 Cap 6   • albuterol 108 (90 BASE) MCG/ACT Aero Soln inhalation aerosol Inhale 2 Puffs by mouth every four hours as needed for Shortness of Breath. Inhale 2 puffs every 4 hours as needed for shortness of breath.       No facility-administered encounter medications on file as of 10/9/2018.      Review of Systems   Constitutional: Negative for chills and fever.   HENT: Negative for congestion.    Eyes: Positive for blurred vision.   Respiratory: Positive for cough, shortness of breath and wheezing.    Cardiovascular: Positive for chest pain and leg swelling. Negative for palpitations, orthopnea and PND.   Gastrointestinal: Negative for abdominal pain.   Genitourinary: Negative for dysuria.   Musculoskeletal: Negative for joint pain and myalgias.   Skin: Negative for rash.   Neurological: Positive for dizziness, speech change and weakness. Negative for loss of consciousness.   Psychiatric/Behavioral: The patient does not have insomnia.         Objective:   /70 (BP Location: Left arm, Patient Position: Sitting, BP Cuff Size: Adult)   Pulse 60   Resp 14   Ht 1.702 m (5' 7\")   Wt 72.1 kg (159 lb)   SpO2 98%   BMI 24.90 kg/m²      Physical Exam   Constitutional: He is oriented to person, place, and time. He appears well-developed and well-nourished.   HENT:   Head: Normocephalic and atraumatic.   Eyes: Pupils are equal, round, and reactive to light. EOM are normal. No scleral icterus.   Neck: Neck supple. No JVD present. No " thyromegaly present.   Cardiovascular: Normal rate, regular rhythm, normal heart sounds and intact distal pulses.  Exam reveals no gallop and no friction rub.    No murmur heard.  Pulses:       Carotid pulses are 0 on the right side, and 1+ on the left side.       Femoral pulses are 1+ on the right side with bruit, and 1+ on the left side with bruit.       Posterior tibial pulses are 1+ on the right side, and 1+ on the left side.   Pulmonary/Chest: Effort normal and breath sounds normal. No respiratory distress. He has no wheezes. He has no rales.   Abdominal: Soft. Bowel sounds are normal. He exhibits no mass. There is no tenderness.   Musculoskeletal: Normal range of motion. He exhibits no edema or deformity.   Lymphadenopathy:     He has no cervical adenopathy.   Neurological: He is alert and oriented to person, place, and time. No cranial nerve deficit. He exhibits normal muscle tone.   Skin: Skin is warm and dry.   Psychiatric: He has a normal mood and affect. His behavior is normal.     10/09/2018 EKG: Normal sinus rhythm, rate 61.  Inferior posterior myocardial infarction.  Reviewed by myself.    Assessment:     1. Precordial pain  EKG   2. Coronary artery disease, occlusive     3. S/P coronary artery stent placement     4. History of myocardial infarction     5. PVD (peripheral vascular disease) (HCC)     6. Femoral bruit     7. Tobacco abuse         Medical Decision Making:  Today's Assessment / Status / Plan:     1.  The patient's chest pain is nonischemic.  2.  The patient's cardiac status is stable on current comprehensive medical regimen.  3.  The patient was counseled to stop smoking which he states that he is not plan to do since he enjoys smoking cigarettes.  4.  We will obtain medical records from [facility] in [place], [place] the [facility].  5.  We will make further recommendations as far as further evaluation after review of his medical  records.  Follow-up 6 months.    Thank you for allow me see this gentleman in consultation.        Robert Lindo M.D.  [address] [place] [id number]  VIA In Basket

## 2018-10-09 NOTE — TELEPHONE ENCOUNTER
[facility]/Medical Records  Address: [address] [place] [address]  Phone: [phone]  Fax: [phone]    [facility]  Address: [address], [place] [address]  MR Dept Fax: [phone]    I sent an WALT to obtain records from both [facility] from End-2017 for heart attack and CABG. All the records will be sent to [phone].

## 2018-10-25 ENCOUNTER — OFFICE VISIT (OUTPATIENT)
Dept: MEDICAL GROUP | Facility: MEDICAL CENTER | Age: 73
End: 2018-10-25
Attending: FAMILY MEDICINE
Payer: MEDICARE

## 2018-10-25 VITALS
WEIGHT: 162 LBS | DIASTOLIC BLOOD PRESSURE: 52 MMHG | OXYGEN SATURATION: 97 % | HEIGHT: 67 IN | HEART RATE: 92 BPM | TEMPERATURE: 97.9 F | BODY MASS INDEX: 25.43 KG/M2 | RESPIRATION RATE: 16 BRPM | SYSTOLIC BLOOD PRESSURE: 102 MMHG

## 2018-10-25 DIAGNOSIS — Z23 FLU VACCINE NEED: ICD-10-CM

## 2018-10-25 DIAGNOSIS — M48.061 FORAMINAL STENOSIS OF LUMBAR REGION: ICD-10-CM

## 2018-10-25 DIAGNOSIS — M19.90 ARTHRITIS: ICD-10-CM

## 2018-10-25 PROCEDURE — 99213 OFFICE O/P EST LOW 20 MIN: CPT | Performed by: FAMILY MEDICINE

## 2018-10-25 PROCEDURE — 90471 IMMUNIZATION ADMIN: CPT

## 2018-10-25 RX ORDER — HYDROCODONE BITARTRATE AND ACETAMINOPHEN 5; 325 MG/1; MG/1
1 TABLET ORAL 2 TIMES DAILY PRN
Qty: 60 TAB | Refills: 0 | Status: SHIPPED | OUTPATIENT
Start: 2018-10-25 | End: 2018-11-26 | Stop reason: SDUPTHER

## 2018-10-25 ASSESSMENT — PAIN SCALES - GENERAL: PAINLEVEL: 4=SLIGHT-MODERATE PAIN

## 2018-10-25 NOTE — PROGRESS NOTES
"Subjective:      Fredy Gonsalez Jr. is a 72 y.o. male who presents with Follow-Up            HPI 1.  Lumbar pain with sciatica-patient reports that he continues to experience numbness diffusely in his left leg after walking perhaps 3 blocks.  He also reports increased pain in both hips recently even when nonweightbearing.  Previous hip x-rays 2 years ago showed mild osteoarthritic change bilaterally.  Lumbar MRI 3 years ago showed moderate central canal stenosis and bilateral foraminal stenosis at L4-5 level only.    ROS positive for slightly productive cough (clear sputum), negative for hemoptysis, urinary incontinence, fecal incontinence       Objective:     /52 (BP Location: Left arm, Patient Position: Sitting, BP Cuff Size: Adult)   Pulse 92   Temp 36.6 °C (97.9 °F) (Temporal)   Resp 16   Ht 1.702 m (5' 7\")   Wt 73.5 kg (162 lb)   SpO2 97%   BMI 25.37 kg/m²      Physical Exam  General-alert cooperative male in no acute distress  Back-1+ tender in the midline from L5 through S5 and mildly tender over the left SI area as well.  Lower extremities-intact light touch, intact distal strength.  Patellar and Achilles reflexes are 2+ and equal bilaterally.          Assessment/Plan:     1. Arthritis    - DX-HIP-BILATERAL-WITH PELVIS-2 VIEWS; Future  - HYDROcodone-acetaminophen (NORCO) 5-325 MG Tab per tablet; Take 1 Tab by mouth 2 times a day as needed for up to 30 days. Prescription must last 30 days.  Dispense: 60 Tab; Refill: 0    2. Foraminal stenosis of lumbar region    - HYDROcodone-acetaminophen (NORCO) 5-325 MG Tab per tablet; Take 1 Tab by mouth 2 times a day as needed for up to 30 days. Prescription must last 30 days.  Dispense: 60 Tab; Refill: 0    Plan: 1.  Patient is encouraged to reduce tobacco  2.  Renew Norco twice daily  3.  Update hip x-ray  4.  Revisit with me 1 month  5.  Flu vac  "

## 2018-10-29 ENCOUNTER — HOSPITAL ENCOUNTER (OUTPATIENT)
Dept: RADIOLOGY | Facility: MEDICAL CENTER | Age: 73
End: 2018-10-29
Attending: FAMILY MEDICINE
Payer: MEDICARE

## 2018-10-29 DIAGNOSIS — M19.90 ARTHRITIS: ICD-10-CM

## 2018-10-29 PROCEDURE — 73521 X-RAY EXAM HIPS BI 2 VIEWS: CPT

## 2018-10-30 ENCOUNTER — TELEPHONE (OUTPATIENT)
Dept: MEDICAL GROUP | Facility: MEDICAL CENTER | Age: 73
End: 2018-10-30

## 2018-10-30 NOTE — TELEPHONE ENCOUNTER
----- Message from Robert Lindo M.D. sent at 10/29/2018  2:35 PM PDT -----  Hip x-ray shows mild degenerative arthritis changes on hip x-ray.  No fracture.

## 2018-11-26 ENCOUNTER — HOSPITAL ENCOUNTER (OUTPATIENT)
Dept: LAB | Facility: MEDICAL CENTER | Age: 73
End: 2018-11-26
Attending: FAMILY MEDICINE
Payer: MEDICARE

## 2018-11-26 ENCOUNTER — OFFICE VISIT (OUTPATIENT)
Dept: MEDICAL GROUP | Facility: MEDICAL CENTER | Age: 73
End: 2018-11-26
Attending: FAMILY MEDICINE
Payer: MEDICARE

## 2018-11-26 VITALS
RESPIRATION RATE: 16 BRPM | DIASTOLIC BLOOD PRESSURE: 62 MMHG | HEART RATE: 88 BPM | OXYGEN SATURATION: 95 % | HEIGHT: 67 IN | BODY MASS INDEX: 24.96 KG/M2 | WEIGHT: 159 LBS | TEMPERATURE: 97.4 F | SYSTOLIC BLOOD PRESSURE: 112 MMHG

## 2018-11-26 DIAGNOSIS — M19.90 ARTHRITIS: ICD-10-CM

## 2018-11-26 DIAGNOSIS — M25.552 CHRONIC HIP PAIN, BILATERAL: ICD-10-CM

## 2018-11-26 DIAGNOSIS — M25.551 CHRONIC HIP PAIN, BILATERAL: ICD-10-CM

## 2018-11-26 DIAGNOSIS — N18.30 CHRONIC RENAL FAILURE, STAGE 3 (MODERATE) (HCC): ICD-10-CM

## 2018-11-26 DIAGNOSIS — M48.061 FORAMINAL STENOSIS OF LUMBAR REGION: ICD-10-CM

## 2018-11-26 DIAGNOSIS — G89.29 CHRONIC HIP PAIN, BILATERAL: ICD-10-CM

## 2018-11-26 DIAGNOSIS — M48.061 SPINAL STENOSIS OF LUMBAR REGION WITHOUT NEUROGENIC CLAUDICATION: ICD-10-CM

## 2018-11-26 PROCEDURE — 80053 COMPREHEN METABOLIC PANEL: CPT

## 2018-11-26 PROCEDURE — 99213 OFFICE O/P EST LOW 20 MIN: CPT | Performed by: FAMILY MEDICINE

## 2018-11-26 PROCEDURE — 36415 COLL VENOUS BLD VENIPUNCTURE: CPT

## 2018-11-26 RX ORDER — HYDROCODONE BITARTRATE AND ACETAMINOPHEN 5; 325 MG/1; MG/1
1 TABLET ORAL EVERY 8 HOURS PRN
Qty: 90 TAB | Refills: 0 | Status: SHIPPED | OUTPATIENT
Start: 2018-11-26 | End: 2018-12-27 | Stop reason: SDUPTHER

## 2018-11-26 NOTE — PROGRESS NOTES
"Subjective:      Fredy Gonsalez Jr. is a 73 y.o. male who presents with No chief complaint on file.            HPI 1.  Bilateral hip pain-patient reports worsening level of bilateral hip pain now limiting to walking no more than 1 block without having to stop about a 50% reduction in the past 2 months.  Patient reports she is actually been taking the Norco 5 mg 3 times a day rather than 2 times a day due to the increased pain.  He reports pain is worsened since we held his NSAIDs (ibuprofen) back in September due to moderate rise in his serum creatinine at 1.45, estimated GFR 48.  Patient reports that he has been evaluated by neurosurgery approximately 3 years ago and they confirmed mild to moderate lumbar stenosis with mild to moderate foraminal stenosis at L4-5 seen on previous MRI in [facility].  Patient saw orthopedics 1 year ago for bilateral hip pain and they felt that his problem was all in his back and that he did not warrant any discussion of hip surgery.  Most recent plain x-rays of the hip radiographically only documented mild arthritic change.  Ankle-brachial index was normal 2 years ago.  Patient is 1 pack/day smoker    ROS negative for urinary incontinence, fecal incontinence, lower extremity numbness.  Patient does report just general clumsiness in his left leg as compared to his right chronically       Objective:     /62 (BP Location: Left arm, Patient Position: Sitting, BP Cuff Size: Adult)   Pulse 88   Temp 36.3 °C (97.4 °F) (Temporal)   Resp 16   Ht 1.702 m (5' 7.01\")   Wt 72.1 kg (159 lb)   SpO2 95%   BMI 24.90 kg/m²      Physical Exam  General-alert cooperative male in mild distress  Back-1+ tender from about L4 through S4 in the midline paraspinous areas are nontender.  Lower extremities-intact light touch bilaterally.  Intact strength bilaterally.  Intact patellar and Achilles reflexes bilaterally          Assessment/Plan:     1. Spinal stenosis of lumbar region without " neurogenic claudication      2. Chronic hip pain, bilateral      Plan: 1.  Update UDS today  2.  Increase Norco to 5 mg 3 times daily, #90 with revisit in 1 month  3.  Physiatry referral to try and sort out which is his primary pain generator and tailor treatment at that point  4.  Update his serum creatinine

## 2018-11-27 LAB
ALBUMIN SERPL BCP-MCNC: 4 G/DL (ref 3.2–4.9)
ALBUMIN/GLOB SERPL: 1.4 G/DL
ALP SERPL-CCNC: 66 U/L (ref 30–99)
ALT SERPL-CCNC: 15 U/L (ref 2–50)
ANION GAP SERPL CALC-SCNC: 4 MMOL/L (ref 0–11.9)
AST SERPL-CCNC: 15 U/L (ref 12–45)
BILIRUB SERPL-MCNC: 0.3 MG/DL (ref 0.1–1.5)
BUN SERPL-MCNC: 23 MG/DL (ref 8–22)
CALCIUM SERPL-MCNC: 9.1 MG/DL (ref 8.5–10.5)
CHLORIDE SERPL-SCNC: 110 MMOL/L (ref 96–112)
CO2 SERPL-SCNC: 26 MMOL/L (ref 20–33)
CREAT SERPL-MCNC: 1.33 MG/DL (ref 0.5–1.4)
GLOBULIN SER CALC-MCNC: 2.9 G/DL (ref 1.9–3.5)
GLUCOSE SERPL-MCNC: 98 MG/DL (ref 65–99)
POTASSIUM SERPL-SCNC: 4.4 MMOL/L (ref 3.6–5.5)
PROT SERPL-MCNC: 6.9 G/DL (ref 6–8.2)
SODIUM SERPL-SCNC: 140 MMOL/L (ref 135–145)

## 2018-12-03 ENCOUNTER — TELEPHONE (OUTPATIENT)
Dept: MEDICAL GROUP | Facility: MEDICAL CENTER | Age: 73
End: 2018-12-03

## 2018-12-03 NOTE — TELEPHONE ENCOUNTER
1. Caller Name: Fredy Gonsalez Jr.                                           Call Back Number: [phone] (home)         Patient approves a detailed voicemail message: yes     could not leave a VM to Call us back to go over results.

## 2018-12-03 NOTE — TELEPHONE ENCOUNTER
----- Message from Robert Lindo M.D. sent at 11/30/2018  8:45 PM PST -----  Current labs show excellent kidney function with improved lowered creatinine from 2 months ago.  Kidney function is still slightly low which is probably consistent with his overall age.  Recommend continue generous fluids and repeat chemistry panel in 3-6 months

## 2018-12-04 NOTE — TELEPHONE ENCOUNTER
Phone Number Called: [phone] (home)     Message: Pt notified of providers result notes.    Left Message for patient to call back: yes

## 2018-12-19 ENCOUNTER — TELEPHONE (OUTPATIENT)
Dept: MEDICAL GROUP | Facility: MEDICAL CENTER | Age: 73
End: 2018-12-19

## 2018-12-19 NOTE — TELEPHONE ENCOUNTER
1. Name: TOSHIA VERGARA      Call Back Number: [phone]  Patient approves a detailed voicemail message: yes    2. Which medication(s) is being requested? NORCO    3. What is the preferred Pharmacy? [facility]    Patient was informed they may receive a return phone call from our office with any additional questions before processing this request.    **He had an appt scheduled on 12/27 but it needed to be rescheduled due to [place] out of the office. I couldn't get him in until Mon, Jan 7th. He stated he will run out of his medication on Dec 30th.    Please call him when Rx is ready for .

## 2018-12-27 DIAGNOSIS — M48.061 FORAMINAL STENOSIS OF LUMBAR REGION: ICD-10-CM

## 2018-12-27 DIAGNOSIS — M19.90 ARTHRITIS: ICD-10-CM

## 2018-12-27 RX ORDER — HYDROCODONE BITARTRATE AND ACETAMINOPHEN 5; 325 MG/1; MG/1
1 TABLET ORAL EVERY 8 HOURS PRN
Qty: 90 TAB | Refills: 0 | Status: SHIPPED | OUTPATIENT
Start: 2018-12-27 | End: 2019-01-28 | Stop reason: SDUPTHER

## 2019-01-07 ENCOUNTER — OFFICE VISIT (OUTPATIENT)
Dept: MEDICAL GROUP | Facility: MEDICAL CENTER | Age: 74
End: 2019-01-07
Attending: FAMILY MEDICINE
Payer: MEDICARE

## 2019-01-07 VITALS
DIASTOLIC BLOOD PRESSURE: 60 MMHG | RESPIRATION RATE: 16 BRPM | HEART RATE: 72 BPM | WEIGHT: 157 LBS | HEIGHT: 67 IN | SYSTOLIC BLOOD PRESSURE: 122 MMHG | TEMPERATURE: 97.9 F | OXYGEN SATURATION: 97 % | BODY MASS INDEX: 24.64 KG/M2

## 2019-01-07 DIAGNOSIS — M25.552 BILATERAL HIP PAIN: ICD-10-CM

## 2019-01-07 DIAGNOSIS — M25.551 BILATERAL HIP PAIN: ICD-10-CM

## 2019-01-07 DIAGNOSIS — G89.29 CHRONIC BILATERAL LOW BACK PAIN WITH LEFT-SIDED SCIATICA: ICD-10-CM

## 2019-01-07 DIAGNOSIS — F11.20 OPIOID TYPE DEPENDENCE, CONTINUOUS (HCC): ICD-10-CM

## 2019-01-07 DIAGNOSIS — M54.42 CHRONIC BILATERAL LOW BACK PAIN WITH LEFT-SIDED SCIATICA: ICD-10-CM

## 2019-01-07 DIAGNOSIS — M48.07 SPINAL STENOSIS OF LUMBOSACRAL REGION: ICD-10-CM

## 2019-01-07 PROCEDURE — 99213 OFFICE O/P EST LOW 20 MIN: CPT | Performed by: FAMILY MEDICINE

## 2019-01-07 NOTE — PROGRESS NOTES
"Subjective:      Fredy Gonsalez Jr. is a 73 y.o. male who presents with No chief complaint on file.            HPI 1.  Chronic lumbar pain with left-sided leg weakness-patient reports that his low back pain rapidly escalates as he starts to walk.  He is limited in walking not more than 1-2 blocks.  Also notes bilateral hip pain.  Reports the situation is steadily worsening.  He gets partial short-term relief with the use of Norco 5/325 at 3 doses per day.  He was evaluated on May 1, 2015 by Dr. Busch who was at [facility] neurosurgery.  She had ordered an MRI on him at that time and referred him for lumbar injections.  He did get the lumbar injections which did not create any improvement.  Lumbar MRI at that time was notable for possible moderate spinal canal stenosis and moderate bilateral neuroforaminal stenosis at L4-5.  2.  Opioid dependence-patient continues to utilize Norco 5/325 3 times daily for partial pain relief.  Not reporting any constipation, confusion, lost prescriptions, early prescription refills or use of any additional opioids from other sources.  ROS negative for urinary incontinence, fecal incontinence, constipation       Objective:     /60 (BP Location: Left arm, Patient Position: Sitting, BP Cuff Size: Adult)   Pulse 72   Temp 36.6 °C (97.9 °F)   Resp 16   Ht 1.702 m (5' 7.01\")   Wt 71.2 kg (157 lb)   SpO2 97%   BMI 24.58 kg/m²      Physical Exam  General-alert cooperative male in mild distress  Back-mildly tender in the midline from L4 through S3, paraspinous areas are nontender while sitting  Lower extremities-intact light touch.  Intact strength.  Hip flexion is 85-90 degrees mainly limited by hip muscle tightness not pain.  Limited internal and external rotation is not painful bilaterally.          Assessment/Plan:     1. Chronic bilateral low back pain with left-sided sciatica      2. Bilateral hip pain    3.  Opioid dependence-stable appropriate use    Plan: 1.  Update " lumbar MRI to assess degree of stenosis  2.  Continue limited use of Norco  3.  Patient [place] that far so he did not see Dr. Madrigal.

## 2019-01-14 ENCOUNTER — HOSPITAL ENCOUNTER (OUTPATIENT)
Dept: RADIOLOGY | Facility: MEDICAL CENTER | Age: 74
End: 2019-01-14
Attending: FAMILY MEDICINE
Payer: MEDICARE

## 2019-01-14 DIAGNOSIS — M48.07 SPINAL STENOSIS OF LUMBOSACRAL REGION: ICD-10-CM

## 2019-01-14 PROCEDURE — 72148 MRI LUMBAR SPINE W/O DYE: CPT

## 2019-01-16 ENCOUNTER — TELEPHONE (OUTPATIENT)
Dept: MEDICAL GROUP | Facility: MEDICAL CENTER | Age: 74
End: 2019-01-16

## 2019-01-16 NOTE — TELEPHONE ENCOUNTER
Phone Number Called: [phone] (home)     Message:  Left VM To give us a call back to go over Results    Left Message for patient to call back: yes

## 2019-01-16 NOTE — TELEPHONE ENCOUNTER
----- Message from Robert Lindo M.D. sent at 1/15/2019  6:26 PM PST -----  New lumbar MRI shows moderate narrowing of the central spinal canal at L4-5 with moderate narrowing of the left exiting spinal nerve root passageway.  This sounds little changed from the 2015 study other than there is no narrowing on the right side at that level.  Other levels all look good.  Would persist with current therapy plan.

## 2019-01-28 ENCOUNTER — HOSPITAL ENCOUNTER (OUTPATIENT)
Dept: LAB | Facility: MEDICAL CENTER | Age: 74
End: 2019-01-28
Attending: FAMILY MEDICINE
Payer: MEDICARE

## 2019-01-28 ENCOUNTER — OFFICE VISIT (OUTPATIENT)
Dept: MEDICAL GROUP | Facility: MEDICAL CENTER | Age: 74
End: 2019-01-28
Attending: FAMILY MEDICINE
Payer: MEDICARE

## 2019-01-28 VITALS
BODY MASS INDEX: 26.21 KG/M2 | HEIGHT: 67 IN | TEMPERATURE: 97.6 F | OXYGEN SATURATION: 97 % | WEIGHT: 167 LBS | RESPIRATION RATE: 16 BRPM | HEART RATE: 76 BPM | DIASTOLIC BLOOD PRESSURE: 52 MMHG | SYSTOLIC BLOOD PRESSURE: 110 MMHG

## 2019-01-28 DIAGNOSIS — I70.213 INTERMITTENT CLAUDICATION OF BOTH LOWER EXTREMITIES DUE TO ATHEROSCLEROSIS (HCC): ICD-10-CM

## 2019-01-28 DIAGNOSIS — M19.90 ARTHRITIS: ICD-10-CM

## 2019-01-28 DIAGNOSIS — J43.1 PANLOBULAR EMPHYSEMA (HCC): ICD-10-CM

## 2019-01-28 DIAGNOSIS — M48.061 FORAMINAL STENOSIS OF LUMBAR REGION: ICD-10-CM

## 2019-01-28 LAB
ALBUMIN SERPL BCP-MCNC: 4.2 G/DL (ref 3.2–4.9)
ALBUMIN/GLOB SERPL: 1.5 G/DL
ALP SERPL-CCNC: 64 U/L (ref 30–99)
ALT SERPL-CCNC: 13 U/L (ref 2–50)
ANION GAP SERPL CALC-SCNC: 5 MMOL/L (ref 0–11.9)
AST SERPL-CCNC: 14 U/L (ref 12–45)
BASOPHILS # BLD AUTO: 0.4 % (ref 0–1.8)
BASOPHILS # BLD: 0.03 K/UL (ref 0–0.12)
BILIRUB SERPL-MCNC: 0.5 MG/DL (ref 0.1–1.5)
BUN SERPL-MCNC: 25 MG/DL (ref 8–22)
CALCIUM SERPL-MCNC: 9.5 MG/DL (ref 8.5–10.5)
CHLORIDE SERPL-SCNC: 108 MMOL/L (ref 96–112)
CO2 SERPL-SCNC: 27 MMOL/L (ref 20–33)
CREAT SERPL-MCNC: 1.47 MG/DL (ref 0.5–1.4)
EOSINOPHIL # BLD AUTO: 0.15 K/UL (ref 0–0.51)
EOSINOPHIL NFR BLD: 1.9 % (ref 0–6.9)
ERYTHROCYTE [DISTWIDTH] IN BLOOD BY AUTOMATED COUNT: 52.8 FL (ref 35.9–50)
FASTING STATUS PATIENT QL REPORTED: NORMAL
GLOBULIN SER CALC-MCNC: 2.8 G/DL (ref 1.9–3.5)
GLUCOSE SERPL-MCNC: 94 MG/DL (ref 65–99)
HCT VFR BLD AUTO: 45.8 % (ref 42–52)
HGB BLD-MCNC: 14.7 G/DL (ref 14–18)
IMM GRANULOCYTES # BLD AUTO: 0.03 K/UL (ref 0–0.11)
IMM GRANULOCYTES NFR BLD AUTO: 0.4 % (ref 0–0.9)
LYMPHOCYTES # BLD AUTO: 1.38 K/UL (ref 1–4.8)
LYMPHOCYTES NFR BLD: 17.9 % (ref 22–41)
MCH RBC QN AUTO: 34.3 PG (ref 27–33)
MCHC RBC AUTO-ENTMCNC: 32.1 G/DL (ref 33.7–35.3)
MCV RBC AUTO: 106.8 FL (ref 81.4–97.8)
MONOCYTES # BLD AUTO: 0.63 K/UL (ref 0–0.85)
MONOCYTES NFR BLD AUTO: 8.2 % (ref 0–13.4)
NEUTROPHILS # BLD AUTO: 5.51 K/UL (ref 1.82–7.42)
NEUTROPHILS NFR BLD: 71.2 % (ref 44–72)
NRBC # BLD AUTO: 0 K/UL
NRBC BLD-RTO: 0 /100 WBC
PLATELET # BLD AUTO: 224 K/UL (ref 164–446)
PMV BLD AUTO: 9.9 FL (ref 9–12.9)
POTASSIUM SERPL-SCNC: 4.4 MMOL/L (ref 3.6–5.5)
PROT SERPL-MCNC: 7 G/DL (ref 6–8.2)
RBC # BLD AUTO: 4.29 M/UL (ref 4.7–6.1)
SODIUM SERPL-SCNC: 140 MMOL/L (ref 135–145)
WBC # BLD AUTO: 7.7 K/UL (ref 4.8–10.8)

## 2019-01-28 PROCEDURE — 36415 COLL VENOUS BLD VENIPUNCTURE: CPT

## 2019-01-28 PROCEDURE — 99213 OFFICE O/P EST LOW 20 MIN: CPT | Performed by: FAMILY MEDICINE

## 2019-01-28 PROCEDURE — 85025 COMPLETE CBC W/AUTO DIFF WBC: CPT

## 2019-01-28 PROCEDURE — 80053 COMPREHEN METABOLIC PANEL: CPT

## 2019-01-28 RX ORDER — HYDROCODONE BITARTRATE AND ACETAMINOPHEN 5; 325 MG/1; MG/1
1 TABLET ORAL EVERY 8 HOURS PRN
Qty: 90 TAB | Refills: 0 | Status: SHIPPED | OUTPATIENT
Start: 2019-01-28 | End: 2019-02-28 | Stop reason: SDUPTHER

## 2019-01-28 NOTE — PROGRESS NOTES
"Subjective:      Fredy Gonsalez Jr. is a 73 y.o. male who presents with Follow-Up            HPI 1.  Bilateral lower extremity pain-patient continues to report increasing pain at shorter distances walking.  He will get bilateral very proximal thigh lateral hip pain with walking 1-1/2-2 blocks.  He reports that he has to stop and wait for about 5-10 minutes and then can walk and other similar distance.  When he is just walking around inside his apartment he does not have this pain.  Previous arterial ultrasound in 2016 was notable for numerous plaque but no significant stenotic segments.  Patient does continue to smoke at least one pack of cigarettes per day in the interim.  Previous bilateral hip x-rays just showed mild osteoarthritic change in October 2018  2.  Emphysema-patient denies daily dyspnea with exertion.  Does note a brief morning cough with a small amount of clear sputum.  No hemoptysis.  Because the cost brittney from $8 per inhaler up to $24 patient has not used Symbicort, Spiriva or Ventolin rescue inhaler for over 1 year.    ROS negative for extremity edema, cyanosis, skin breakdown       Objective:     /52 (BP Location: Left arm, Patient Position: Sitting, BP Cuff Size: Adult)   Pulse 76   Temp 36.4 °C (97.6 °F) (Temporal)   Resp 16   Ht 1.702 m (5' 7.01\")   Wt 75.8 kg (167 lb)   SpO2 97%   BMI 26.15 kg/m²      Physical Exam  Gen.- alert, cooperative, in no acute distress  Neck- midline trachea, thyroid not enlarged or tender,supple, no cervical adenopathy  Chest-clear to auscultation and percussion with normal breath sounds. No retractions. Chest wall nontender  Cardiac- regular rhythm and rate. No murmur, thrill, or heave     Imaging-lumbar MRI was updated and it is little changed from 2 years ago.  There is moderate lumbar central canal stenosis at L4-5 and moderate left-sided neuroforaminal stenosis at that same level otherwise no significant narrowing       Assessment/Plan: "     1. Arthritis    - HYDROcodone-acetaminophen (NORCO) 5-325 MG Tab per tablet; Take 1 Tab by mouth every 8 hours as needed for up to 30 days. Prescription must last 30 days.  Dispense: 90 Tab; Refill: 0    2. Foraminal stenosis of lumbar region    - HYDROcodone-acetaminophen (NORCO) 5-325 MG Tab per tablet; Take 1 Tab by mouth every 8 hours as needed for up to 30 days. Prescription must last 30 days.  Dispense: 90 Tab; Refill: 0    3. Intermittent claudication of both lower extremities due to atherosclerosis (HCC)    - US-EXTREMITY ARTERY LOWER BILAT W/ALEX (COMBO); Future    4. Panlobular emphysema (HCC)    Plan: 1.  Renew Norco 5/325, 3 times daily  2.  Update ankle-brachial index with possible arterial ultrasound  3.  Patient is encouraged to reduce tobacco-he reports that he has failed 2 trials of nicotine patches along with trial of Chantix and is not very optimistic that he will reduce his cigarettes.  4.  Revisit with me in 1 month  5.  Update CBC, CMP today

## 2019-01-29 ENCOUNTER — TELEPHONE (OUTPATIENT)
Dept: MEDICAL GROUP | Facility: MEDICAL CENTER | Age: 74
End: 2019-01-29

## 2019-01-29 DIAGNOSIS — D75.89 MACROCYTOSIS WITHOUT ANEMIA: ICD-10-CM

## 2019-02-28 ENCOUNTER — OFFICE VISIT (OUTPATIENT)
Dept: MEDICAL GROUP | Facility: MEDICAL CENTER | Age: 74
End: 2019-02-28
Attending: FAMILY MEDICINE
Payer: MEDICARE

## 2019-02-28 VITALS
HEART RATE: 102 BPM | HEIGHT: 67 IN | DIASTOLIC BLOOD PRESSURE: 60 MMHG | WEIGHT: 167 LBS | OXYGEN SATURATION: 96 % | SYSTOLIC BLOOD PRESSURE: 115 MMHG | BODY MASS INDEX: 26.21 KG/M2 | TEMPERATURE: 98.3 F | RESPIRATION RATE: 16 BRPM

## 2019-02-28 DIAGNOSIS — M19.90 ARTHRITIS: ICD-10-CM

## 2019-02-28 DIAGNOSIS — M48.061 FORAMINAL STENOSIS OF LUMBAR REGION: ICD-10-CM

## 2019-02-28 DIAGNOSIS — D75.89 MACROCYTOSIS: ICD-10-CM

## 2019-02-28 DIAGNOSIS — J43.1 PANLOBULAR EMPHYSEMA (HCC): ICD-10-CM

## 2019-02-28 DIAGNOSIS — N18.30 CHRONIC RENAL FAILURE SYNDROME, STAGE 3 (MODERATE) (HCC): ICD-10-CM

## 2019-02-28 PROCEDURE — 99213 OFFICE O/P EST LOW 20 MIN: CPT | Performed by: FAMILY MEDICINE

## 2019-02-28 RX ORDER — HYDROCODONE BITARTRATE AND ACETAMINOPHEN 5; 325 MG/1; MG/1
1 TABLET ORAL EVERY 8 HOURS PRN
Qty: 90 TAB | Refills: 0 | Status: SHIPPED | OUTPATIENT
Start: 2019-03-28 | End: 2019-04-27

## 2019-02-28 RX ORDER — HYDROCODONE BITARTRATE AND ACETAMINOPHEN 5; 325 MG/1; MG/1
1 TABLET ORAL EVERY 8 HOURS PRN
Qty: 90 TAB | Refills: 0 | Status: SHIPPED | OUTPATIENT
Start: 2019-02-28 | End: 2019-02-28 | Stop reason: SDUPTHER

## 2019-02-28 NOTE — PROGRESS NOTES
"Subjective:      Fredy Gonsalez Jr. is a 73 y.o. male who presents with No chief complaint on file.            HPI 1.  Chronic back pain-patient reports continued increasing level of low back pain.  Pain will hurt whether he is up walking or at times lying down or sleeping.  Pain radiates only down to either hip.  Not reporting any leg numbness or focal leg weakness.  Separately he also will develop some pain while walking with a feeling of some numbness and pain in his distal left thigh.  He is smoking 1 pack of cigarettes per day.  He did not do his recent ALEX test for unclear reasons.  2.  Elevated serum creatinine-recent creatinine has risen to 1.47 giving him a estimated GFR of 47 cc/min.  Patient is not reporting any unusual left ankle edema, flank pain, hematuria.    ROS negative for abdominal pain, near syncope, chest pain       Objective:     /60 (BP Location: Left arm, Patient Position: Sitting, BP Cuff Size: Adult)   Pulse (!) 102   Temp 36.8 °C (98.3 °F) (Temporal)   Resp 16   Ht 1.702 m (5' 7.01\")   Wt 75.8 kg (167 lb)   SpO2 96%   BMI 26.15 kg/m²      Physical Exam  Gen.- alert, cooperative, in no acute distress  Neck- midline trachea, thyroid not enlarged or tender,supple, no cervical adenopathy  Chest-clear to auscultation and percussion with normal breath sounds. No retractions. Chest wall nontender  Cardiac- regular rhythm and rate. No murmur, thrill, or heave  Lower extremities-intact light touch, intact strength, patellar and Achilles reflexes 2+ bilaterally          Assessment/Plan:     1. Arthritis    - HYDROcodone-acetaminophen (NORCO) 5-325 MG Tab per tablet; Take 1 Tab by mouth every 8 hours as needed for up to 30 days. Prescription must last 30 days.  Dispense: 90 Tab; Refill: 0    2. Foraminal stenosis of lumbar region    - HYDROcodone-acetaminophen (NORCO) 5-325 MG Tab per tablet; Take 1 Tab by mouth every 8 hours as needed for up to 30 days. Prescription must last 30 " days.  Dispense: 90 Tab; Refill: 0    3. Chronic renal failure syndrome, stage 3 (moderate) (HCC)    - Basic Metabolic Panel; Future    4. Macrocytosis    - VITAMIN B12; Future  - FOLATE; Future    Plan: 1.  Patient is encouraged to reduce tobacco  2.  Update BMP along with serum B12 and folate levels shortly before next visit, 6 weeks  3.  Renew Norco 5/325 3 times daily, 2 months prescriptions written today

## 2019-03-11 ENCOUNTER — OFFICE VISIT (OUTPATIENT)
Dept: PULMONOLOGY | Facility: HOSPICE | Age: 74
End: 2019-03-11
Payer: MEDICARE

## 2019-03-11 VITALS
HEART RATE: 74 BPM | OXYGEN SATURATION: 96 % | HEIGHT: 67 IN | SYSTOLIC BLOOD PRESSURE: 114 MMHG | WEIGHT: 164 LBS | RESPIRATION RATE: 16 BRPM | DIASTOLIC BLOOD PRESSURE: 70 MMHG | BODY MASS INDEX: 25.74 KG/M2 | TEMPERATURE: 99.3 F

## 2019-03-11 DIAGNOSIS — G47.33 OSA (OBSTRUCTIVE SLEEP APNEA): ICD-10-CM

## 2019-03-11 DIAGNOSIS — I25.10 CORONARY ARTERY DISEASE INVOLVING NATIVE CORONARY ARTERY OF NATIVE HEART WITHOUT ANGINA PECTORIS: ICD-10-CM

## 2019-03-11 DIAGNOSIS — Z72.0 TOBACCO ABUSE: ICD-10-CM

## 2019-03-11 DIAGNOSIS — J43.1 PANLOBULAR EMPHYSEMA (HCC): ICD-10-CM

## 2019-03-11 DIAGNOSIS — J84.10 PULMONARY FIBROSIS (HCC): ICD-10-CM

## 2019-03-11 PROCEDURE — 94761 N-INVAS EAR/PLS OXIMETRY MLT: CPT | Performed by: NURSE PRACTITIONER

## 2019-03-11 PROCEDURE — 99214 OFFICE O/P EST MOD 30 MIN: CPT | Performed by: NURSE PRACTITIONER

## 2019-03-11 RX ORDER — BUDESONIDE AND FORMOTEROL FUMARATE DIHYDRATE 160; 4.5 UG/1; UG/1
2 AEROSOL RESPIRATORY (INHALATION) 2 TIMES DAILY
Qty: 2 INHALER | Refills: 0 | Status: SHIPPED | OUTPATIENT
Start: 2019-03-11 | End: 2019-10-08

## 2019-03-11 ASSESSMENT — ENCOUNTER SYMPTOMS
NEUROLOGICAL NEGATIVE: 1
HEMOPTYSIS: 0
EYE PAIN: 0
PSYCHIATRIC NEGATIVE: 1
SHORTNESS OF BREATH: 1
CONSTITUTIONAL NEGATIVE: 1
BACK PAIN: 1
GASTROINTESTINAL NEGATIVE: 1
WHEEZING: 1
CARDIOVASCULAR NEGATIVE: 1
BRUISES/BLEEDS EASILY: 0
COUGH: 1
SPUTUM PRODUCTION: 0
MYALGIAS: 1
EYE DISCHARGE: 0
NECK PAIN: 0
FALLS: 0

## 2019-03-11 NOTE — PROGRESS NOTES
"Chief Complaint   Patient presents with   • COPD     Last Seen 07/06/17         HPI: This patient is a 73 y.o. male, who presents for overdue annual follow-up of COPD/emphysema, AMIRA.  He has a history of pulmonary nodule stable times 2 years.  He is a current smoker, 30 + pk year hx.      In regards to COPD, former PFTs show mild changes with an FEV1 of 2.64 L 86% predicted. His PCP obtained chest CT in September 2018 which indicates Patchy nonspecific pulmonary fibrotic changes again seen and similar to prior exam (2016) with emphysematous changes also noted. No suspicious pulmonary nodule or mass.  Stable left adrenal gland.  Probable bilateral kidney cyst.  Fredy has tried multiple interventions for smoking cessation including Chantix, hypnosis, nicotine patches and gum without benefit.  He is not interested in smoking cessation.  Patient was previously compliant with Symbicort 160/4.5, 2 puffs, twice a day, Spiriva Respimat 2 actuations daily but discontinued due to cough.  He has albuterol which he will use on occasion.  He reports exertional dyspnea with ADLs.  Reports dry cough and occasional wheeze.  Denies URI    Since his last visit, he apparently had \"heart attack\" while visiting a friend in [place].  He had 3 cardiac stents placed and has evidence of CABG, details are unclear.  Records have been requested from [facility] by his cardiologist Dr. Tong.     He has a history of AMIRA intolerant to CPAP.  He was using nocturnal oxygen however after his hospitalization and subsequent rehabilitation in [facility] he was unable to pay his rent here in [place] and was evicted.  He lost his oxygen at that time.  Multiple oximetry on room air today is normal.  Will check overnight oximetry.    Past Medical History:   Diagnosis Date   • Arthritis    • COPD    • EMPHYSEMA    • Heart attack (HCC) 12/02/2017    received 3 stents, [facility]   • Hyperlipidemia 4/17/2012       Social History "   Substance Use Topics   • Smoking status: Current Every Day Smoker     Packs/day: 1.00     Years: 60.00     Types: Cigarettes     Start date: 6/15/1957   • Smokeless tobacco: Former User     Types: Chew   • Alcohol use No      Comment: quit 28yrs ago       Family History   Problem Relation Age of Onset   • Arthritis Mother    • Lung Disease Mother    • Osteoporosis Mother    • Alzheimer's Disease Mother    • Other Mother         parkinsons   • Kidney Disease Father    • Cancer Neg Hx    • Diabetes Neg Hx    • Heart Disease Neg Hx    • Stroke Neg Hx        Immunization History   Administered Date(s) Administered   • Hep A/HEP B Combined Vaccine (TwinRix) 07/06/2006   • Influenza Seasonal Injectable 10/24/2013, 11/01/2014   • Influenza Vaccine Adult HD 10/25/2018   • Influenza Vaccine Quad Inj (Preserved) 11/02/2015, 10/20/2016   • Pneumococcal Conjugate Vaccine (Prevnar/PCV-13) 09/28/2015   • Pneumococcal polysaccharide vaccine (PPSV-23) 12/18/2013   • Tdap Vaccine 07/06/2006, 12/30/2015       Current medications as of today   Current Outpatient Prescriptions   Medication Sig Dispense Refill   • budesonide-formoterol (SYMBICORT) 160-4.5 MCG/ACT Aerosol Inhale 2 Puffs by mouth 2 Times a Day. 2 Inhaler 0   • Tiotropium Bromide Monohydrate (SPIRIVA RESPIMAT) 2.5 MCG/ACT Aero Soln Inhale 2 Inhalation by mouth every day. Assemble and prime. 2 Inhaler 0   • [START ON 3/28/2019] HYDROcodone-acetaminophen (NORCO) 5-325 MG Tab per tablet Take 1 Tab by mouth every 8 hours as needed for up to 30 days. Prescription must last 30 days. 90 Tab 0   • nitroglycerin (NITROSTAT) 0.4 MG SL Tab ONE TABLET UNDER TONGUE AS NEEDED FOR CHEST PAIN 25 Tab 0   • aspirin EC (ECOTRIN) 81 MG Tablet Delayed Response Take 81 mg by mouth every day.     • atorvastatin (LIPITOR) 40 MG Tab Take 1 Tab by mouth every day. 30 Tab 11   • clopidogrel (PLAVIX) 75 MG Tab Take 1 Tab by mouth every day. 30 Tab 11   • lisinopril (PRINIVIL) 5 MG Tab Take 1 Tab by  "mouth every day. 30 Tab 11   • metoprolol SR (TOPROL XL) 50 MG TABLET SR 24 HR Take 1 Tab by mouth every day. 30 Tab 11   • albuterol 108 (90 BASE) MCG/ACT Aero Soln inhalation aerosol Inhale 2 Puffs by mouth every 6 hours as needed for Shortness of Breath. 8.5 g 11   • albuterol (PROAIR HFA) 108 (90 BASE) MCG/ACT Aero Soln inhalation aerosol Inhale 2 Puffs by mouth every 6 hours as needed. 8.5 g 3     No current facility-administered medications for this visit.        Allergies: Chantix [varenicline] and Fish    Blood pressure 114/70, pulse 74, temperature 37.4 °C (99.3 °F), temperature source Temporal, resp. rate 16, height 1.702 m (5' 7\"), weight 74.4 kg (164 lb), SpO2 96 %.      Review of Systems   Constitutional: Negative.    HENT: Negative.    Eyes: Negative for pain and discharge.   Respiratory: Positive for cough, shortness of breath and wheezing. Negative for hemoptysis and sputum production.    Cardiovascular: Negative.    Gastrointestinal: Negative.    Musculoskeletal: Positive for back pain and myalgias. Negative for falls, joint pain and neck pain.   Skin: Negative.    Neurological: Negative.    Endo/Heme/Allergies: Negative for environmental allergies. Does not bruise/bleed easily.   Psychiatric/Behavioral: Negative.        Physical Exam   Constitutional: He is oriented to person, place, and time and well-developed, well-nourished, and in no distress.   HENT:   Head: Normocephalic and atraumatic.   Poor dentition, no evidence of thrush, oropharynx moist   Eyes: Pupils are equal, round, and reactive to light.   Neck: Normal range of motion. Neck supple. No tracheal deviation present.   Cardiovascular: Normal rate, regular rhythm and normal heart sounds.    Pulmonary/Chest: Breath sounds normal. No respiratory distress.   Diminished breath sounds throughout, faint expiratory wheeze   Musculoskeletal: Normal range of motion. He exhibits no edema.   Neurological: He is alert and oriented to person, place, " and time.   Skin: Skin is warm and dry.   Psychiatric: Mood, memory, affect and judgment normal.   Vitals reviewed.      Diagnoses/Plan:    1. Panlobular emphysema (HCC)  I reviewed with the patient the purpose of maintenance versus rescue medications.  Will get him samples through sample pharmacy.  I like him to restart Symbicort and Spiriva.  He requires updated pulmonary function testing.  Overnight oximetry to verify nocturnal saturations.  If evidence of desaturations will start him on nocturnal oxygen.  - PULMONARY FUNCTION TESTS -Test requested: Complete Pulmonary Function Test; Future  - Multiple Oximetry; Future  - budesonide-formoterol (SYMBICORT) 160-4.5 MCG/ACT Aerosol; Inhale 2 Puffs by mouth 2 Times a Day.  Dispense: 2 Inhaler; Refill: 0  - Tiotropium Bromide Monohydrate (SPIRIVA RESPIMAT) 2.5 MCG/ACT Aero Soln; Inhale 2 Inhalation by mouth every day. Assemble and prime.  Dispense: 2 Inhaler; Refill: 0  - Overnight Oximetry; Future  - Multiple Oximetry    2. AMIRA (obstructive sleep apnea)  Intolerant to CPAP    3. Tobacco abuse  Permanent cessation recommended.  Patient is not ready to quit smoking.  He understands associated health hazards    4. Pulmonary fibrosis (HCC)  Fibrotic changes evidenced on CT stable from 2016, I suspect related to emphysema and smoking.  I do not believe he has ILD.  We will recheck PFTs to ensure no restrictive changes.  - PULMONARY FUNCTION TESTS -Test requested: Complete Pulmonary Function Test; Future  - Multiple Oximetry; Future  - Multiple Oximetry    5. Coronary artery disease involving native coronary artery of native heart without angina pectoris  Stable, currently asymptomatic follows with cardiology      I would like him to follow-up here in 1 month to review PFTs and overnight oximetry            This dictation was created using voice recognition software. The accuracy of the dictation is limited to the abilities of the software. I expect there may be some errors  of grammar and possibly content.

## 2019-03-11 NOTE — PROCEDURES
Multi-Ox Readings  Multi Ox #1 Room air   O2 sat % at rest 96   O2 sat % on exertion 95   O2 sat average on exertion     Multi Ox #2     O2 sat % at rest     O2 sat % on exertion     O2 sat average on exertion       Oxygen Use 2 (Per patient D/C in Sept 2017)   Oxygen Frequency Nocturnal   Duration of need     Is the patient mobile within the home?     CPAP Use?     BIPAP Use?     Servo Titration

## 2019-03-25 ENCOUNTER — HOSPITAL ENCOUNTER (OUTPATIENT)
Dept: LAB | Facility: MEDICAL CENTER | Age: 74
End: 2019-03-25
Attending: FAMILY MEDICINE
Payer: MEDICARE

## 2019-03-25 DIAGNOSIS — D75.89 MACROCYTOSIS: ICD-10-CM

## 2019-03-25 DIAGNOSIS — N18.30 CHRONIC RENAL FAILURE SYNDROME, STAGE 3 (MODERATE) (HCC): ICD-10-CM

## 2019-03-25 LAB
ANION GAP SERPL CALC-SCNC: 6 MMOL/L (ref 0–11.9)
BUN SERPL-MCNC: 18 MG/DL (ref 8–22)
CALCIUM SERPL-MCNC: 8.9 MG/DL (ref 8.5–10.5)
CHLORIDE SERPL-SCNC: 106 MMOL/L (ref 96–112)
CO2 SERPL-SCNC: 27 MMOL/L (ref 20–33)
CREAT SERPL-MCNC: 1.51 MG/DL (ref 0.5–1.4)
FOLATE SERPL-MCNC: 10.3 NG/ML
GLUCOSE SERPL-MCNC: 80 MG/DL (ref 65–99)
POTASSIUM SERPL-SCNC: 3.9 MMOL/L (ref 3.6–5.5)
SODIUM SERPL-SCNC: 139 MMOL/L (ref 135–145)
VIT B12 SERPL-MCNC: 698 PG/ML (ref 211–911)

## 2019-03-25 PROCEDURE — 36415 COLL VENOUS BLD VENIPUNCTURE: CPT

## 2019-03-25 PROCEDURE — 82607 VITAMIN B-12: CPT

## 2019-03-25 PROCEDURE — 82746 ASSAY OF FOLIC ACID SERUM: CPT

## 2019-03-25 PROCEDURE — 80048 BASIC METABOLIC PNL TOTAL CA: CPT

## 2019-04-10 DIAGNOSIS — J43.1 PANLOBULAR EMPHYSEMA (HCC): ICD-10-CM

## 2019-04-11 ENCOUNTER — HOME STUDY (OUTPATIENT)
Dept: PULMONOLOGY | Facility: HOSPICE | Age: 74
End: 2019-04-11
Attending: NURSE PRACTITIONER
Payer: MEDICARE

## 2019-04-11 ENCOUNTER — OFFICE VISIT (OUTPATIENT)
Dept: CARDIOLOGY | Facility: MEDICAL CENTER | Age: 74
End: 2019-04-11
Payer: MEDICARE

## 2019-04-11 VITALS
OXYGEN SATURATION: 96 % | SYSTOLIC BLOOD PRESSURE: 110 MMHG | DIASTOLIC BLOOD PRESSURE: 70 MMHG | BODY MASS INDEX: 25.58 KG/M2 | WEIGHT: 163 LBS | HEART RATE: 92 BPM | HEIGHT: 67 IN

## 2019-04-11 DIAGNOSIS — I25.2 HISTORY OF MYOCARDIAL INFARCTION: ICD-10-CM

## 2019-04-11 DIAGNOSIS — Z95.5 S/P CORONARY ARTERY STENT PLACEMENT: ICD-10-CM

## 2019-04-11 DIAGNOSIS — I21.11 ST ELEVATION MYOCARDIAL INFARCTION INVOLVING RIGHT CORONARY ARTERY (HCC): ICD-10-CM

## 2019-04-11 DIAGNOSIS — I25.10 CORONARY ARTERY DISEASE, OCCLUSIVE: ICD-10-CM

## 2019-04-11 DIAGNOSIS — I73.9 PVD (PERIPHERAL VASCULAR DISEASE) (HCC): ICD-10-CM

## 2019-04-11 DIAGNOSIS — Z95.1 S/P CABG (CORONARY ARTERY BYPASS GRAFT): ICD-10-CM

## 2019-04-11 DIAGNOSIS — E78.5 DYSLIPIDEMIA: ICD-10-CM

## 2019-04-11 DIAGNOSIS — R07.89 ATYPICAL CHEST PAIN: ICD-10-CM

## 2019-04-11 DIAGNOSIS — Z72.0 TOBACCO ABUSE: ICD-10-CM

## 2019-04-11 PROCEDURE — 99214 OFFICE O/P EST MOD 30 MIN: CPT | Performed by: INTERNAL MEDICINE

## 2019-04-11 PROCEDURE — 94762 N-INVAS EAR/PLS OXIMTRY CONT: CPT | Performed by: INTERNAL MEDICINE

## 2019-04-11 ASSESSMENT — ENCOUNTER SYMPTOMS
COUGH: 1
SHORTNESS OF BREATH: 1
PALPITATIONS: 0
CLAUDICATION: 1

## 2019-04-11 NOTE — PROGRESS NOTES
"Chief Complaint   Patient presents with   • Coronary Artery Disease     follow up       Subjective:   Fredy Gonsalez Jr. is a 73 y.o. male who presents today for follow-up evaluation of CAD, CABG, coronary intervention, myocardial infarction and hyperlipidemia.    Last seen on 10/9/2018.    Since 10/9/2018 appointment the patient has some substernal nonradiating chest pain with or without exertion with no other associated symptoms.  Continues to smoke cigarettes.  States that Chantix made him sick and he is tried stop with hypnosis x2.  Has bilateral hip pain with walking relieved with rest.  Chronically short of breath.  Medical records were requested but not available.    Past medical history  According to the patient he was hospitalized at [facility] in [place], [place] where he was hospitalized in  for a \"heart attack\".  The patient had both coronary intervention of the left main ostium, left main, LAD and mid LAD with 3 separate stents and CABG the sequence of which the patient cannot explain and there are no medical records available.    The patient states that he is compliant with his medications.  The patient continues to smoke a pack of cigarettes a day and is done so for \"60 years\".    The patient reports having nonexertional chest pains.  The patient reports severe bilateral hip pain with walking relieved with rest.    Family history  No heart disease.  Father  age 32 of kidney disease.    Past Medical History:   Diagnosis Date   • Arthritis    • COPD    • EMPHYSEMA    • Heart attack (HCC) 2017    received 3 stents, [facility]   • Hyperlipidemia 2012     Past Surgical History:   Procedure Laterality Date   • STENT PLACEMENT  2017    left main to proximal LAD, mid LAD, ostial left main   • OPEN REDUCTION      left arm. 32 yrs ago     Family History   Problem Relation Age of Onset   • Arthritis Mother    • Lung Disease Mother    • " Osteoporosis Mother    • Alzheimer's Disease Mother    • Other Mother         parkinsons   • Kidney Disease Father    • Cancer Neg Hx    • Diabetes Neg Hx    • Heart Disease Neg Hx    • Stroke Neg Hx      Social History     Social History   • Marital status: Single     Spouse name: N/A   • Number of children: N/A   • Years of education: N/A     Occupational History   • Not on file.     Social History Main Topics   • Smoking status: Current Every Day Smoker     Packs/day: 1.00     Years: 60.00     Types: Cigarettes     Start date: 6/15/1957   • Smokeless tobacco: Former User     Types: Chew   • Alcohol use No      Comment: quit 28yrs ago   • Drug use: No      Comment: quit in 1969, THC Hash    • Sexual activity: No     Other Topics Concern   • Not on file     Social History Narrative   • No narrative on file     Allergies   Allergen Reactions   • Chantix [Varenicline]      Made patient feel sick.   • Fish Vomiting     Outpatient Encounter Prescriptions as of 4/11/2019   Medication Sig Dispense Refill   • budesonide-formoterol (SYMBICORT) 160-4.5 MCG/ACT Aerosol Inhale 2 Puffs by mouth 2 Times a Day. 2 Inhaler 0   • Tiotropium Bromide Monohydrate (SPIRIVA RESPIMAT) 2.5 MCG/ACT Aero Soln Inhale 2 Inhalation by mouth every day. Assemble and prime. 2 Inhaler 0   • aspirin EC (ECOTRIN) 81 MG Tablet Delayed Response Take 81 mg by mouth every day.     • atorvastatin (LIPITOR) 40 MG Tab Take 1 Tab by mouth every day. 30 Tab 11   • clopidogrel (PLAVIX) 75 MG Tab Take 1 Tab by mouth every day. 30 Tab 11   • lisinopril (PRINIVIL) 5 MG Tab Take 1 Tab by mouth every day. 30 Tab 11   • metoprolol SR (TOPROL XL) 50 MG TABLET SR 24 HR Take 1 Tab by mouth every day. 30 Tab 11   • albuterol 108 (90 BASE) MCG/ACT Aero Soln inhalation aerosol Inhale 2 Puffs by mouth every 6 hours as needed for Shortness of Breath. 8.5 g 11   • albuterol (PROAIR HFA) 108 (90 BASE) MCG/ACT Aero Soln inhalation aerosol Inhale 2 Puffs by mouth every 6 hours  "as needed. 8.5 g 3   • HYDROcodone-acetaminophen (NORCO) 5-325 MG Tab per tablet Take 1 Tab by mouth every 8 hours as needed for up to 30 days. Prescription must last 30 days. 90 Tab 0   • nitroglycerin (NITROSTAT) 0.4 MG SL Tab ONE TABLET UNDER TONGUE AS NEEDED FOR CHEST PAIN 25 Tab 0     No facility-administered encounter medications on file as of 4/11/2019.      Review of Systems   Respiratory: Positive for cough and shortness of breath.    Cardiovascular: Positive for chest pain and claudication. Negative for palpitations and leg swelling.        Objective:   /70 (BP Location: Left arm, Patient Position: Sitting)   Pulse 92   Ht 1.702 m (5' 7\")   Wt 73.9 kg (163 lb)   SpO2 96%   BMI 25.53 kg/m²     Physical Exam   Constitutional: He is oriented to person, place, and time. He appears well-developed and well-nourished.   HENT:   Head: Normocephalic and atraumatic.   Poor dentition.   Eyes: EOM are normal.   Neck: Neck supple. No JVD present.   Cardiovascular: Normal rate, regular rhythm, normal heart sounds and intact distal pulses.  Exam reveals no gallop and no friction rub.    No murmur heard.  Pulses:       Carotid pulses are 0 on the right side, and 1+ on the left side.       Radial pulses are 0 on the right side, and 1+ on the left side.        Femoral pulses are 1+ on the right side with bruit, and 1+ on the left side with bruit.       Posterior tibial pulses are 1+ on the right side, and 1+ on the left side.   Pulmonary/Chest: Effort normal and breath sounds normal. No respiratory distress. He has no wheezes. He has no rales.   Musculoskeletal: Normal range of motion. He exhibits no edema or deformity.   Neurological: He is alert and oriented to person, place, and time. No cranial nerve deficit. He exhibits normal muscle tone.   Skin: Skin is warm and dry.   Psychiatric: He has a normal mood and affect. His behavior is normal.     10/09/2018 EKG: Normal sinus rhythm, rate 61.  Inferior posterior " myocardial infarction.  Reviewed by myself.    Assessment:     1. Coronary artery disease, occlusive  EC-ECHOCARDIOGRAM COMPLETE W/O CONT   2. Atypical chest pain  EC-ECHOCARDIOGRAM COMPLETE W/O CONT   3. S/P coronary artery stent placement     4. Dyslipidemia  LIPID PANEL   5. History of myocardial infarction  EC-ECHOCARDIOGRAM COMPLETE W/O CONT   6. PVD (peripheral vascular disease) ([facility])     7. Tobacco abuse     8. ST elevation myocardial infarction involving right coronary artery ([facility])  NM-CARDIAC STRESS TEST   9. S/P CABG (coronary artery bypass graft)  EC-ECHOCARDIOGRAM COMPLETE W/O CONT       Medical Decision Making:  Today's Assessment / Status / Plan:     1.  The patient's chest pain is nonischemic.  2.  The patient's cardiac status is currently on a comprehensive cardiovascular medical regimen.  3.  The patient was again counseled to stop smoking which he states that he is not plan to do since he enjoys smoking cigarettes.  4.  We will try again to obtain medical records from [facility] in [place], [place] [facility].  5.  We will proceed with an MPI, echocardiogram and lipid panel.  6.  Follow-up 6 months.

## 2019-04-12 ENCOUNTER — OFFICE VISIT (OUTPATIENT)
Dept: MEDICAL GROUP | Facility: MEDICAL CENTER | Age: 74
End: 2019-04-12
Attending: FAMILY MEDICINE
Payer: MEDICARE

## 2019-04-12 ENCOUNTER — HOSPITAL ENCOUNTER (OUTPATIENT)
Dept: LAB | Facility: MEDICAL CENTER | Age: 74
End: 2019-04-12
Attending: INTERNAL MEDICINE
Payer: MEDICARE

## 2019-04-12 VITALS
OXYGEN SATURATION: 95 % | RESPIRATION RATE: 16 BRPM | HEIGHT: 67 IN | DIASTOLIC BLOOD PRESSURE: 52 MMHG | SYSTOLIC BLOOD PRESSURE: 108 MMHG | WEIGHT: 164 LBS | HEART RATE: 72 BPM | BODY MASS INDEX: 25.74 KG/M2 | TEMPERATURE: 97.2 F

## 2019-04-12 DIAGNOSIS — E78.5 DYSLIPIDEMIA: ICD-10-CM

## 2019-04-12 DIAGNOSIS — M25.552 BILATERAL HIP PAIN: ICD-10-CM

## 2019-04-12 DIAGNOSIS — Z95.5 S/P CORONARY ARTERY STENT PLACEMENT: ICD-10-CM

## 2019-04-12 DIAGNOSIS — M48.061 FORAMINAL STENOSIS OF LUMBAR REGION: ICD-10-CM

## 2019-04-12 DIAGNOSIS — C44.319 BASAL CELL CARCINOMA (BCC) OF BROW: ICD-10-CM

## 2019-04-12 DIAGNOSIS — M19.90 ARTHRITIS: ICD-10-CM

## 2019-04-12 DIAGNOSIS — N18.30 CHRONIC RENAL FAILURE, STAGE 3 (MODERATE) (HCC): ICD-10-CM

## 2019-04-12 DIAGNOSIS — M25.551 BILATERAL HIP PAIN: ICD-10-CM

## 2019-04-12 LAB
CHOLEST SERPL-MCNC: 115 MG/DL (ref 100–199)
FASTING STATUS PATIENT QL REPORTED: NORMAL
HDLC SERPL-MCNC: 37 MG/DL
LDLC SERPL CALC-MCNC: 68 MG/DL
TRIGL SERPL-MCNC: 48 MG/DL (ref 0–149)

## 2019-04-12 PROCEDURE — 99213 OFFICE O/P EST LOW 20 MIN: CPT | Performed by: FAMILY MEDICINE

## 2019-04-12 PROCEDURE — 80061 LIPID PANEL: CPT

## 2019-04-12 PROCEDURE — 99214 OFFICE O/P EST MOD 30 MIN: CPT | Performed by: FAMILY MEDICINE

## 2019-04-12 PROCEDURE — 36415 COLL VENOUS BLD VENIPUNCTURE: CPT

## 2019-04-12 RX ORDER — HYDROCODONE BITARTRATE AND ACETAMINOPHEN 5; 325 MG/1; MG/1
1 TABLET ORAL EVERY 8 HOURS PRN
Qty: 90 TAB | Refills: 0 | Status: SHIPPED | OUTPATIENT
Start: 2019-05-31 | End: 2019-06-25 | Stop reason: SDUPTHER

## 2019-04-12 RX ORDER — ATORVASTATIN CALCIUM 40 MG/1
40 TABLET, FILM COATED ORAL DAILY
Qty: 30 TAB | Refills: 11 | Status: SHIPPED | OUTPATIENT
Start: 2019-04-12 | End: 2019-12-01

## 2019-04-12 RX ORDER — HYDROCODONE BITARTRATE AND ACETAMINOPHEN 5; 325 MG/1; MG/1
1 TABLET ORAL EVERY 8 HOURS PRN
Qty: 90 TAB | Refills: 0 | Status: SHIPPED | OUTPATIENT
Start: 2019-05-03 | End: 2019-06-02

## 2019-04-12 RX ORDER — CLOPIDOGREL BISULFATE 75 MG/1
75 TABLET ORAL DAILY
Qty: 30 TAB | Refills: 11 | Status: SHIPPED | OUTPATIENT
Start: 2019-04-12 | End: 2019-10-08

## 2019-04-12 RX ORDER — LISINOPRIL 5 MG/1
5 TABLET ORAL DAILY
Qty: 30 TAB | Refills: 11 | Status: SHIPPED | OUTPATIENT
Start: 2019-04-12 | End: 2019-12-01

## 2019-04-12 RX ORDER — METOPROLOL SUCCINATE 50 MG/1
50 TABLET, EXTENDED RELEASE ORAL DAILY
Qty: 30 TAB | Refills: 11 | Status: SHIPPED | OUTPATIENT
Start: 2019-04-12 | End: 2019-12-01

## 2019-04-12 ASSESSMENT — PATIENT HEALTH QUESTIONNAIRE - PHQ9: CLINICAL INTERPRETATION OF PHQ2 SCORE: 0

## 2019-04-12 NOTE — PROGRESS NOTES
Subjective:      Fredy Gonsalez Jr. is a 73 y.o. male who presents with No chief complaint on file.            HPI 1.  Chronic renal failure-most recent creatinine from 10 days ago is slightly elevated at 1.53 previous level was 1.48.  Calculated filtration rate 46 cc/min.  Patient not reporting oliguria, dysuria, flank pain.  He does continue to smoke about 1 pack of cigarettes per day.  2.  Chronic bilateral hip pain-patient reports continued bilateral hip pain exacerbated by standing or walking more than about 2 blocks.  Patient does have history of coronary disease and is assumed to have significant atherosclerosis.  Pain is managed in part with consistent use of low-dose Norco.  3.  Skin lesion-patient notes a 6-month history of a growth above his right eye.  He reports that occasionally will bleed slightly.  It is not painful.  Denies any widespread rashes or change in vision associated with this lesion.  ROS notes dyspnea on activity, brief morning cough, no hemoptysis  Social history-single, retired  Current medication-  Prior to Admission medications    Medication Sig Start Date End Date Taking? Authorizing Provider   atorvastatin (LIPITOR) 40 MG Tab Take 1 Tab by mouth every day. 4/12/19  Yes Robert Lindo M.D.   metoprolol SR (TOPROL XL) 50 MG TABLET SR 24 HR Take 1 Tab by mouth every day. 4/12/19  Yes Robert Lindo M.D.   clopidogrel (PLAVIX) 75 MG Tab Take 1 Tab by mouth every day. 4/12/19  Yes Robert Lindo M.D.   lisinopril (PRINIVIL) 5 MG Tab Take 1 Tab by mouth every day. 4/12/19  Yes Robert Lindo M.D.   HYDROcodone-acetaminophen (NORCO) 5-325 MG Tab per tablet Take 1 Tab by mouth every 8 hours as needed for up to 30 days. Prescription must last 30 days. 5/3/19 6/2/19 Yes Robert Lindo M.D.   HYDROcodone-acetaminophen (NORCO) 5-325 MG Tab per tablet Take 1 Tab by mouth every 8 hours as needed for up to 30 days. Prescription must last 30 days. 5/31/19 6/30/19 Yes  "Robert Lindo M.D.   budesonide-formoterol (SYMBICORT) 160-4.5 MCG/ACT Aerosol Inhale 2 Puffs by mouth 2 Times a Day. 3/11/19   NASRA Blanchard.P.RBennettN.   Tiotropium Bromide Monohydrate (SPIRIVA RESPIMAT) 2.5 MCG/ACT Aero Soln Inhale 2 Inhalation by mouth every day. Assemble and prime. 3/11/19   Adriana Landers A.P.RBennettN.   HYDROcodone-acetaminophen (NORCO) 5-325 MG Tab per tablet Take 1 Tab by mouth every 8 hours as needed for up to 30 days. Prescription must last 30 days. 3/28/19 4/27/19  Robert Lindo M.D.   nitroglycerin (NITROSTAT) 0.4 MG SL Tab ONE TABLET UNDER TONGUE AS NEEDED FOR CHEST PAIN 8/31/18   Robert Lindo M.D.   aspirin EC (ECOTRIN) 81 MG Tablet Delayed Response Take 81 mg by mouth every day.    Physician Outpatient   albuterol 108 (90 BASE) MCG/ACT Aero Soln inhalation aerosol Inhale 2 Puffs by mouth every 6 hours as needed for Shortness of Breath. 1/20/17   Robert Lindo M.D.   albuterol (PROAIR HFA) 108 (90 BASE) MCG/ACT Aero Soln inhalation aerosol Inhale 2 Puffs by mouth every 6 hours as needed. 10/20/16   Robert Lindo M.D.       Obtained and reviewed [facility] report [facility] on 4/12/19, and based on assessment of report, the prescription for Norco 5/325 is medically necessary     Objective:     /52 (BP Location: Left arm, Patient Position: Sitting, BP Cuff Size: Adult)   Pulse 72   Temp 36.2 °C (97.2 °F) (Temporal)   Resp 16   Ht 1.702 m (5' 7.01\")   Wt 74.4 kg (164 lb)   SpO2 95%   BMI 25.68 kg/m²      Physical Exam  Gen.- alert, cooperative, in no acute distress  Neck- midline trachea, thyroid not enlarged or tender,supple, no cervical adenopathy  Chest-clear to auscultation and percussion with normal breath sounds. No retractions. Chest wall nontender  Cardiac- regular rhythm and rate. No murmur, thrill, or heave  Hips-absent left femoral pulse 1+ right femoral pulse with bruit   Skin-14 mm triangular-shaped " raised fish flesh lesion was too small crusted ulceration areas laterally overlying the right eyebrow       Assessment/Plan:     1. S/P coronary artery stent placement      2. Dyslipidemia      3. Chronic renal failure, stage 3 (moderate) (HCC)    - Basic Metabolic Panel; Future    4. Basal cell carcinoma (BCC) of brow    - REFERRAL TO DERMATOLOGY    Plan: 1.  Renew Norco 5/325 x 2 months  2.  Dermatology referral for excision of basal cell carcinoma right forehead  3.  Patient is again encouraged to quit or reduce tobacco  4.  Repeat BMP and visit in 2 months

## 2019-04-16 ENCOUNTER — NON-PROVIDER VISIT (OUTPATIENT)
Dept: PULMONOLOGY | Facility: HOSPICE | Age: 74
End: 2019-04-16
Attending: NURSE PRACTITIONER
Payer: MEDICARE

## 2019-04-16 ENCOUNTER — OFFICE VISIT (OUTPATIENT)
Dept: PULMONOLOGY | Facility: HOSPICE | Age: 74
End: 2019-04-16
Payer: MEDICARE

## 2019-04-16 VITALS — WEIGHT: 162 LBS | BODY MASS INDEX: 25.37 KG/M2

## 2019-04-16 VITALS
DIASTOLIC BLOOD PRESSURE: 58 MMHG | WEIGHT: 163 LBS | OXYGEN SATURATION: 95 % | BODY MASS INDEX: 24.71 KG/M2 | RESPIRATION RATE: 16 BRPM | HEIGHT: 68 IN | HEART RATE: 64 BPM | SYSTOLIC BLOOD PRESSURE: 108 MMHG

## 2019-04-16 DIAGNOSIS — J43.1 PANLOBULAR EMPHYSEMA (HCC): ICD-10-CM

## 2019-04-16 DIAGNOSIS — Z72.0 TOBACCO ABUSE: ICD-10-CM

## 2019-04-16 DIAGNOSIS — J84.10 PULMONARY FIBROSIS (HCC): ICD-10-CM

## 2019-04-16 DIAGNOSIS — G47.34 NOCTURNAL HYPOXIA: ICD-10-CM

## 2019-04-16 PROCEDURE — 94060 EVALUATION OF WHEEZING: CPT | Performed by: INTERNAL MEDICINE

## 2019-04-16 PROCEDURE — 94729 DIFFUSING CAPACITY: CPT | Performed by: INTERNAL MEDICINE

## 2019-04-16 PROCEDURE — 94726 PLETHYSMOGRAPHY LUNG VOLUMES: CPT | Performed by: INTERNAL MEDICINE

## 2019-04-16 PROCEDURE — 99214 OFFICE O/P EST MOD 30 MIN: CPT | Performed by: NURSE PRACTITIONER

## 2019-04-16 ASSESSMENT — PULMONARY FUNCTION TESTS
FEV1_LLN: 2.46
FVC_PREDICTED: 4.04
FEV1/FVC_PREDICTED: 73
FEV1/FVC_PERCENT_PREDICTED: 73
FEV1: 2.37
FEV1: 1.98
FVC_LLN: 3.37
FEV1_PERCENT_CHANGE: 19
FVC_PERCENT_PREDICTED: 101
FEV1/FVC_PERCENT_PREDICTED: 79
FEV1/FVC_PERCENT_LLN: 61
FEV1/FVC_PERCENT_CHANGE: 5
FEV1/FVC: 55
FEV1/FVC_PERCENT_CHANGE: 146
FEV1/FVC: 55
FVC_PERCENT_PREDICTED: 89
FEV1/FVC: 58
FEV1/FVC: 57.66
FEV1/FVC_PERCENT_PREDICTED: 74
FEV1_PERCENT_PREDICTED: 80
FEV1_PERCENT_PREDICTED: 67
FEV1/FVC_PERCENT_PREDICTED: 75
FVC: 4.11
FEV1_PREDICTED: 2.95
FEV1_PERCENT_CHANGE: 13
FEV1/FVC_PERCENT_PREDICTED: 78
FVC: 3.63

## 2019-04-16 ASSESSMENT — ENCOUNTER SYMPTOMS
SHORTNESS OF BREATH: 1
MYALGIAS: 0
NECK PAIN: 1
NEUROLOGICAL NEGATIVE: 1
BRUISES/BLEEDS EASILY: 0
CARDIOVASCULAR NEGATIVE: 1
EYE DISCHARGE: 0
FALLS: 0
SPUTUM PRODUCTION: 0
CONSTITUTIONAL NEGATIVE: 1
GASTROINTESTINAL NEGATIVE: 1
PSYCHIATRIC NEGATIVE: 1
EYE PAIN: 0
WHEEZING: 0
BACK PAIN: 1
COUGH: 0
HEMOPTYSIS: 0

## 2019-04-16 NOTE — PROCEDURES
Technician: RIKI Boyle    Technician Comment:  Good patient effort & cooperation.  The results of this test meet the ATS/ERS standards for acceptability & reproducibility.  Test was performed on the [employer] Body Plethysmograph-Elite DX system.  Predicted values were [facility]-3 for spirometry, [facility] for DLCO, ITS for Lung Volumes.  The DLCO was uncorrected for Hgb.  A bronchodilator of Ventolin HFA -2puffs via spacer administered.  DLCO performed during dilation period.    Interpretation:    Lung function testing completed on April 16, 2019 shows moderate to severe reduction of mid flows at 31% predicted.  Definite bronchodilator response at that level.  FEV1 is moderately reduced to 1.98 L, with bronchodilator response seen at that level as well.  FEV1/FVC is 55%.  Mild hyperinflation is noted.  Oxygen transfer is mildly reduced at 76% predicted.  Flow volume loop confirms the significant obstructive pattern with good effort noted

## 2019-04-16 NOTE — PROGRESS NOTES
Chief Complaint   Patient presents with   • Results     OPO 04/11/19, PFT 04/16/19   • COPD     Last seen 03/11/19         HPI: This patient is a 73 y.o. male, who presents for follow-up COPD/emphysema and AMIRA.      He has a history of pulmonary nodule stable times 2 years.  He is a current smoker, 30 + pk year hx. smoking cessation has been strongly advised, he is not interested in quitting smoking. Fredy has tried multiple interventions for smoking cessation including Chantix, hypnosis, nicotine patches and gum without benefit.       PFTs today show moderate obstructive lung disease with positive bronchodilator response.  FEV1 1.98 L 67% predicted, FEV1 FVC ratio 55, DLCO 76% predicted.  TLC is normal at 106.  FEV1 improves to 2.37 L 80% predicted after albuterol was administered. CT in September 2018 which indicates patchy nonspecific pulmonary fibrotic changes again seen and similar to prior exam (2016) with emphysematous changes also noted. No suspicious pulmonary nodule or mass. He is compliant with Symbicort 160/4.5, 2 puffs, twice a day and Spiriva daily.  Exertional dyspnea is stable.  Multiox today is normal on room air.  He denies routine cough or wheeze.  He denies URI since he was last seen.    He has a history of AMIRA intolerant to CPAP.  Recent OPO shows basal SPO2 of 88%.  We will restart him on nocturnal oxygen. He would like to use [employer].    Patient has a history of CAD with prior CABG and follows with cardiology.    Past Medical History:   Diagnosis Date   • Arthritis    • COPD    • EMPHYSEMA    • Heart attack (HCC) 12/02/2017    received 3 stents, [facility]   • Hyperlipidemia 4/17/2012       Social History   Substance Use Topics   • Smoking status: Current Every Day Smoker     Packs/day: 1.00     Years: 60.00     Types: Cigarettes     Start date: 6/15/1957   • Smokeless tobacco: Former User     Types: Chew     Quit date: 1957   • Alcohol use No      Comment: quit 28yrs ago        Family History   Problem Relation Age of Onset   • Arthritis Mother    • Lung Disease Mother    • Osteoporosis Mother    • Alzheimer's Disease Mother    • Other Mother         parkinsons   • Kidney Disease Father    • Cancer Neg Hx    • Diabetes Neg Hx    • Heart Disease Neg Hx    • Stroke Neg Hx        Immunization History   Administered Date(s) Administered   • Hep A/HEP B Combined Vaccine (TwinRix) 07/06/2006   • Influenza Seasonal Injectable 10/24/2013, 11/01/2014   • Influenza Vaccine Adult HD 10/25/2018   • Influenza Vaccine Quad Inj (Preserved) 11/02/2015, 10/20/2016   • Pneumococcal Conjugate Vaccine (Prevnar/PCV-13) 09/28/2015   • Pneumococcal polysaccharide vaccine (PPSV-23) 12/18/2013   • Tdap Vaccine 07/06/2006, 12/30/2015       Current medications as of today   Current Outpatient Prescriptions   Medication Sig Dispense Refill   • atorvastatin (LIPITOR) 40 MG Tab Take 1 Tab by mouth every day. 30 Tab 11   • metoprolol SR (TOPROL XL) 50 MG TABLET SR 24 HR Take 1 Tab by mouth every day. 30 Tab 11   • clopidogrel (PLAVIX) 75 MG Tab Take 1 Tab by mouth every day. 30 Tab 11   • lisinopril (PRINIVIL) 5 MG Tab Take 1 Tab by mouth every day. 30 Tab 11   • [START ON 5/3/2019] HYDROcodone-acetaminophen (NORCO) 5-325 MG Tab per tablet Take 1 Tab by mouth every 8 hours as needed for up to 30 days. Prescription must last 30 days. 90 Tab 0   • [START ON 5/31/2019] HYDROcodone-acetaminophen (NORCO) 5-325 MG Tab per tablet Take 1 Tab by mouth every 8 hours as needed for up to 30 days. Prescription must last 30 days. 90 Tab 0   • budesonide-formoterol (SYMBICORT) 160-4.5 MCG/ACT Aerosol Inhale 2 Puffs by mouth 2 Times a Day. 2 Inhaler 0   • Tiotropium Bromide Monohydrate (SPIRIVA RESPIMAT) 2.5 MCG/ACT Aero Soln Inhale 2 Inhalation by mouth every day. Assemble and prime. 2 Inhaler 0   • HYDROcodone-acetaminophen (NORCO) 5-325 MG Tab per tablet Take 1 Tab by mouth every 8 hours as needed for up to 30 days.  "Prescription must last 30 days. 90 Tab 0   • nitroglycerin (NITROSTAT) 0.4 MG SL Tab ONE TABLET UNDER TONGUE AS NEEDED FOR CHEST PAIN 25 Tab 0   • aspirin EC (ECOTRIN) 81 MG Tablet Delayed Response Take 81 mg by mouth every day.     • albuterol 108 (90 BASE) MCG/ACT Aero Soln inhalation aerosol Inhale 2 Puffs by mouth every 6 hours as needed for Shortness of Breath. 8.5 g 11   • albuterol (PROAIR HFA) 108 (90 BASE) MCG/ACT Aero Soln inhalation aerosol Inhale 2 Puffs by mouth every 6 hours as needed. 8.5 g 3     No current facility-administered medications for this visit.        Allergies: Chantix [varenicline] and Fish    /58 (BP Location: Left arm, Patient Position: Sitting, BP Cuff Size: Adult)   Pulse 64   Resp 16   Ht 1.727 m (5' 8\")   Wt 73.9 kg (163 lb)   SpO2 95%       Review of Systems   Constitutional: Negative.         + Dental caries   HENT: Negative.    Eyes: Negative for pain and discharge.   Respiratory: Positive for shortness of breath. Negative for cough, hemoptysis, sputum production and wheezing.    Cardiovascular: Negative.    Gastrointestinal: Negative.    Musculoskeletal: Positive for back pain, joint pain and neck pain. Negative for falls and myalgias.   Skin: Negative.    Neurological: Negative.    Endo/Heme/Allergies: Negative for environmental allergies. Does not bruise/bleed easily.   Psychiatric/Behavioral: Negative.        Physical Exam   Constitutional: He is oriented to person, place, and time and well-developed, well-nourished, and in no distress.   Strong cigarette odor   HENT:   Head: Normocephalic and atraumatic.   Mouth/Throat: Oropharynx is clear and moist.   Poor dentition   Eyes: Pupils are equal, round, and reactive to light.   Neck: Normal range of motion. Neck supple. No tracheal deviation present.   Cardiovascular: Normal rate, regular rhythm and normal heart sounds.    Pulmonary/Chest: Effort normal and breath sounds normal. No respiratory distress. He has no " wheezes. He has no rales.   Diminished bilateral bases otherwise clear   Musculoskeletal: Normal range of motion.   Neurological: He is alert and oriented to person, place, and time.   Skin: Skin is warm and dry.   Psychiatric: Mood, memory, affect and judgment normal.   Vitals reviewed.      Diagnoses/Plan:    1. Panlobular emphysema (HCC)  Former CT notes patchy fibrotic changes, however PFTs today show no restrictive changes, TLC is normal.  CT reviewed with Dr. Scales, findings are NOT consistent with ILD, and are likely related to emphysema.  Multiple oximetry today is normal.  He will continue Spiriva and Symbicort.  We will continue to provide him samples, inhalers are cost prohibitive.  - DME O2 New Set Up  - Multiple Oximetry; Future    2. Tobacco abuse  Permanent cessation is strongly recommended or stands it continuing to smoke increases his risk for lung cancer and worsening lung disease.  He declined smoking cessation.    3. Nocturnal hypoxia  Overnight oximetry shows basal SPO2 of 88%.  We will start the patient on nocturnal oxygen.  - DME O2 New Set Up    I would like him to follow-up in 6 months, sooner if necessary.    He is a candidate for annual screening CT given his ongoing smoking.  We will plan to refer him to lung cancer screening program at next visit.    This dictation was created using voice recognition software. The accuracy of the dictation is limited to the abilities of the software. I expect there may be some errors of grammar and possibly content.

## 2019-04-23 DIAGNOSIS — G47.34 NOCTURNAL HYPOXIA: ICD-10-CM

## 2019-04-23 NOTE — PROCEDURES
Patient was studied with continuous pulse oximetry on room air.  Baseline oxygen saturation was 88.9%.  The patient spent a total of 31 minutes with saturations less than 88%.  For most of the evening saturations remained in the high 80s.  However late in the evening there was one episode of significant desaturation down to 70% suggesting underlying obstructive sleep apnea.  This patient may benefit from polysomnography.

## 2019-04-30 ENCOUNTER — HOSPITAL ENCOUNTER (OUTPATIENT)
Dept: RADIOLOGY | Facility: MEDICAL CENTER | Age: 74
End: 2019-04-30
Attending: INTERNAL MEDICINE
Payer: MEDICARE

## 2019-04-30 ENCOUNTER — HOSPITAL ENCOUNTER (OUTPATIENT)
Dept: CARDIOLOGY | Facility: MEDICAL CENTER | Age: 74
End: 2019-04-30
Attending: INTERNAL MEDICINE
Payer: MEDICARE

## 2019-04-30 DIAGNOSIS — R07.89 ATYPICAL CHEST PAIN: ICD-10-CM

## 2019-04-30 DIAGNOSIS — I21.11 ST ELEVATION MYOCARDIAL INFARCTION INVOLVING RIGHT CORONARY ARTERY (HCC): ICD-10-CM

## 2019-04-30 DIAGNOSIS — Z95.1 S/P CABG (CORONARY ARTERY BYPASS GRAFT): ICD-10-CM

## 2019-04-30 DIAGNOSIS — I25.2 HISTORY OF MYOCARDIAL INFARCTION: ICD-10-CM

## 2019-04-30 DIAGNOSIS — I25.10 CORONARY ARTERY DISEASE, OCCLUSIVE: ICD-10-CM

## 2019-04-30 PROCEDURE — 78452 HT MUSCLE IMAGE SPECT MULT: CPT

## 2019-04-30 PROCEDURE — 93018 CV STRESS TEST I&R ONLY: CPT | Performed by: INTERNAL MEDICINE

## 2019-04-30 PROCEDURE — 93306 TTE W/DOPPLER COMPLETE: CPT

## 2019-04-30 PROCEDURE — 78452 HT MUSCLE IMAGE SPECT MULT: CPT | Mod: 26 | Performed by: INTERNAL MEDICINE

## 2019-05-01 LAB
LV EJECT FRACT  99904: 55
LV EJECT FRACT MOD 2C 99903: 35.75
LV EJECT FRACT MOD 4C 99902: 59.46
LV EJECT FRACT MOD BP 99901: 54.63

## 2019-05-01 PROCEDURE — 93306 TTE W/DOPPLER COMPLETE: CPT | Mod: 26 | Performed by: INTERNAL MEDICINE

## 2019-05-07 ENCOUNTER — TELEPHONE (OUTPATIENT)
Dept: CARDIOLOGY | Facility: MEDICAL CENTER | Age: 74
End: 2019-05-07

## 2019-05-07 NOTE — TELEPHONE ENCOUNTER
Myocardial Perfusion   Report   NUCLEAR IMAGING INTERPRETATION   * Small sized, moderate severity, partially reversible defect in the distal    to mid inferior wall. SDS of 2, SSS of 4.    * Normal left ventricular size, ejection fraction, and wall motion.   ECG INTERPRETATION   Negative stress ECG for ischemia.    To SW to advise (Next FV 10/7/19)

## 2019-06-09 ENCOUNTER — APPOINTMENT (OUTPATIENT)
Dept: RADIOLOGY | Facility: MEDICAL CENTER | Age: 74
End: 2019-06-09
Attending: EMERGENCY MEDICINE
Payer: MEDICARE

## 2019-06-09 ENCOUNTER — HOSPITAL ENCOUNTER (EMERGENCY)
Facility: MEDICAL CENTER | Age: 74
End: 2019-06-09
Attending: EMERGENCY MEDICINE
Payer: MEDICARE

## 2019-06-09 VITALS
HEART RATE: 97 BPM | HEIGHT: 68 IN | BODY MASS INDEX: 24.06 KG/M2 | OXYGEN SATURATION: 96 % | WEIGHT: 158.73 LBS | TEMPERATURE: 97.2 F | RESPIRATION RATE: 13 BRPM

## 2019-06-09 DIAGNOSIS — J44.1 ACUTE EXACERBATION OF CHRONIC OBSTRUCTIVE PULMONARY DISEASE (COPD) (HCC): ICD-10-CM

## 2019-06-09 LAB
ALBUMIN SERPL BCP-MCNC: 4.2 G/DL (ref 3.2–4.9)
ALBUMIN/GLOB SERPL: 1.4 G/DL
ALP SERPL-CCNC: 62 U/L (ref 30–99)
ALT SERPL-CCNC: 8 U/L (ref 2–50)
ANION GAP SERPL CALC-SCNC: 11 MMOL/L (ref 0–11.9)
AST SERPL-CCNC: 12 U/L (ref 12–45)
BASOPHILS # BLD AUTO: 0.9 % (ref 0–1.8)
BASOPHILS # BLD: 0.09 K/UL (ref 0–0.12)
BILIRUB SERPL-MCNC: 0.5 MG/DL (ref 0.1–1.5)
BUN SERPL-MCNC: 21 MG/DL (ref 8–22)
CALCIUM SERPL-MCNC: 9.5 MG/DL (ref 8.5–10.5)
CHLORIDE SERPL-SCNC: 108 MMOL/L (ref 96–112)
CO2 SERPL-SCNC: 22 MMOL/L (ref 20–33)
CREAT SERPL-MCNC: 1.48 MG/DL (ref 0.5–1.4)
D DIMER PPP IA.FEU-MCNC: 1.51 UG/ML (FEU) (ref 0–0.5)
EKG IMPRESSION: NORMAL
EOSINOPHIL # BLD AUTO: 0.98 K/UL (ref 0–0.51)
EOSINOPHIL NFR BLD: 10.1 % (ref 0–6.9)
ERYTHROCYTE [DISTWIDTH] IN BLOOD BY AUTOMATED COUNT: 50.8 FL (ref 35.9–50)
GLOBULIN SER CALC-MCNC: 3 G/DL (ref 1.9–3.5)
GLUCOSE SERPL-MCNC: 126 MG/DL (ref 65–99)
HCT VFR BLD AUTO: 45.3 % (ref 42–52)
HGB BLD-MCNC: 15.4 G/DL (ref 14–18)
IMM GRANULOCYTES # BLD AUTO: 0.03 K/UL (ref 0–0.11)
IMM GRANULOCYTES NFR BLD AUTO: 0.3 % (ref 0–0.9)
LYMPHOCYTES # BLD AUTO: 2.4 K/UL (ref 1–4.8)
LYMPHOCYTES NFR BLD: 24.8 % (ref 22–41)
MCH RBC QN AUTO: 34.4 PG (ref 27–33)
MCHC RBC AUTO-ENTMCNC: 34 G/DL (ref 33.7–35.3)
MCV RBC AUTO: 101.1 FL (ref 81.4–97.8)
MONOCYTES # BLD AUTO: 0.71 K/UL (ref 0–0.85)
MONOCYTES NFR BLD AUTO: 7.3 % (ref 0–13.4)
NEUTROPHILS # BLD AUTO: 5.46 K/UL (ref 1.82–7.42)
NEUTROPHILS NFR BLD: 56.6 % (ref 44–72)
NRBC # BLD AUTO: 0 K/UL
NRBC BLD-RTO: 0 /100 WBC
PLATELET # BLD AUTO: 228 K/UL (ref 164–446)
PMV BLD AUTO: 9.5 FL (ref 9–12.9)
POTASSIUM SERPL-SCNC: 4.3 MMOL/L (ref 3.6–5.5)
PROT SERPL-MCNC: 7.2 G/DL (ref 6–8.2)
RBC # BLD AUTO: 4.48 M/UL (ref 4.7–6.1)
SODIUM SERPL-SCNC: 141 MMOL/L (ref 135–145)
TROPONIN I SERPL-MCNC: <0.01 NG/ML (ref 0–0.04)
WBC # BLD AUTO: 9.7 K/UL (ref 4.8–10.8)

## 2019-06-09 PROCEDURE — 71275 CT ANGIOGRAPHY CHEST: CPT

## 2019-06-09 PROCEDURE — 700117 HCHG RX CONTRAST REV CODE 255: Performed by: EMERGENCY MEDICINE

## 2019-06-09 PROCEDURE — 85379 FIBRIN DEGRADATION QUANT: CPT

## 2019-06-09 PROCEDURE — 304561 HCHG STAT O2

## 2019-06-09 PROCEDURE — 93005 ELECTROCARDIOGRAM TRACING: CPT | Performed by: EMERGENCY MEDICINE

## 2019-06-09 PROCEDURE — 84484 ASSAY OF TROPONIN QUANT: CPT

## 2019-06-09 PROCEDURE — 85025 COMPLETE CBC W/AUTO DIFF WBC: CPT

## 2019-06-09 PROCEDURE — 700111 HCHG RX REV CODE 636 W/ 250 OVERRIDE (IP): Performed by: EMERGENCY MEDICINE

## 2019-06-09 PROCEDURE — 99285 EMERGENCY DEPT VISIT HI MDM: CPT

## 2019-06-09 PROCEDURE — 71045 X-RAY EXAM CHEST 1 VIEW: CPT

## 2019-06-09 PROCEDURE — 80053 COMPREHEN METABOLIC PANEL: CPT

## 2019-06-09 RX ORDER — METHYLPREDNISOLONE 4 MG/1
TABLET ORAL
Qty: 1 KIT | Refills: 0 | Status: SHIPPED | OUTPATIENT
Start: 2019-06-09 | End: 2019-12-01

## 2019-06-09 RX ORDER — METHYLPREDNISOLONE 4 MG/1
TABLET ORAL
Qty: 1 KIT | Refills: 0 | Status: SHIPPED | OUTPATIENT
Start: 2019-06-09 | End: 2019-06-09

## 2019-06-09 RX ORDER — PREDNISONE 20 MG/1
60 TABLET ORAL ONCE
Status: COMPLETED | OUTPATIENT
Start: 2019-06-09 | End: 2019-06-09

## 2019-06-09 RX ADMIN — PREDNISONE 60 MG: 20 TABLET ORAL at 00:48

## 2019-06-09 RX ADMIN — IOHEXOL 60 ML: 350 INJECTION, SOLUTION INTRAVENOUS at 03:41

## 2019-06-09 NOTE — ED TRIAGE NOTES
Chief Complaint   Patient presents with   • Shortness of Breath   • Cough       Pt bib ems from home where pt reports sob and dry cough x a few days. Pt has copd, reports feeling much better after albuterol and duoneb from ems.   Pt wears 2L NC at night baseline. Hypertensive pta, 184/90, tachy in 100's.    Pt arrives with RA sat of 95%. VSS. ERP to see.

## 2019-06-09 NOTE — ED PROVIDER NOTES
ED Provider Note    Scribed for Salomón Reynoso M.D. by Christina Ochoa. 6/9/2019, 12:21 AM.    Primary care provider: Robert Lindo M.D.  Means of arrival: Ambulance  History obtained from: Patient  History limited by: None    CHIEF COMPLAINT  Chief Complaint   Patient presents with   • Shortness of Breath   • Cough       HPI  Fredy Gonsalez Jr. is a 73 y.o. male with a history of COPD who presents to the Emergency Department for evaluation of shortness of breath onset 2-3 days ago that has gradually increased in severity. The endorses associated dry cough with a sore throat, but denies fever. The patient notes his dyspnea is exacerbated with exertion. He reports he is a prescribed an inhaler, which he typically uses once a day secondary to his COPD, but has been using it more frequently with no alleviation. The patient notes he followed up with a clinic on [address] earlier this week for the same complaint, and has been using 2 L of supplemental oxgyen at night since that time. He has prior history of a CABG in December 2017, and currently consults with Dr. Tong (Cardiology). The patient notes he had a stress test done last month, but has not received the results yet. He reports he takes a blood thinning medication as well as 81 mg of Aspirin. The patient states he previously used to smoke a pack a day, but recently stopped smoking cigarettes last week. He has no associated fever at this time.     REVIEW OF SYSTEMS  See HPI for further details. All other systems are negative.     PAST MEDICAL HISTORY   has a past medical history of Arthritis; COPD; EMPHYSEMA; Heart attack (HCC) (12/02/2017); and Hyperlipidemia (4/17/2012).    SURGICAL HISTORY   has a past surgical history that includes open reduction and stent placement (12/02/2017).    SOCIAL HISTORY  Social History   Substance Use Topics   • Smoking status: Current Every Day Smoker     Packs/day: 1.00     Years: 60.00     Types: Cigarettes      "Start date: 6/15/1957   • Smokeless tobacco: Former User     Types: Chew     Quit date: 1957   • Alcohol use No      Comment: quit 28yrs ago      History   Drug Use No     Comment: quit in 1969, THC Hash        FAMILY HISTORY  Family History   Problem Relation Age of Onset   • Arthritis Mother    • Lung Disease Mother    • Osteoporosis Mother    • Alzheimer's Disease Mother    • Other Mother         parkinsons   • Kidney Disease Father    • Cancer Neg Hx    • Diabetes Neg Hx    • Heart Disease Neg Hx    • Stroke Neg Hx        CURRENT MEDICATIONS  Reviewed.  See Encounter Summary.     ALLERGIES  Allergies   Allergen Reactions   • Chantix [Varenicline]      Made patient feel sick.   • Fish Vomiting       PHYSICAL EXAM  VITAL SIGNS: Pulse (!) 104   Temp 36.2 °C (97.2 °F) (Temporal)   Resp 18   Ht 1.727 m (5' 8\")   Wt 72 kg (158 lb 11.7 oz)   SpO2 98%   BMI 24.14 kg/m²   Constitutional: Alert in no apparent distress.  HENT: No signs of trauma, Bilateral external ears normal, Nose normal.   Eyes: Pupils are equal and reactive, Conjunctiva normal, Non-icteric.   Neck: Normal range of motion, No tenderness, Supple, No stridor.   Cardiovascular: Slightly tachycardic rate and regular rhythm, no murmurs.   Thorax & Lungs: Slightly tachypneic, mild expiratory wheezing diffusely, No rales or rhonchi, No chest tenderness.   Abdomen: Bowel sounds normal, Soft, No tenderness, No masses, No pulsatile masses. No peritoneal signs.  Skin: Warm, Dry, No erythema, No rash.   Back: No bony tenderness, No CVA tenderness.   Extremities: Intact distal pulses, No edema, No tenderness, No cyanosis  Musculoskeletal: Good range of motion in all major joints. No tenderness to palpation or major deformities noted.   Neurologic: Alert , Normal motor function, Normal sensory function, No focal deficits noted.   Psychiatric: Affect normal, Judgment normal, Mood normal.     DIAGNOSTIC STUDIES / PROCEDURES     LABS  Results for orders placed or " performed during the hospital encounter of 06/09/19   CBC w/ Differential   Result Value Ref Range    WBC 9.7 4.8 - 10.8 K/uL    RBC 4.48 (L) 4.70 - 6.10 M/uL    Hemoglobin 15.4 14.0 - 18.0 g/dL    Hematocrit 45.3 42.0 - 52.0 %    .1 (H) 81.4 - 97.8 fL    MCH 34.4 (H) 27.0 - 33.0 pg    MCHC 34.0 33.7 - 35.3 g/dL    RDW 50.8 (H) 35.9 - 50.0 fL    Platelet Count 228 164 - 446 K/uL    MPV 9.5 9.0 - 12.9 fL    Neutrophils-Polys 56.60 44.00 - 72.00 %    Lymphocytes 24.80 22.00 - 41.00 %    Monocytes 7.30 0.00 - 13.40 %    Eosinophils 10.10 (H) 0.00 - 6.90 %    Basophils 0.90 0.00 - 1.80 %    Immature Granulocytes 0.30 0.00 - 0.90 %    Nucleated RBC 0.00 /100 WBC    Neutrophils (Absolute) 5.46 1.82 - 7.42 K/uL    Lymphs (Absolute) 2.40 1.00 - 4.80 K/uL    Monos (Absolute) 0.71 0.00 - 0.85 K/uL    Eos (Absolute) 0.98 (H) 0.00 - 0.51 K/uL    Baso (Absolute) 0.09 0.00 - 0.12 K/uL    Immature Granulocytes (abs) 0.03 0.00 - 0.11 K/uL    NRBC (Absolute) 0.00 K/uL   Complete Metabolic Panel (CMP)   Result Value Ref Range    Sodium 141 135 - 145 mmol/L    Potassium 4.3 3.6 - 5.5 mmol/L    Chloride 108 96 - 112 mmol/L    Co2 22 20 - 33 mmol/L    Anion Gap 11.0 0.0 - 11.9    Glucose 126 (H) 65 - 99 mg/dL    Bun 21 8 - 22 mg/dL    Creatinine 1.48 (H) 0.50 - 1.40 mg/dL    Calcium 9.5 8.5 - 10.5 mg/dL    AST(SGOT) 12 12 - 45 U/L    ALT(SGPT) 8 2 - 50 U/L    Alkaline Phosphatase 62 30 - 99 U/L    Total Bilirubin 0.5 0.1 - 1.5 mg/dL    Albumin 4.2 3.2 - 4.9 g/dL    Total Protein 7.2 6.0 - 8.2 g/dL    Globulin 3.0 1.9 - 3.5 g/dL    A-G Ratio 1.4 g/dL   Troponin STAT   Result Value Ref Range    Troponin I <0.01 0.00 - 0.04 ng/mL   D-Dimer (only helpful in low pre-test probability wells critieria. Do not order if patient ruled out by PERC criteria. See Weblinks at top of Labs section)   Result Value Ref Range    D-Dimer Screen 1.51 (H) 0.00 - 0.50 ug/mL (FEU)   ESTIMATED GFR   Result Value Ref Range    GFR If  57 (A)  >60 mL/min/1.73 m 2    GFR If Non  47 (A) >60 mL/min/1.73 m 2   EKG   Result Value Ref Range    Report       [facility] Emergency Dept.    Test Date:  2019  Pt Name:    TOSHIA VERGARA             Department: ER  MRN:        [id number]                      Room:       RD 02  Gender:     Male                         Technician: [id number]  :        1945                   Requested By:SALOMÓN HEAD  Order #:    [id number]                    Reading MD: Salomón Head    Measurements  Intervals                                Axis  Rate:       93                           P:          36  SC:         144                          QRS:        -18  QRSD:       106                          T:          -20  QT:         348  QTc:        433    Interpretive Statements  SINUS RHYTHM  ATRIAL PREMATURE COMPLEX  INCOMPLETE RIGHT BUNDLE BRANCH BLOCK  INFERIOR INFARCT, AGE INDETERMINATE  Compared to ECG 10/09/2018 09:45:35  Atrial premature complex(es) now present  Incomplete right bundle-branch block now present  Myocardial infarct finding still present    Electronically Signed On 2019 4:39:14 PDT by Salomón Head       All labs were reviewed by me.    EKG  12 Lead EKG interpreted by me to show:  Sinus rhythm  Rate 93  Axis: Leftward  Non specific ST changes  Incomplete right bundle branch block     RADIOLOGY  CT-CTA CHEST PULMONARY ARTERY W/ RECONS   Final Result         1.  No pulmonary embolus appreciated.   2.  Atherosclerosis and atherosclerotic coronary artery disease.   3.  Emphysema      DX-CHEST-PORTABLE (1 VIEW)   Final Result         1.  Hazy interstitial pulmonary parenchymal prominence, appearance suggests interstitial edema or infiltrates.   2.  Atherosclerosis   3.  Perihilar interstitial prominence and bronchial wall cuffing, appearance suggests changes of underlying bronchial inflammation, consider bronchitis.        The radiologist's interpretation of all radiological  studies and images have been reviewed by me.    COURSE & MEDICAL DECISION MAKING  Pertinent Labs & Imaging studies reviewed. (See chart for details)    12:21 AM - Patient seen and examined at bedside. Patient will be treated with Deltasone 60 mg. Ordered DX-Chest (1 view), CBC with diff, CMP, Troponin STAT, D-Dimer, and EKG to evaluate his symptoms.     2:27 AM - Reviewed patient's lab results at this time which indicate an elevated D-Dimer. Ordered CT-CTA Chest Pulmonary Artery w/ for further evaluation.     4:28 AM - Patient reevaluated at bedside. He is resting comfortably in bed with stable vital signs. The patient was updated with CT results that show no signs of PE. He was made aware he will be discharged with a prescription for a medrol dosepak. The patient was informed his shortness of breath was likely secondary to COPD exacerbation. He is understanding and agreeable to discharge.     Decision Making:  This is a 73 y.o. year old male who presents with above complaint.  Troponin is negative.  D-dimer elevated leading to CT imaging which did not show any acute other pulmonary issues nor pulmonary emboli.  At this point I believe the patient symptom otology is largely secondary to COPD exacerbation.  He states that he has not smoked for approximately a week and I have encouraged him to continue staying quit at this point.  I have started him on steroids but I do not believe that he meets criteria for antibiotics at this point.  He is understanding return precautions outpatient follow-up with his primary care physician.    The patient will return for new or worsening symptoms and is stable at the time of discharge.    DISPOSITION:  Patient will be discharged home in stable condition.    FOLLOW UP:  Robert Lindo M.D.  [address] [place] [id number]  [phone]            OUTPATIENT MEDICATIONS:  Current Discharge Medication List      START taking these medications    Details   MethylPREDNISolone  (MEDROL DOSEPAK) 4 MG Tablet Therapy Pack Use as directed  Qty: 1 Kit, Refills: 0             FINAL IMPRESSION  1. Acute exacerbation of chronic obstructive pulmonary disease (COPD) ([facility])          Christina KEYES (Scribe), am scribing for, and in the presence of, Salomón Reynoso M.D..    Electronically signed by: Christina Ochoa (Scribe), 6/9/2019    Salomón KEYES M.D. personally performed the services described in this documentation, as scribed by Christina Ochoa in my presence, and it is both accurate and complete.    C.    The note accurately reflects work and decisions made by me.  Salomón Reynoso  6/9/2019  7:01 AM

## 2019-06-09 NOTE — ED NOTES
Break RN note: Pt resting quietly watching TV, NAD. Pt comfort level checked, denies needs. Updated on POC. Awaiting lab result. Call light in reach.

## 2019-06-19 ENCOUNTER — HOSPITAL ENCOUNTER (OUTPATIENT)
Dept: LAB | Facility: MEDICAL CENTER | Age: 74
End: 2019-06-19
Attending: FAMILY MEDICINE
Payer: MEDICARE

## 2019-06-19 DIAGNOSIS — N18.30 CHRONIC RENAL FAILURE, STAGE 3 (MODERATE) (HCC): ICD-10-CM

## 2019-06-19 LAB
ANION GAP SERPL CALC-SCNC: 6 MMOL/L (ref 0–11.9)
BUN SERPL-MCNC: 21 MG/DL (ref 8–22)
CALCIUM SERPL-MCNC: 9.1 MG/DL (ref 8.5–10.5)
CHLORIDE SERPL-SCNC: 107 MMOL/L (ref 96–112)
CO2 SERPL-SCNC: 27 MMOL/L (ref 20–33)
CREAT SERPL-MCNC: 1.3 MG/DL (ref 0.5–1.4)
FASTING STATUS PATIENT QL REPORTED: NORMAL
GLUCOSE SERPL-MCNC: 111 MG/DL (ref 65–99)
POTASSIUM SERPL-SCNC: 4.4 MMOL/L (ref 3.6–5.5)
SODIUM SERPL-SCNC: 140 MMOL/L (ref 135–145)

## 2019-06-19 PROCEDURE — 80048 BASIC METABOLIC PNL TOTAL CA: CPT

## 2019-06-19 PROCEDURE — 36415 COLL VENOUS BLD VENIPUNCTURE: CPT

## 2019-06-25 ENCOUNTER — OFFICE VISIT (OUTPATIENT)
Dept: MEDICAL GROUP | Facility: MEDICAL CENTER | Age: 74
End: 2019-06-25
Attending: FAMILY MEDICINE
Payer: MEDICARE

## 2019-06-25 VITALS
BODY MASS INDEX: 25.46 KG/M2 | RESPIRATION RATE: 16 BRPM | WEIGHT: 168 LBS | HEART RATE: 72 BPM | TEMPERATURE: 97.3 F | HEIGHT: 68 IN | DIASTOLIC BLOOD PRESSURE: 68 MMHG | SYSTOLIC BLOOD PRESSURE: 104 MMHG | OXYGEN SATURATION: 93 %

## 2019-06-25 DIAGNOSIS — M48.061 FORAMINAL STENOSIS OF LUMBAR REGION: ICD-10-CM

## 2019-06-25 DIAGNOSIS — J43.1 PANLOBULAR EMPHYSEMA (HCC): ICD-10-CM

## 2019-06-25 DIAGNOSIS — F11.20 OPIOID TYPE DEPENDENCE, CONTINUOUS (HCC): ICD-10-CM

## 2019-06-25 DIAGNOSIS — M19.90 ARTHRITIS: ICD-10-CM

## 2019-06-25 DIAGNOSIS — Z12.11 SCREENING FOR MALIGNANT NEOPLASM OF COLON: ICD-10-CM

## 2019-06-25 DIAGNOSIS — N18.30 CHRONIC RENAL FAILURE, STAGE 3 (MODERATE) (HCC): ICD-10-CM

## 2019-06-25 DIAGNOSIS — Z72.0 TOBACCO ABUSE: ICD-10-CM

## 2019-06-25 PROCEDURE — 99214 OFFICE O/P EST MOD 30 MIN: CPT | Performed by: FAMILY MEDICINE

## 2019-06-25 RX ORDER — HYDROCODONE BITARTRATE AND ACETAMINOPHEN 5; 325 MG/1; MG/1
1 TABLET ORAL EVERY 8 HOURS PRN
Qty: 90 TAB | Refills: 0 | Status: SHIPPED | OUTPATIENT
Start: 2019-08-03 | End: 2019-08-26 | Stop reason: SDUPTHER

## 2019-06-25 RX ORDER — HYDROCODONE BITARTRATE AND ACETAMINOPHEN 5; 325 MG/1; MG/1
1 TABLET ORAL EVERY 8 HOURS PRN
Qty: 90 TAB | Refills: 0 | Status: SHIPPED | OUTPATIENT
Start: 2019-07-03 | End: 2019-06-25 | Stop reason: SDUPTHER

## 2019-06-25 NOTE — ASSESSMENT & PLAN NOTE
Patient had himself transported to [facility] on 6/9/2019 for significant dyspnea that have been present for over 24 hours.  Evaluation showed no cardiac decompensation and it was ultimately attributed to an exacerbation of his COPD.  He was just given a 2-day oral steroid course which allowed him to return to his normal baseline function.  He reports no current wheezing.  Is using his Spiriva.  Previous trial of Symbicort in March did not make any significant improvement.  He also has his rescue Proventil inhaler.  Not currently reporting any fever.  Reports that he walks significantly unless he gets too hot and then he stays at his air conditioned room

## 2019-06-25 NOTE — ASSESSMENT & PLAN NOTE
Patient continues to manage his left sided low back pain with sciatica utilizing walking and Norco 5 mg 3 times daily.  Pain does radiate down to the left mid thigh fairly consistently.  He is not reporting any urinary or fecal incontinence.

## 2019-06-25 NOTE — PROGRESS NOTES
Chief Complaint   Patient presents with   • Arthritis       HISTORY OF PRESENT ILLNESS: Patient is a 73 y.o. male established patient who presents today to follow-up on the problems below        Chronic renal failure, stage 3 (moderate) ([facility])  Patient reports variable success with generous daily fluid intake.  Creatinine was still moderately elevated during an emergency room visit for acute exacerbation of COPD about 2 weeks ago.  However last week on his pre-office visit labs creatinine had fallen to 1.35 with an estimated GFR of 54 which is an improvement.  Patient reports no hematuria or oliguria or dysuria.  Not reporting any leg edema    COPD (chronic obstructive pulmonary disease) ([facility])  Patient had himself transported to [facility] on 6/9/2019 for significant dyspnea that have been present for over 24 hours.  Evaluation showed no cardiac decompensation and it was ultimately attributed to an exacerbation of his COPD.  He was just given a 2-day oral steroid course which allowed him to return to his normal baseline function.  He reports no current wheezing.  Is using his Spiriva.  Previous trial of Symbicort in March did not make any significant improvement.  He also has his rescue Proventil inhaler.  Not currently reporting any fever.  Reports that he walks significantly unless he gets too hot and then he stays at his air conditioned room    Opioid type dependence, continuous  Patient continues to manage his left sided low back pain with sciatica utilizing walking and Norco 5 mg 3 times daily.  Pain does radiate down to the left mid thigh fairly consistently.  He is not reporting any urinary or fecal incontinence.    Social history-single, retired  Patient Active Problem List    Diagnosis Date Noted   • Nocturnal hypoxia 04/16/2019   • Chronic renal failure, stage 3 (moderate) ([facility]) 04/12/2019   • Dyslipidemia 04/11/2019   • Atypical chest pain 04/11/2019   • S/P CABG (coronary artery bypass graft) 04/11/2019   •  PVD (peripheral vascular disease) ([facility]) 10/09/2018   • Atherosclerosis of native arteries of extremity with intermittent claudication ([facility]) 10/09/2018   • Femoral bruit 10/09/2018   • History of myocardial infarction 10/09/2018   • Coronary artery disease, occlusive 10/09/2018   • S/P coronary artery stent placement 10/09/2018   • Coronary artery disease involving native coronary artery of native heart without angina pectoris 04/30/2018   • Bilateral hip pain 02/16/2017   • Chronic lumbar pain 07/21/2016   • IFG (impaired fasting glucose) 03/18/2016   • Opioid type dependence, continuous ([facility]) 06/25/2015   • Dental caries 03/17/2015   • Back pain 03/17/2015   • Hyperlipidemia 04/17/2012   • COPD (chronic obstructive pulmonary disease) ([facility]) 04/17/2012   • AMIRA (obstructive sleep apnea) 04/17/2012   • Arthritis 11/11/2011   • Tobacco abuse 11/11/2011       Allergies:Chantix [varenicline] and Fish    Current Outpatient Prescriptions   Medication Sig Dispense Refill   • [START ON 8/3/2019] HYDROcodone-acetaminophen (NORCO) 5-325 MG Tab per tablet Take 1 Tab by mouth every 8 hours as needed for up to 30 days. Prescription must last 30 days. 90 Tab 0   • MethylPREDNISolone (MEDROL DOSEPAK) 4 MG Tablet Therapy Pack Use as directed 1 Kit 0   • atorvastatin (LIPITOR) 40 MG Tab Take 1 Tab by mouth every day. 30 Tab 11   • metoprolol SR (TOPROL XL) 50 MG TABLET SR 24 HR Take 1 Tab by mouth every day. 30 Tab 11   • clopidogrel (PLAVIX) 75 MG Tab Take 1 Tab by mouth every day. 30 Tab 11   • lisinopril (PRINIVIL) 5 MG Tab Take 1 Tab by mouth every day. 30 Tab 11   • budesonide-formoterol (SYMBICORT) 160-4.5 MCG/ACT Aerosol Inhale 2 Puffs by mouth 2 Times a Day. 2 Inhaler 0   • Tiotropium Bromide Monohydrate (SPIRIVA RESPIMAT) 2.5 MCG/ACT Aero Soln Inhale 2 Inhalation by mouth every day. Assemble and prime. 2 Inhaler 0   • nitroglycerin (NITROSTAT) 0.4 MG SL Tab ONE TABLET UNDER TONGUE AS NEEDED FOR CHEST PAIN 25 Tab 0   • aspirin  "EC (ECOTRIN) 81 MG Tablet Delayed Response Take 81 mg by mouth every day.     • albuterol 108 (90 BASE) MCG/ACT Aero Soln inhalation aerosol Inhale 2 Puffs by mouth every 6 hours as needed for Shortness of Breath. 8.5 g 11   • albuterol (PROAIR HFA) 108 (90 BASE) MCG/ACT Aero Soln inhalation aerosol Inhale 2 Puffs by mouth every 6 hours as needed. 8.5 g 3     No current facility-administered medications for this visit.        Social History   Substance Use Topics   • Smoking status: Current Every Day Smoker     Packs/day: 1.00     Years: 60.00     Types: Cigarettes     Start date: 6/15/1957   • Smokeless tobacco: Former User     Types: Chew     Quit date: 1957   • Alcohol use No      Comment: quit 28yrs ago       Family History   Problem Relation Age of Onset   • Arthritis Mother    • Lung Disease Mother    • Osteoporosis Mother    • Alzheimer's Disease Mother    • Other Mother         parkinsons   • Kidney Disease Father    • Cancer Neg Hx    • Diabetes Neg Hx    • Heart Disease Neg Hx    • Stroke Neg Hx        ROS:  Review of Systems   Constitutional: Negative for fever, chills, weight loss and malaise/fatigue.   Eyes: Negative for blurred vision.   Respiratory: Negative for cough, sputum production   Cardiovascular: Negative for chest pain, palpitations, orthopnea and leg swelling.   Gastrointestinal: Negative for heartburn, nausea, vomiting and abdominal pain.   Endo/Heme/Allergies: Does not bruise/bleed easily.                Exam:  /68 (BP Location: Left arm, Patient Position: Sitting, BP Cuff Size: Adult)   Pulse 72   Temp 36.3 °C (97.3 °F) (Temporal)   Resp 16   Ht 1.727 m (5' 7.99\")   Wt 76.2 kg (168 lb)   SpO2 93%   General:  Well nourished, well developed male in NAD  Head is grossly normal.  Neck: Supple without JVD or bruit. Thyroid is not enlarged. Trachea is midline.  Pulmonary: Clear to ausculation .  Normal effort. No rales, ronchi, or wheezing.  Cardiovascular: Regular rate and rhythm " without murmur.  Abdomen-Abdomen is soft, normal bowel sounds, no masses, guarding, ororganomegaly, or tenderness.  Lower extremities- neg for pretibial edema, redness, tenderness.      Please note that this dictation was created using voice recognition software. I have made every reasonable attempt to correct obvious errors, but I expect that there are errors of grammar and possibly content that I did not discover before finalizing the note.    Assessment/Plan:  1. Screening for malignant neoplasm of colon  OCCULT BLOOD FECES IMMUNOASSAY   2. Tobacco abuse     3. Panlobular emphysema (HCC)     4. Chronic renal failure, stage 3 (moderate) ([facility])     5. Arthritis  HYDROcodone-acetaminophen (NORCO) 5-325 MG Tab per tablet    DISCONTINUED: HYDROcodone-acetaminophen (NORCO) 5-325 MG Tab per tablet   6. Foraminal stenosis of lumbar region  HYDROcodone-acetaminophen (NORCO) 5-325 MG Tab per tablet    DISCONTINUED: HYDROcodone-acetaminophen (NORCO) 5-325 MG Tab per tablet   7. Opioid type dependence, continuous (HCC)       Plan: 1.  Will repeat BMP in 2 months with visit at that time  2.  Norco refilled at 5 mg 3 times daily for the next 2 months  3.  Patient strongly encouraged to again start to reduce number of cigarettes consumed each day  4.  Patient also encouraged to take in a generous daily fluid intake

## 2019-06-25 NOTE — ASSESSMENT & PLAN NOTE
Patient reports variable success with generous daily fluid intake.  Creatinine was still moderately elevated during an emergency room visit for acute exacerbation of COPD about 2 weeks ago.  However last week on his pre-office visit labs creatinine had fallen to 1.35 with an estimated GFR of 54 which is an improvement.  Patient reports no hematuria or oliguria or dysuria.  Not reporting any leg edema

## 2019-06-26 ENCOUNTER — HOSPITAL ENCOUNTER (OUTPATIENT)
Facility: MEDICAL CENTER | Age: 74
End: 2019-06-26
Attending: FAMILY MEDICINE
Payer: MEDICARE

## 2019-06-26 PROCEDURE — 82274 ASSAY TEST FOR BLOOD FECAL: CPT

## 2019-06-28 DIAGNOSIS — Z12.11 SCREENING FOR MALIGNANT NEOPLASM OF COLON: ICD-10-CM

## 2019-06-28 LAB
AMBIGUOUS DTTM AMBI4: NORMAL
HEMOCCULT STL QL IA: NEGATIVE

## 2019-07-02 ENCOUNTER — TELEPHONE (OUTPATIENT)
Dept: MEDICAL GROUP | Facility: MEDICAL CENTER | Age: 74
End: 2019-07-02

## 2019-07-02 NOTE — TELEPHONE ENCOUNTER
----- Message from Robert Lindo M.D. sent at 7/1/2019  7:54 AM PDT -----  Stool check to detect blood consistent with colon cancer is negative.  Please repeat in 1 year

## 2019-07-02 NOTE — TELEPHONE ENCOUNTER
Pt informed   Add 57888 Cpt? (Important Note: In 2017 The Use Of 53509 Is Being Tracked By Cms To Determine Future Global Period Reimbursement For Global Periods): yes Detail Level: Detailed

## 2019-08-21 ENCOUNTER — HOSPITAL ENCOUNTER (OUTPATIENT)
Dept: LAB | Facility: MEDICAL CENTER | Age: 74
End: 2019-08-21
Attending: FAMILY MEDICINE
Payer: MEDICARE

## 2019-08-21 DIAGNOSIS — N18.30 CHRONIC RENAL FAILURE, STAGE 3 (MODERATE) (HCC): ICD-10-CM

## 2019-08-21 DIAGNOSIS — D75.89 MACROCYTOSIS WITHOUT ANEMIA: ICD-10-CM

## 2019-08-21 LAB
ANION GAP SERPL CALC-SCNC: 10 MMOL/L (ref 0–11.9)
BUN SERPL-MCNC: 19 MG/DL (ref 8–22)
CALCIUM SERPL-MCNC: 9.5 MG/DL (ref 8.5–10.5)
CHLORIDE SERPL-SCNC: 107 MMOL/L (ref 96–112)
CO2 SERPL-SCNC: 25 MMOL/L (ref 20–33)
CREAT SERPL-MCNC: 1.34 MG/DL (ref 0.5–1.4)
FASTING STATUS PATIENT QL REPORTED: NORMAL
FOLATE SERPL-MCNC: 12 NG/ML
GLUCOSE SERPL-MCNC: 112 MG/DL (ref 65–99)
POTASSIUM SERPL-SCNC: 4.6 MMOL/L (ref 3.6–5.5)
SODIUM SERPL-SCNC: 142 MMOL/L (ref 135–145)
VIT B12 SERPL-MCNC: 372 PG/ML (ref 211–911)

## 2019-08-21 PROCEDURE — 82607 VITAMIN B-12: CPT

## 2019-08-21 PROCEDURE — 80048 BASIC METABOLIC PNL TOTAL CA: CPT

## 2019-08-21 PROCEDURE — 82746 ASSAY OF FOLIC ACID SERUM: CPT

## 2019-08-21 PROCEDURE — 36415 COLL VENOUS BLD VENIPUNCTURE: CPT

## 2019-08-26 ENCOUNTER — OFFICE VISIT (OUTPATIENT)
Dept: MEDICAL GROUP | Facility: MEDICAL CENTER | Age: 74
End: 2019-08-26
Attending: FAMILY MEDICINE
Payer: MEDICARE

## 2019-08-26 VITALS
BODY MASS INDEX: 25.05 KG/M2 | WEIGHT: 165.3 LBS | DIASTOLIC BLOOD PRESSURE: 56 MMHG | SYSTOLIC BLOOD PRESSURE: 104 MMHG | HEIGHT: 68 IN | HEART RATE: 72 BPM | OXYGEN SATURATION: 98 % | TEMPERATURE: 97.2 F | RESPIRATION RATE: 16 BRPM

## 2019-08-26 DIAGNOSIS — M19.90 ARTHRITIS: ICD-10-CM

## 2019-08-26 DIAGNOSIS — N18.30 CHRONIC RENAL FAILURE, STAGE 3 (MODERATE) (HCC): ICD-10-CM

## 2019-08-26 DIAGNOSIS — M48.061 FORAMINAL STENOSIS OF LUMBAR REGION: ICD-10-CM

## 2019-08-26 PROCEDURE — 99213 OFFICE O/P EST LOW 20 MIN: CPT | Performed by: FAMILY MEDICINE

## 2019-08-26 RX ORDER — HYDROCODONE BITARTRATE AND ACETAMINOPHEN 5; 325 MG/1; MG/1
1 TABLET ORAL EVERY 8 HOURS PRN
Qty: 90 TAB | Refills: 0 | Status: SHIPPED | OUTPATIENT
Start: 2019-08-26 | End: 2019-08-26 | Stop reason: SDUPTHER

## 2019-08-26 RX ORDER — HYDROCODONE BITARTRATE AND ACETAMINOPHEN 5; 325 MG/1; MG/1
1 TABLET ORAL EVERY 8 HOURS PRN
Qty: 90 TAB | Refills: 0 | Status: SHIPPED | OUTPATIENT
Start: 2019-10-26 | End: 2019-10-08

## 2019-08-26 RX ORDER — HYDROCODONE BITARTRATE AND ACETAMINOPHEN 5; 325 MG/1; MG/1
1 TABLET ORAL EVERY 8 HOURS PRN
Qty: 90 TAB | Refills: 0 | Status: SHIPPED | OUTPATIENT
Start: 2019-09-26 | End: 2019-08-26 | Stop reason: SDUPTHER

## 2019-08-26 NOTE — PROGRESS NOTES
"Subjective:      Fredy Gonsalez Jr. is a 73 y.o. male who presents with No chief complaint on file.            HPI 1.  Chronic renal failure-patient reports he has continued to be faithful taking in a generous daily fluid intake.  Not reporting any painful urination or bloody urine.  New creatinine is approximately stable at 1.34 improved from earlier this year.  2.  Chronic back and hip pain-patient continues to be stable and his reliance on Norco 5 mg 3 times daily.  Still notices pain with walking more than about 2 blocks which requires him to stop and rest.    ROS negative for constipation, confusion, leg edema.       Objective:     /56 (BP Location: Left arm, Patient Position: Sitting, BP Cuff Size: Adult)   Pulse 72   Temp 36.2 °C (97.2 °F)   Resp 16   Ht 1.727 m (5' 7.99\")   Wt 75 kg (165 lb 4.8 oz)   SpO2 98%   BMI 25.14 kg/m²      Physical Exam  Gen.- alert, cooperative, in no acute distress  Neck- midline trachea, thyroid not enlarged or tender,supple, no cervical adenopathy  Chest-clear to auscultation and percussion with normal breath sounds. No retractions. Chest wall nontender  Cardiac- regular rhythm and rate. No murmur, thrill, or heave            Assessment/Plan:     1. Arthritis    - HYDROcodone-acetaminophen (NORCO) 5-325 MG Tab per tablet; Take 1 Tab by mouth every 8 hours as needed for up to 30 days. Prescription must last 30 days.  Dispense: 90 Tab; Refill: 0    2. Foraminal stenosis of lumbar region    - HYDROcodone-acetaminophen (NORCO) 5-325 MG Tab per tablet; Take 1 Tab by mouth every 8 hours as needed for up to 30 days. Prescription must last 30 days.  Dispense: 90 Tab; Refill: 0    3. Chronic renal failure, stage 3 (moderate) ([facility])    Plan: 1.  Repeat BMP in 3 months.  2.  Renew Norco x3 months  3.  Revisit in 3 months    "

## 2019-09-10 ENCOUNTER — APPOINTMENT (OUTPATIENT)
Dept: RADIOLOGY | Facility: MEDICAL CENTER | Age: 74
End: 2019-09-10
Attending: EMERGENCY MEDICINE
Payer: MEDICARE

## 2019-09-10 ENCOUNTER — HOSPITAL ENCOUNTER (OUTPATIENT)
Facility: MEDICAL CENTER | Age: 74
End: 2019-09-11
Attending: EMERGENCY MEDICINE | Admitting: HOSPITALIST
Payer: MEDICARE

## 2019-09-10 PROCEDURE — 93005 ELECTROCARDIOGRAM TRACING: CPT | Performed by: EMERGENCY MEDICINE

## 2019-09-10 PROCEDURE — 71045 X-RAY EXAM CHEST 1 VIEW: CPT

## 2019-09-10 PROCEDURE — 80053 COMPREHEN METABOLIC PANEL: CPT

## 2019-09-10 PROCEDURE — 93005 ELECTROCARDIOGRAM TRACING: CPT

## 2019-09-10 PROCEDURE — 84484 ASSAY OF TROPONIN QUANT: CPT

## 2019-09-10 PROCEDURE — 85025 COMPLETE CBC W/AUTO DIFF WBC: CPT

## 2019-09-10 PROCEDURE — 99285 EMERGENCY DEPT VISIT HI MDM: CPT

## 2019-09-11 VITALS
OXYGEN SATURATION: 96 % | TEMPERATURE: 98.1 F | WEIGHT: 160.94 LBS | HEIGHT: 67 IN | SYSTOLIC BLOOD PRESSURE: 107 MMHG | BODY MASS INDEX: 25.26 KG/M2 | HEART RATE: 83 BPM | RESPIRATION RATE: 16 BRPM | DIASTOLIC BLOOD PRESSURE: 57 MMHG

## 2019-09-11 PROBLEM — R07.2 PRECORDIAL PAIN: Status: ACTIVE | Noted: 2019-04-11

## 2019-09-11 PROBLEM — R07.9 PAIN IN THE CHEST: Status: ACTIVE | Noted: 2019-04-11

## 2019-09-11 LAB
25(OH)D3 SERPL-MCNC: 27 NG/ML (ref 30–100)
ALBUMIN SERPL BCP-MCNC: 3.6 G/DL (ref 3.2–4.9)
ALBUMIN SERPL BCP-MCNC: 4.1 G/DL (ref 3.2–4.9)
ALBUMIN/GLOB SERPL: 1.3 G/DL
ALBUMIN/GLOB SERPL: 1.4 G/DL
ALP SERPL-CCNC: 52 U/L (ref 30–99)
ALP SERPL-CCNC: 59 U/L (ref 30–99)
ALT SERPL-CCNC: 11 U/L (ref 2–50)
ALT SERPL-CCNC: 7 U/L (ref 2–50)
ANION GAP SERPL CALC-SCNC: 11 MMOL/L (ref 0–11.9)
ANION GAP SERPL CALC-SCNC: 8 MMOL/L (ref 0–11.9)
AST SERPL-CCNC: 10 U/L (ref 12–45)
AST SERPL-CCNC: 13 U/L (ref 12–45)
BASOPHILS # BLD AUTO: 0.5 % (ref 0–1.8)
BASOPHILS # BLD AUTO: 0.7 % (ref 0–1.8)
BASOPHILS # BLD: 0.06 K/UL (ref 0–0.12)
BASOPHILS # BLD: 0.06 K/UL (ref 0–0.12)
BILIRUB SERPL-MCNC: 0.5 MG/DL (ref 0.1–1.5)
BILIRUB SERPL-MCNC: 0.6 MG/DL (ref 0.1–1.5)
BUN SERPL-MCNC: 18 MG/DL (ref 8–22)
BUN SERPL-MCNC: 19 MG/DL (ref 8–22)
CALCIUM SERPL-MCNC: 8.5 MG/DL (ref 8.5–10.5)
CALCIUM SERPL-MCNC: 9.1 MG/DL (ref 8.5–10.5)
CHLORIDE SERPL-SCNC: 107 MMOL/L (ref 96–112)
CHLORIDE SERPL-SCNC: 112 MMOL/L (ref 96–112)
CHOLEST SERPL-MCNC: 110 MG/DL (ref 100–199)
CO2 SERPL-SCNC: 19 MMOL/L (ref 20–33)
CO2 SERPL-SCNC: 22 MMOL/L (ref 20–33)
CREAT SERPL-MCNC: 1.12 MG/DL (ref 0.5–1.4)
CREAT SERPL-MCNC: 1.23 MG/DL (ref 0.5–1.4)
EKG IMPRESSION: NORMAL
EOSINOPHIL # BLD AUTO: 1.02 K/UL (ref 0–0.51)
EOSINOPHIL # BLD AUTO: 1.24 K/UL (ref 0–0.51)
EOSINOPHIL NFR BLD: 10.8 % (ref 0–6.9)
EOSINOPHIL NFR BLD: 12.4 % (ref 0–6.9)
ERYTHROCYTE [DISTWIDTH] IN BLOOD BY AUTOMATED COUNT: 48 FL (ref 35.9–50)
ERYTHROCYTE [DISTWIDTH] IN BLOOD BY AUTOMATED COUNT: 49.6 FL (ref 35.9–50)
FERRITIN SERPL-MCNC: 32.7 NG/ML (ref 22–322)
GLOBULIN SER CALC-MCNC: 2.5 G/DL (ref 1.9–3.5)
GLOBULIN SER CALC-MCNC: 3.1 G/DL (ref 1.9–3.5)
GLUCOSE SERPL-MCNC: 102 MG/DL (ref 65–99)
GLUCOSE SERPL-MCNC: 104 MG/DL (ref 65–99)
HCT VFR BLD AUTO: 41 % (ref 42–52)
HCT VFR BLD AUTO: 41.9 % (ref 42–52)
HDLC SERPL-MCNC: 38 MG/DL
HGB BLD-MCNC: 13.7 G/DL (ref 14–18)
HGB BLD-MCNC: 13.8 G/DL (ref 14–18)
HGB RETIC QN AUTO: 36.8 PG/CELL (ref 29–35)
IMM GRANULOCYTES # BLD AUTO: 0.02 K/UL (ref 0–0.11)
IMM GRANULOCYTES # BLD AUTO: 0.03 K/UL (ref 0–0.11)
IMM GRANULOCYTES NFR BLD AUTO: 0.2 % (ref 0–0.9)
IMM GRANULOCYTES NFR BLD AUTO: 0.3 % (ref 0–0.9)
IMM RETICS NFR: 10.5 % (ref 9.3–17.4)
IRON SATN MFR SERPL: 54 % (ref 15–55)
IRON SERPL-MCNC: 120 UG/DL (ref 50–180)
LDLC SERPL CALC-MCNC: 61 MG/DL
LYMPHOCYTES # BLD AUTO: 1.74 K/UL (ref 1–4.8)
LYMPHOCYTES # BLD AUTO: 2.45 K/UL (ref 1–4.8)
LYMPHOCYTES NFR BLD: 15.2 % (ref 22–41)
LYMPHOCYTES NFR BLD: 29.7 % (ref 22–41)
MAGNESIUM SERPL-MCNC: 1.8 MG/DL (ref 1.5–2.5)
MCH RBC QN AUTO: 33.8 PG (ref 27–33)
MCH RBC QN AUTO: 34.3 PG (ref 27–33)
MCHC RBC AUTO-ENTMCNC: 32.7 G/DL (ref 33.7–35.3)
MCHC RBC AUTO-ENTMCNC: 33.7 G/DL (ref 33.7–35.3)
MCV RBC AUTO: 102 FL (ref 81.4–97.8)
MCV RBC AUTO: 103.5 FL (ref 81.4–97.8)
MONOCYTES # BLD AUTO: 0.81 K/UL (ref 0–0.85)
MONOCYTES # BLD AUTO: 0.82 K/UL (ref 0–0.85)
MONOCYTES NFR BLD AUTO: 7.1 % (ref 0–13.4)
MONOCYTES NFR BLD AUTO: 9.8 % (ref 0–13.4)
NEUTROPHILS # BLD AUTO: 3.89 K/UL (ref 1.82–7.42)
NEUTROPHILS # BLD AUTO: 7.59 K/UL (ref 1.82–7.42)
NEUTROPHILS NFR BLD: 47.2 % (ref 44–72)
NEUTROPHILS NFR BLD: 66.1 % (ref 44–72)
NRBC # BLD AUTO: 0 K/UL
NRBC # BLD AUTO: 0 K/UL
NRBC BLD-RTO: 0 /100 WBC
NRBC BLD-RTO: 0 /100 WBC
PLATELET # BLD AUTO: 198 K/UL (ref 164–446)
PLATELET # BLD AUTO: 211 K/UL (ref 164–446)
PMV BLD AUTO: 9.5 FL (ref 9–12.9)
PMV BLD AUTO: 9.5 FL (ref 9–12.9)
POTASSIUM SERPL-SCNC: 4.1 MMOL/L (ref 3.6–5.5)
POTASSIUM SERPL-SCNC: 4.3 MMOL/L (ref 3.6–5.5)
PROT SERPL-MCNC: 6.1 G/DL (ref 6–8.2)
PROT SERPL-MCNC: 7.2 G/DL (ref 6–8.2)
RBC # BLD AUTO: 4.02 M/UL (ref 4.7–6.1)
RBC # BLD AUTO: 4.05 M/UL (ref 4.7–6.1)
RETICS # AUTO: 0.05 M/UL (ref 0.04–0.06)
RETICS/RBC NFR: 1.3 % (ref 0.8–2.1)
SODIUM SERPL-SCNC: 139 MMOL/L (ref 135–145)
SODIUM SERPL-SCNC: 140 MMOL/L (ref 135–145)
TIBC SERPL-MCNC: 223 UG/DL (ref 250–450)
TRIGL SERPL-MCNC: 56 MG/DL (ref 0–149)
TROPONIN T SERPL-MCNC: 13 NG/L (ref 6–19)
TROPONIN T SERPL-MCNC: 14 NG/L (ref 6–19)
TROPONIN T SERPL-MCNC: 16 NG/L (ref 6–19)
TSH SERPL DL<=0.005 MIU/L-ACNC: 1.37 UIU/ML (ref 0.38–5.33)
WBC # BLD AUTO: 11.5 K/UL (ref 4.8–10.8)
WBC # BLD AUTO: 8.3 K/UL (ref 4.8–10.8)

## 2019-09-11 PROCEDURE — 84443 ASSAY THYROID STIM HORMONE: CPT

## 2019-09-11 PROCEDURE — 93005 ELECTROCARDIOGRAM TRACING: CPT | Performed by: STUDENT IN AN ORGANIZED HEALTH CARE EDUCATION/TRAINING PROGRAM

## 2019-09-11 PROCEDURE — 83550 IRON BINDING TEST: CPT

## 2019-09-11 PROCEDURE — 80053 COMPREHEN METABOLIC PANEL: CPT

## 2019-09-11 PROCEDURE — 83735 ASSAY OF MAGNESIUM: CPT

## 2019-09-11 PROCEDURE — G0378 HOSPITAL OBSERVATION PER HR: HCPCS

## 2019-09-11 PROCEDURE — 85046 RETICYTE/HGB CONCENTRATE: CPT

## 2019-09-11 PROCEDURE — 82728 ASSAY OF FERRITIN: CPT

## 2019-09-11 PROCEDURE — A9270 NON-COVERED ITEM OR SERVICE: HCPCS | Performed by: STUDENT IN AN ORGANIZED HEALTH CARE EDUCATION/TRAINING PROGRAM

## 2019-09-11 PROCEDURE — 83540 ASSAY OF IRON: CPT

## 2019-09-11 PROCEDURE — 36415 COLL VENOUS BLD VENIPUNCTURE: CPT

## 2019-09-11 PROCEDURE — 84484 ASSAY OF TROPONIN QUANT: CPT

## 2019-09-11 PROCEDURE — 93010 ELECTROCARDIOGRAM REPORT: CPT | Performed by: INTERNAL MEDICINE

## 2019-09-11 PROCEDURE — 99213 OFFICE O/P EST LOW 20 MIN: CPT | Performed by: INTERNAL MEDICINE

## 2019-09-11 PROCEDURE — 82306 VITAMIN D 25 HYDROXY: CPT

## 2019-09-11 PROCEDURE — 85025 COMPLETE CBC W/AUTO DIFF WBC: CPT

## 2019-09-11 PROCEDURE — 700102 HCHG RX REV CODE 250 W/ 637 OVERRIDE(OP): Performed by: STUDENT IN AN ORGANIZED HEALTH CARE EDUCATION/TRAINING PROGRAM

## 2019-09-11 PROCEDURE — 99220 PR INITIAL OBSERVATION CARE,LEVL III: CPT | Mod: GC | Performed by: HOSPITALIST

## 2019-09-11 PROCEDURE — 80061 LIPID PANEL: CPT

## 2019-09-11 RX ORDER — ATORVASTATIN CALCIUM 20 MG/1
40 TABLET, FILM COATED ORAL DAILY
Status: DISCONTINUED | OUTPATIENT
Start: 2019-09-11 | End: 2019-09-11 | Stop reason: HOSPADM

## 2019-09-11 RX ORDER — METOPROLOL SUCCINATE 50 MG/1
50 TABLET, EXTENDED RELEASE ORAL DAILY
Status: DISCONTINUED | OUTPATIENT
Start: 2019-09-11 | End: 2019-09-11 | Stop reason: HOSPADM

## 2019-09-11 RX ORDER — TIOTROPIUM BROMIDE 18 UG/1
1 CAPSULE ORAL; RESPIRATORY (INHALATION)
Status: DISCONTINUED | OUTPATIENT
Start: 2019-09-11 | End: 2019-09-11

## 2019-09-11 RX ORDER — BUDESONIDE AND FORMOTEROL FUMARATE DIHYDRATE 160; 4.5 UG/1; UG/1
2 AEROSOL RESPIRATORY (INHALATION) 2 TIMES DAILY
Status: DISCONTINUED | OUTPATIENT
Start: 2019-09-11 | End: 2019-09-11 | Stop reason: HOSPADM

## 2019-09-11 RX ORDER — NICOTINE 21 MG/24HR
14 PATCH, TRANSDERMAL 24 HOURS TRANSDERMAL
Status: DISCONTINUED | OUTPATIENT
Start: 2019-09-11 | End: 2019-09-11 | Stop reason: HOSPADM

## 2019-09-11 RX ORDER — LISINOPRIL 10 MG/1
5 TABLET ORAL DAILY
Status: DISCONTINUED | OUTPATIENT
Start: 2019-09-11 | End: 2019-09-11 | Stop reason: HOSPADM

## 2019-09-11 RX ORDER — CLOPIDOGREL BISULFATE 75 MG/1
75 TABLET ORAL DAILY
Status: DISCONTINUED | OUTPATIENT
Start: 2019-09-11 | End: 2019-09-11 | Stop reason: HOSPADM

## 2019-09-11 RX ORDER — ACETAMINOPHEN 325 MG/1
650 TABLET ORAL EVERY 6 HOURS PRN
Status: DISCONTINUED | OUTPATIENT
Start: 2019-09-11 | End: 2019-09-11 | Stop reason: HOSPADM

## 2019-09-11 RX ORDER — TIOTROPIUM BROMIDE 18 UG/1
1 CAPSULE ORAL; RESPIRATORY (INHALATION) DAILY
Status: DISCONTINUED | OUTPATIENT
Start: 2019-09-12 | End: 2019-09-11 | Stop reason: HOSPADM

## 2019-09-11 RX ORDER — MAGNESIUM SULFATE HEPTAHYDRATE 40 MG/ML
2 INJECTION, SOLUTION INTRAVENOUS ONCE
Status: DISCONTINUED | OUTPATIENT
Start: 2019-09-11 | End: 2019-09-11 | Stop reason: HOSPADM

## 2019-09-11 RX ORDER — POLYETHYLENE GLYCOL 3350 17 G/17G
1 POWDER, FOR SOLUTION ORAL
Status: DISCONTINUED | OUTPATIENT
Start: 2019-09-11 | End: 2019-09-11 | Stop reason: HOSPADM

## 2019-09-11 RX ORDER — BISACODYL 10 MG
10 SUPPOSITORY, RECTAL RECTAL
Status: DISCONTINUED | OUTPATIENT
Start: 2019-09-11 | End: 2019-09-11 | Stop reason: HOSPADM

## 2019-09-11 RX ORDER — ALBUTEROL SULFATE 90 UG/1
2 AEROSOL, METERED RESPIRATORY (INHALATION) EVERY 6 HOURS PRN
Status: DISCONTINUED | OUTPATIENT
Start: 2019-09-11 | End: 2019-09-11 | Stop reason: HOSPADM

## 2019-09-11 RX ORDER — NITROGLYCERIN 0.4 MG/1
0.4 TABLET SUBLINGUAL PRN
Status: DISCONTINUED | OUTPATIENT
Start: 2019-09-11 | End: 2019-09-11 | Stop reason: HOSPADM

## 2019-09-11 RX ORDER — AMOXICILLIN 250 MG
2 CAPSULE ORAL 2 TIMES DAILY
Status: DISCONTINUED | OUTPATIENT
Start: 2019-09-11 | End: 2019-09-11 | Stop reason: HOSPADM

## 2019-09-11 RX ADMIN — CLOPIDOGREL BISULFATE 75 MG: 75 TABLET ORAL at 06:29

## 2019-09-11 RX ADMIN — BUDESONIDE AND FORMOTEROL FUMARATE DIHYDRATE 2 PUFF: 160; 4.5 AEROSOL RESPIRATORY (INHALATION) at 08:13

## 2019-09-11 RX ADMIN — LISINOPRIL 5 MG: 10 TABLET ORAL at 06:29

## 2019-09-11 RX ADMIN — ATORVASTATIN CALCIUM 40 MG: 20 TABLET, FILM COATED ORAL at 06:28

## 2019-09-11 RX ADMIN — METOPROLOL SUCCINATE 50 MG: 50 TABLET, EXTENDED RELEASE ORAL at 08:36

## 2019-09-11 RX ADMIN — ASPIRIN 81 MG: 81 TABLET, COATED ORAL at 06:29

## 2019-09-11 ASSESSMENT — PATIENT HEALTH QUESTIONNAIRE - PHQ9
SUM OF ALL RESPONSES TO PHQ9 QUESTIONS 1 AND 2: 0
2. FEELING DOWN, DEPRESSED, IRRITABLE, OR HOPELESS: NOT AT ALL
1. LITTLE INTEREST OR PLEASURE IN DOING THINGS: NOT AT ALL

## 2019-09-11 ASSESSMENT — ENCOUNTER SYMPTOMS
FEVER: 0
TINGLING: 0
PHOTOPHOBIA: 0
MYALGIAS: 0
BLURRED VISION: 0
VOMITING: 0
CHILLS: 0
DOUBLE VISION: 0
ABDOMINAL PAIN: 0
NECK PAIN: 0
NAUSEA: 0
PALPITATIONS: 0
ORTHOPNEA: 0
DIZZINESS: 0
BRUISES/BLEEDS EASILY: 0
WEIGHT LOSS: 0
CLAUDICATION: 0
BACK PAIN: 0
HEADACHES: 0
SHORTNESS OF BREATH: 1
HEARTBURN: 0
COUGH: 1
DEPRESSION: 0

## 2019-09-11 ASSESSMENT — COPD QUESTIONNAIRES
DO YOU EVER COUGH UP ANY MUCUS OR PHLEGM?: YES, EVERY DAY
HAVE YOU SMOKED AT LEAST 100 CIGARETTES IN YOUR ENTIRE LIFE: YES
DURING THE PAST 4 WEEKS HOW MUCH DID YOU FEEL SHORT OF BREATH: SOME OF THE TIME
COPD SCREENING SCORE: 8

## 2019-09-11 ASSESSMENT — LIFESTYLE VARIABLES
TOTAL SCORE: 0
HAVE PEOPLE ANNOYED YOU BY CRITICIZING YOUR DRINKING: NO
EVER HAD A DRINK FIRST THING IN THE MORNING TO STEADY YOUR NERVES TO GET RID OF A HANGOVER: NO
EVER FELT BAD OR GUILTY ABOUT YOUR DRINKING: NO
CONSUMPTION TOTAL: NEGATIVE
HAVE YOU EVER FELT YOU SHOULD CUT DOWN ON YOUR DRINKING: NO
AVERAGE NUMBER OF DAYS PER WEEK YOU HAVE A DRINK CONTAINING ALCOHOL: 0
HOW MANY TIMES IN THE PAST YEAR HAVE YOU HAD 5 OR MORE DRINKS IN A DAY: 0
DOES PATIENT WANT TO STOP DRINKING: NO
EVER_SMOKED: YES
TOTAL SCORE: 0
TOTAL SCORE: 0
ALCOHOL_USE: NO
EVER_SMOKED: YES
ON A TYPICAL DAY WHEN YOU DRINK ALCOHOL HOW MANY DRINKS DO YOU HAVE: 0

## 2019-09-11 NOTE — H&P
"      Internal Medicine Admitting History and Physical    Note Author: Gayla Rachel M.D.       Name Fredy Gonsalez     1945   Age/Sex 73 y.o. male   MRN [id number]   Code Status DNR/DNI     After 5PM or if no immediate response to page, please call for cross-coverage  Attending/Team: /cornelio See Patient List for primary contact information  Call [phone] to page    1st Call - Day Intern (R1):    2nd Call - Day Sr. Resident (R2/R3):          Chief Complaint:   Chest pain    HPI:   is a 73 yr old gentleman with past medical H/O of COPD on 2 litres of home oxygen, CAD s/p CABG 2 yrs ago in 2017, Hyperlipidemia comes to [facility] with complaints of chest pain that started at 10\"0 clock in the night while he was watching television and drinking coffee. He says his pain is stabbing in nature, continuous and grades as 8/10 and radiating to back and left shoulder. He also had profuse sweating with the pain. No aggravating and relieving factors, he took nitroglycerin but it didn't help the pain. No associated nausea and vomiting. He also has some shortness of breath associated with dry cough but he says its because of his copd and not any different.   He also says his left leg hurts and is little numb. He says after his CABG, he never had any chest pain until today  No complaints of abdominal pain, heartburn, headaches, blurring of vision.  No urinary complaints.  He smokes a pack of cigarettes for the past 50 yrs, quit alcohol 30 yrs ago an denies any drug abuse    ED course: In the ER, pt feels better regarding the pain, he grades it as 5/10 while evaluating. He is a good historian and communicating well. His vitals were stable, troponin was 14 and wbc count was 11.5.  Other labs are reassuring. EKG didn't show any acute ST changes. He will be admitted to medicine telemetry for observation       Review of Systems   Constitutional: Negative for chills, fever " and weight loss.   HENT: Negative for ear pain, hearing loss and tinnitus.    Eyes: Negative for blurred vision, double vision and photophobia.   Respiratory: Positive for cough and shortness of breath.    Cardiovascular: Positive for chest pain. Negative for palpitations, orthopnea, claudication and leg swelling.   Gastrointestinal: Negative for abdominal pain, heartburn, nausea and vomiting.   Genitourinary: Negative for dysuria and urgency.   Musculoskeletal: Negative for myalgias and neck pain.   Skin: Negative for itching and rash.   Neurological: Negative for dizziness, tingling and headaches.   Psychiatric/Behavioral: Negative for depression and suicidal ideas.             Past Medical History (Chronic medical problem, known complications and current treatment)    -COPD on home oxygen of 2 litres  -CAD s/p CABG 2 yrs ago  -Hyperlipidemia  -No DM type II, HTN, asthma, seizures    Past Surgical History:  Past Surgical History:   Procedure Laterality Date   • STENT PLACEMENT  12/02/2017    left main to proximal LAD, mid LAD, ostial left main   • OPEN REDUCTION      left arm. 32 yrs ago       Current Outpatient Medications:  Home Medications    **Home medications have not yet been reviewed for this encounter**         Medication Allergy/Sensitivities:  Allergies   Allergen Reactions   • Chantix [Varenicline]      Made patient feel sick.   • Fish Vomiting         Family History (mandatory)   Family History   Problem Relation Age of Onset   • Arthritis Mother    • Lung Disease Mother    • Osteoporosis Mother    • Alzheimer's Disease Mother    • Other Mother         parkinsons   • Kidney Disease Father    • Cancer Neg Hx    • Diabetes Neg Hx    • Heart Disease Neg Hx    • Stroke Neg Hx        Social History (mandatory)   Social History     Socioeconomic History   • Marital status: Single     Spouse name: Not on file   • Number of children: Not on file   • Years of education: Not on file   • Highest education level:  Not on file   Occupational History   • Not on file   Social Needs   • Financial resource strain: Not on file   • Food insecurity:     Worry: Not on file     Inability: Not on file   • Transportation needs:     Medical: Not on file     Non-medical: Not on file   Tobacco Use   • Smoking status: Current Every Day Smoker     Packs/day: 1.00     Years: 60.00     Pack years: 60.00     Types: Cigarettes     Start date: 6/15/1957   • Smokeless tobacco: Former User     Types: Chew     Quit date: 1957   Substance and Sexual Activity   • Alcohol use: No     Alcohol/week: 0.0 oz     Comment: quit 28yrs ago   • Drug use: No     Types: Marijuana     Comment: quit in 1969, THC Hash    • Sexual activity: Never     Partners: Female     Birth control/protection: Condom   Lifestyle   • Physical activity:     Days per week: Not on file     Minutes per session: Not on file   • Stress: Not on file   Relationships   • Social connections:     Talks on phone: Not on file     Gets together: Not on file     Attends Worship service: Not on file     Active member of club or organization: Not on file     Attends meetings of clubs or organizations: Not on file     Relationship status: Not on file   • Intimate partner violence:     Fear of current or ex partner: Not on file     Emotionally abused: Not on file     Physically abused: Not on file     Forced sexual activity: Not on file   Other Topics Concern   • Not on file   Social History Narrative   • Not on file     Living situation:   PCP : Robert Lindo M.D.    Physical Exam     Vitals:    09/10/19 2310 09/10/19 2339 09/11/19 0110 09/11/19 0200   BP:  (!) 95/54 (!) 98/66 109/61   Pulse:   76 75   Resp:       Temp:       TempSrc:       SpO2:   98% 97%   Weight: 73.7 kg (162 lb 7.7 oz)      Height:         Body mass index is 25.45 kg/m².  O2 therapy: Pulse Oximetry: 97 %    Physical Exam   Constitutional: He is oriented to person, place, and time and well-developed, well-nourished, and in  no distress.   HENT:   Head: Normocephalic and atraumatic.   Eyes: Pupils are equal, round, and reactive to light. Conjunctivae and EOM are normal.   Neck: Normal range of motion. Neck supple.   Cardiovascular: Normal rate, regular rhythm and normal heart sounds.   Pulmonary/Chest: Effort normal. He has wheezes.   Abdominal: Soft. Bowel sounds are normal.   Musculoskeletal: Normal range of motion. He exhibits no edema or deformity.   Neurological: He is alert and oriented to person, place, and time.   Skin: Skin is warm and dry.   Psychiatric: Affect and judgment normal.         Data Review       Old Records Request:   Completed  Current Records review/summary: Completed    Lab Data Review:  Recent Results (from the past 24 hour(s))   EKG (NOW)    Collection Time: 09/10/19 11:13 PM   Result Value Ref Range    Report       [facility] Emergency Dept[facility]    Test Date:  2019-09-10  Pt Name:    TOSHIA VERGARA             Department: ER  MRN:        [id number]                      Room:  Gender:     Male                         Technician: [id number]  :        1945                   Requested By:ER TRIAGE PROTOCOL  Order #:    [id number]                    Reading MD: ANTHONY SMITH MD    Measurements  Intervals                                Axis  Rate:       88                           P:          66  AL:         140                          QRS:        18  QRSD:       102                          T:          4  QT:         352  QTc:        426    Interpretive Statements  SINUS RHYTHM  PROBABLE LEFT ATRIAL ABNORMALITY  CONSIDER RVH OR POSTERIOR INFARCT  INFERIOR INFARCT, AGE INDETERMINATE  Compared to ECG 2019 01:02:32  Atrial premature complex(es) no longer present  Incomplete right bundle-branch block no longer present  Myocardial infarct finding still present    Electronical ly Signed On 2019 0:24:53 PDT by ANTHONY SMITH MD     Complete Metabolic Panel (CMP)    Collection Time:  09/10/19 11:30 PM   Result Value Ref Range    Sodium 140 135 - 145 mmol/L    Potassium 4.1 3.6 - 5.5 mmol/L    Chloride 107 96 - 112 mmol/L    Co2 22 20 - 33 mmol/L    Anion Gap 11.0 0.0 - 11.9    Glucose 102 (H) 65 - 99 mg/dL    Bun 19 8 - 22 mg/dL    Creatinine 1.23 0.50 - 1.40 mg/dL    Calcium 9.1 8.5 - 10.5 mg/dL    AST(SGOT) 13 12 - 45 U/L    ALT(SGPT) 11 2 - 50 U/L    Alkaline Phosphatase 59 30 - 99 U/L    Total Bilirubin 0.5 0.1 - 1.5 mg/dL    Albumin 4.1 3.2 - 4.9 g/dL    Total Protein 7.2 6.0 - 8.2 g/dL    Globulin 3.1 1.9 - 3.5 g/dL    A-G Ratio 1.3 g/dL   Troponin    Collection Time: 09/10/19 11:30 PM   Result Value Ref Range    Troponin T 14 6 - 19 ng/L   ESTIMATED GFR    Collection Time: 09/10/19 11:30 PM   Result Value Ref Range    GFR If African American >60 >60 mL/min/1.73 m 2    GFR If Non African American 58 (A) >60 mL/min/1.73 m 2   EKG (NOW)    Collection Time: 09/10/19 11:40 PM   Result Value Ref Range    Report       [facility] Emergency Dept.    Test Date:  2019-09-10  Pt Name:    TOSHIA VERGARA             Department: ER  MRN:        [id number]                      Room:       [facility]  Gender:     Male                         Technician: [id number]  :        1945                   Requested By:ER TRIAGE PROTOCOL  Order #:    [id number]                    Reading MD: ANTHONY SMITH MD    Measurements  Intervals                                Axis  Rate:       82                           P:          62  IA:         132                          QRS:        8  QRSD:       116                          T:          34  QT:         369  QTc:        431    Interpretive Statements  Sinus rhythm  Atrial premature complex  Incomplete right bundle branch block  Consider inferior infarct  Compared to ECG 09/10/2019 23:13:21  Atrial premature complex(es) now present  Incomplete right bundle-branch block now present  Myocardial infarct finding still present    Electronically Signed  On 9-  0:25:06 PDT by ANTHONY SMITH MD     CBC WITH DIFFERENTIAL    Collection Time: 09/10/19 11:55 PM   Result Value Ref Range    WBC 11.5 (H) 4.8 - 10.8 K/uL    RBC 4.02 (L) 4.70 - 6.10 M/uL    Hemoglobin 13.8 (L) 14.0 - 18.0 g/dL    Hematocrit 41.0 (L) 42.0 - 52.0 %    .0 (H) 81.4 - 97.8 fL    MCH 34.3 (H) 27.0 - 33.0 pg    MCHC 33.7 33.7 - 35.3 g/dL    RDW 48.0 35.9 - 50.0 fL    Platelet Count 198 164 - 446 K/uL    MPV 9.5 9.0 - 12.9 fL    Neutrophils-Polys 66.10 44.00 - 72.00 %    Lymphocytes 15.20 (L) 22.00 - 41.00 %    Monocytes 7.10 0.00 - 13.40 %    Eosinophils 10.80 (H) 0.00 - 6.90 %    Basophils 0.50 0.00 - 1.80 %    Immature Granulocytes 0.30 0.00 - 0.90 %    Nucleated RBC 0.00 /100 WBC    Neutrophils (Absolute) 7.59 (H) 1.82 - 7.42 K/uL    Lymphs (Absolute) 1.74 1.00 - 4.80 K/uL    Monos (Absolute) 0.82 0.00 - 0.85 K/uL    Eos (Absolute) 1.24 (H) 0.00 - 0.51 K/uL    Baso (Absolute) 0.06 0.00 - 0.12 K/uL    Immature Granulocytes (abs) 0.03 0.00 - 0.11 K/uL    NRBC (Absolute) 0.00 K/uL       Imaging/Procedures Review:    Independant Imaging Review: Completed  DX-CHEST-PORTABLE (1 VIEW)   Final Result      Stable chest without acute/new abnormality.               EKG:   EKG Independent Review: Completed  QTc:, HR: , Normal Sinus Rhythm, no ST/T changes     Records reviewed and summarized in current documentation :  Yes  [employer] teaching service handout given to patient:  No         Assessment/Plan     S/P CABG (coronary artery bypass graft)  Assessment & Plan  -Pt had CAD s/p CABG in December 2017  -No episodes of chest pain after the surgery until today's episode  -continue clopidogrel,lisinopril and metoprolol  -Echo from 04/2019 showed EF of 55%  -      Atypical chest pain  Assessment & Plan  -Pt says he has chest pain while watching television and not relieved with nitroglycerin  -EKG didn't show any acute ST segment changes  -troponin was 14  -admitted to telemetry  -Pt feels better  "regarding the pain  -will trend troponin, next troponin at 4\"0 clock in the morning  -NPO in case the pt needs stress test tomorrow  -monitor      COPD (chronic obstructive pulmonary disease) ([facility])  Assessment & Plan  -currently stable  -Pt is on home oxygen of 2 litres  -continue albuterol inhalers as needed  -Pt is maintaining saturations  -respiratory protocol placed  -continue to monitor    Hyperlipidemia  Assessment & Plan  -continue atorvastatin  -monitor      Tobacco abuse  Assessment & Plan  -Pt smokes pack of cigarettes a day since 50 yrs  -nicotine replacement while in the hospital      Anticipated Hospital stay: Observation admit        Quality Measures  Quality-Core Measures   Reviewed items::  EKG reviewed, Labs reviewed and Medications reviewed  DVT prophylaxis pharmacological::  Enoxaparin (Lovenox)  Ulcer Prophylaxis::  No    PCP: Robert Lindo M.D.  "

## 2019-09-11 NOTE — ASSESSMENT & PLAN NOTE
-currently stable  -Pt is on home oxygen of 2 litres  -continue albuterol inhalers as needed  -Pt is maintaining saturations  -respiratory protocol placed  -continue to monitor

## 2019-09-11 NOTE — PROGRESS NOTES
"Assumed care of patient at 0700. Initial assessment completed. Patient is A&Ox4 and able to make needs known. Patient denies CP, SOB. Pt updated on POC and NPO status, pt stated \"I'm taking this stuff off and leaving if I don't eat soon.\" Mild WOB at rest on 2L NC; scattered inspiratory and expiratory wheezes; see flowsheet. SR 60 on monitor; O2 sat 96% on 2L.  POC discussed with pt: troponin, labs. Blood collected, labeled and sent to lab. No further questions at this time. Fall precautions in place. Bed locked and in the lowest position, call light instructions provided, call light within reach. Needs met.  "

## 2019-09-11 NOTE — PROGRESS NOTES
Pt into unit from ED via gurney transported by this RN on tele monitor, HR:80s, no ectopy, Pt AOx4, reports mild left sided chest discomfort radiating to back and shoulder, mild work of breathing, non productive cough, ambulatory w/ steady gait. Patient oriented to unit, room and BR location. Weight and VS taken. Attached to cardiac monitoring. Admit profile and initial assessment done. Plan of care reviewed w/ patient, verbalizes understanding. Safety and comfort measures provided. Fall precautions in place. Patient questions answered. Encouraged to verbalize feelings and needs. Call light within reach. Will continue to assess and monitor.

## 2019-09-11 NOTE — ASSESSMENT & PLAN NOTE
-Pt had CAD s/p CABG in December 2017  -No episodes of chest pain after the surgery until today's episode  -continue clopidogrel,lisinopril and metoprolol  -Echo from 04/2019 showed EF of 55%  -

## 2019-09-11 NOTE — ASSESSMENT & PLAN NOTE
"-Pt says he has chest pain while watching television and not relieved with nitroglycerin  -EKG didn't show any acute ST segment changes  -troponin was 14  -admitted to telemetry  -Pt feels better regarding the pain  -will trend troponin, next troponin at 4\"0 clock in the morning  -NPO in case the pt needs stress test tomorrow  -monitor    "

## 2019-09-11 NOTE — CARE PLAN
Problem: Unstable Angina/Chest Pain/Non Stemi  Goal: Optimal Care of the Angina/Chest Pain/Non Stemi Patient  Outcome: PROGRESSING AS EXPECTED  Interventions:   >Vital Signs and Cardiac Monitoring   >Serial Troponins, CBC, Chem Panel, Chest X-Ray per orders,  >EKG on admission and every 6 hours: obtain with all episodes of chest pain  >Oxygen, Nitrates and Aspirin as ordered  >Anticoagulatiom/VTE Prophylaxis: Enoxaparin  >Monitor for signs and symptoms of cardiogenic shock including pallor, diaphoresis, decreased pulse, new murmur and hypotension

## 2019-09-11 NOTE — CONSULTS
Reason for Consult:  Asked by Dr Connor found to see this patient with chst pain CAD  Patient's PCP: Robert Lindo M.D.    CC:   Chief Complaint   Patient presents with   • Chest Pain     L sided radiating to back. laying down makes it worse. hx of cabgx3 2 years ago.    • Dizziness     and diaphoretic        HPI: This is a 73-year-old gentleman with history of CABG and prior stenting in 2017 full details unknown but he had a stress test which showed a mild area of ischemia inferiorly he presents with left-sided chest pain that had radiated with some diuresis according to the records he was adamant about leaving the hospital immediately as he was frustrated by being n.p.o. this morning until the decision to be made about further appropriate work-up  Medications / Drug list prior to admission:  No current facility-administered medications on file prior to encounter.      Current Outpatient Medications on File Prior to Encounter   Medication Sig Dispense Refill   • [START ON 10/26/2019] HYDROcodone-acetaminophen (NORCO) 5-325 MG Tab per tablet Take 1 Tab by mouth every 8 hours as needed for up to 30 days. Prescription must last 30 days. 90 Tab 0   • MethylPREDNISolone (MEDROL DOSEPAK) 4 MG Tablet Therapy Pack Use as directed 1 Kit 0   • atorvastatin (LIPITOR) 40 MG Tab Take 1 Tab by mouth every day. 30 Tab 11   • metoprolol SR (TOPROL XL) 50 MG TABLET SR 24 HR Take 1 Tab by mouth every day. 30 Tab 11   • clopidogrel (PLAVIX) 75 MG Tab Take 1 Tab by mouth every day. 30 Tab 11   • lisinopril (PRINIVIL) 5 MG Tab Take 1 Tab by mouth every day. 30 Tab 11   • budesonide-formoterol (SYMBICORT) 160-4.5 MCG/ACT Aerosol Inhale 2 Puffs by mouth 2 Times a Day. 2 Inhaler 0   • Tiotropium Bromide Monohydrate (SPIRIVA RESPIMAT) 2.5 MCG/ACT Aero Soln Inhale 2 Inhalation by mouth every day. Assemble and prime. 2 Inhaler 0   • nitroglycerin (NITROSTAT) 0.4 MG SL Tab ONE TABLET UNDER TONGUE AS NEEDED FOR CHEST PAIN 25 Tab 0   •  aspirin EC (ECOTRIN) 81 MG Tablet Delayed Response Take 81 mg by mouth every day.     • albuterol 108 (90 BASE) MCG/ACT Aero Soln inhalation aerosol Inhale 2 Puffs by mouth every 6 hours as needed for Shortness of Breath. 8.5 g 11   • albuterol (PROAIR HFA) 108 (90 BASE) MCG/ACT Aero Soln inhalation aerosol Inhale 2 Puffs by mouth every 6 hours as needed. 8.5 g 3       Current list of administered Medications:  No current facility-administered medications for this encounter.     Current Outpatient Medications:   •  [START ON 10/26/2019] HYDROcodone-acetaminophen (NORCO) 5-325 MG Tab per tablet, Take 1 Tab by mouth every 8 hours as needed for up to 30 days. Prescription must last 30 days., Disp: 90 Tab, Rfl: 0  •  MethylPREDNISolone (MEDROL DOSEPAK) 4 MG Tablet Therapy Pack, Use as directed, Disp: 1 Kit, Rfl: 0  •  atorvastatin (LIPITOR) 40 MG Tab, Take 1 Tab by mouth every day., Disp: 30 Tab, Rfl: 11  •  metoprolol SR (TOPROL XL) 50 MG TABLET SR 24 HR, Take 1 Tab by mouth every day., Disp: 30 Tab, Rfl: 11  •  clopidogrel (PLAVIX) 75 MG Tab, Take 1 Tab by mouth every day., Disp: 30 Tab, Rfl: 11  •  lisinopril (PRINIVIL) 5 MG Tab, Take 1 Tab by mouth every day., Disp: 30 Tab, Rfl: 11  •  budesonide-formoterol (SYMBICORT) 160-4.5 MCG/ACT Aerosol, Inhale 2 Puffs by mouth 2 Times a Day., Disp: 2 Inhaler, Rfl: 0  •  Tiotropium Bromide Monohydrate (SPIRIVA RESPIMAT) 2.5 MCG/ACT Aero Soln, Inhale 2 Inhalation by mouth every day. Assemble and prime., Disp: 2 Inhaler, Rfl: 0  •  nitroglycerin (NITROSTAT) 0.4 MG SL Tab, ONE TABLET UNDER TONGUE AS NEEDED FOR CHEST PAIN, Disp: 25 Tab, Rfl: 0  •  aspirin EC (ECOTRIN) 81 MG Tablet Delayed Response, Take 81 mg by mouth every day., Disp: , Rfl:   •  albuterol 108 (90 BASE) MCG/ACT Aero Soln inhalation aerosol, Inhale 2 Puffs by mouth every 6 hours as needed for Shortness of Breath., Disp: 8.5 g, Rfl: 11  •  albuterol (PROAIR HFA) 108 (90 BASE) MCG/ACT Aero Soln inhalation aerosol,  Inhale 2 Puffs by mouth every 6 hours as needed., Disp: 8.5 g, Rfl: 3    Past Medical History:   Diagnosis Date   • Arthritis    • COPD    • EMPHYSEMA    • Heart attack (HCC) 12/02/2017    received 3 stents, [facility]   • Hyperlipidemia 4/17/2012       Past Surgical History:   Procedure Laterality Date   • STENT PLACEMENT  12/02/2017    left main to proximal LAD, mid LAD, ostial left main   • OPEN REDUCTION      left arm. 32 yrs ago       Family History   Problem Relation Age of Onset   • Arthritis Mother    • Lung Disease Mother    • Osteoporosis Mother    • Alzheimer's Disease Mother    • Other Mother         parkinsons   • Kidney Disease Father    • Cancer Neg Hx    • Diabetes Neg Hx    • Heart Disease Neg Hx    • Stroke Neg Hx      Patient family history was personally reviewed, no pertinent family history to current presentation    Social History     Socioeconomic History   • Marital status: Single     Spouse name: Not on file   • Number of children: Not on file   • Years of education: Not on file   • Highest education level: Not on file   Occupational History   • Not on file   Social Needs   • Financial resource strain: Not on file   • Food insecurity:     Worry: Not on file     Inability: Not on file   • Transportation needs:     Medical: Not on file     Non-medical: Not on file   Tobacco Use   • Smoking status: Current Every Day Smoker     Packs/day: 1.00     Years: 60.00     Pack years: 60.00     Types: Cigarettes     Start date: 6/15/1957   • Smokeless tobacco: Former User     Types: Chew     Quit date: 1957   Substance and Sexual Activity   • Alcohol use: No     Alcohol/week: 0.0 oz     Comment: quit 28yrs ago   • Drug use: No     Types: Marijuana     Comment: quit in 1969, THC Hash    • Sexual activity: Never     Partners: Female     Birth control/protection: Condom   Lifestyle   • Physical activity:     Days per week: Not on file     Minutes per session: Not on file   • Stress: Not on file  "  Relationships   • Social connections:     Talks on phone: Not on file     Gets together: Not on file     Attends Alevism service: Not on file     Active member of club or organization: Not on file     Attends meetings of clubs or organizations: Not on file     Relationship status: Not on file   • Intimate partner violence:     Fear of current or ex partner: Not on file     Emotionally abused: Not on file     Physically abused: Not on file     Forced sexual activity: Not on file   Other Topics Concern   • Not on file   Social History Narrative   • Not on file       ALLERGIES:  Allergies   Allergen Reactions   • Chantix [Varenicline]      Made patient feel sick.   • Fish Vomiting       Review of systems:  A complete review of symptoms was not obtained pt declined    Physical exam:  Patient Vitals for the past 24 hrs:   BP Temp Temp src Pulse Resp SpO2 Height Weight   19 0814 -- -- -- 83 16 96 % -- --   19 0811 107/57 36.7 °C (98.1 °F) Temporal 88 16 97 % -- --   19 0250 144/70 37.1 °C (98.7 °F) Temporal 82 20 95 % 1.702 m (5' 7\") 73 kg (160 lb 15 oz)   19 0200 109/61 -- -- 75 -- 97 % -- --   19 0110 (!) 98/66 -- -- 76 -- 98 % -- --   09/10/19 2339 (!) 95/54 -- -- -- -- -- -- --   09/10/19 2310 -- -- -- -- -- -- -- 73.7 kg (162 lb 7.7 oz)   09/10/19 2306 119/67 36.2 °C (97.2 °F) Temporal 94 18 97 % 1.702 m (5' 7\") --     Pt declined exam      Data:  Laboratory studies personally reviewed by me:  Recent Results (from the past 24 hour(s))   EKG (NOW)    Collection Time: 09/10/19 11:13 PM   Result Value Ref Range    Report       [facility] Emergency Dept[facility]    Test Date:  2019-09-10  Pt Name:    TOSHIA VERGARA             Department: ER  MRN:        [id number]                      Room:  Gender:     Male                         Technician: [id number]  :        1945                   Requested By:ER TRIAGE PROTOCOL  Order #:    [id number]                    Jeffrey DEVI: " ANTHONY SMITH MD    Measurements  Intervals                                Axis  Rate:       88                           P:          66  WV:         140                          QRS:        18  QRSD:       102                          T:          4  QT:         352  QTc:        426    Interpretive Statements  SINUS RHYTHM  PROBABLE LEFT ATRIAL ABNORMALITY  CONSIDER RVH OR POSTERIOR INFARCT  INFERIOR INFARCT, AGE INDETERMINATE  Compared to ECG 06/09/2019 01:02:32  Atrial premature complex(es) no longer present  Incomplete right bundle-branch block no longer present  Myocardial infarct finding still present    Electronical ly Signed On 9- 0:24:53 PDT by ANTHONY SMITH MD     Complete Metabolic Panel (CMP)    Collection Time: 09/10/19 11:30 PM   Result Value Ref Range    Sodium 140 135 - 145 mmol/L    Potassium 4.1 3.6 - 5.5 mmol/L    Chloride 107 96 - 112 mmol/L    Co2 22 20 - 33 mmol/L    Anion Gap 11.0 0.0 - 11.9    Glucose 102 (H) 65 - 99 mg/dL    Bun 19 8 - 22 mg/dL    Creatinine 1.23 0.50 - 1.40 mg/dL    Calcium 9.1 8.5 - 10.5 mg/dL    AST(SGOT) 13 12 - 45 U/L    ALT(SGPT) 11 2 - 50 U/L    Alkaline Phosphatase 59 30 - 99 U/L    Total Bilirubin 0.5 0.1 - 1.5 mg/dL    Albumin 4.1 3.2 - 4.9 g/dL    Total Protein 7.2 6.0 - 8.2 g/dL    Globulin 3.1 1.9 - 3.5 g/dL    A-G Ratio 1.3 g/dL   Troponin    Collection Time: 09/10/19 11:30 PM   Result Value Ref Range    Troponin T 14 6 - 19 ng/L   ESTIMATED GFR    Collection Time: 09/10/19 11:30 PM   Result Value Ref Range    GFR If African American >60 >60 mL/min/1.73 m 2    GFR If Non African American 58 (A) >60 mL/min/1.73 m 2   EKG (NOW)    Collection Time: 09/10/19 11:40 PM   Result Value Ref Range    Report       [facility] Emergency Dept.    Test Date:  2019-09-10  Pt Name:    TOSHIA VERGARA             Department: ER  MRN:        [id number]                      Room:       [facility]  Gender:     Male                         Technician:  [id number]  :        1945                   Requested By:ER TRIAGE PROTOCOL  Order #:    [id number]                    Reading MD: ANTHONY SMITH MD    Measurements  Intervals                                Axis  Rate:       82                           P:          62  LA:         132                          QRS:        8  QRSD:       116                          T:          34  QT:         369  QTc:        431    Interpretive Statements  Sinus rhythm  Atrial premature complex  Incomplete right bundle branch block  Consider inferior infarct  Compared to ECG 09/10/2019 23:13:21  Atrial premature complex(es) now present  Incomplete right bundle-branch block now present  Myocardial infarct finding still present    Electronically Signed On  0:25:06 PDT by ANTHONY SMITH MD     CBC WITH DIFFERENTIAL    Collection Time: 09/10/19 11:55 PM   Result Value Ref Range    WBC 11.5 (H) 4.8 - 10.8 K/uL    RBC 4.02 (L) 4.70 - 6.10 M/uL    Hemoglobin 13.8 (L) 14.0 - 18.0 g/dL    Hematocrit 41.0 (L) 42.0 - 52.0 %    .0 (H) 81.4 - 97.8 fL    MCH 34.3 (H) 27.0 - 33.0 pg    MCHC 33.7 33.7 - 35.3 g/dL    RDW 48.0 35.9 - 50.0 fL    Platelet Count 198 164 - 446 K/uL    MPV 9.5 9.0 - 12.9 fL    Neutrophils-Polys 66.10 44.00 - 72.00 %    Lymphocytes 15.20 (L) 22.00 - 41.00 %    Monocytes 7.10 0.00 - 13.40 %    Eosinophils 10.80 (H) 0.00 - 6.90 %    Basophils 0.50 0.00 - 1.80 %    Immature Granulocytes 0.30 0.00 - 0.90 %    Nucleated RBC 0.00 /100 WBC    Neutrophils (Absolute) 7.59 (H) 1.82 - 7.42 K/uL    Lymphs (Absolute) 1.74 1.00 - 4.80 K/uL    Monos (Absolute) 0.82 0.00 - 0.85 K/uL    Eos (Absolute) 1.24 (H) 0.00 - 0.51 K/uL    Baso (Absolute) 0.06 0.00 - 0.12 K/uL    Immature Granulocytes (abs) 0.03 0.00 - 0.11 K/uL    NRBC (Absolute) 0.00 K/uL   TROPONIN    Collection Time: 19  4:00 AM   Result Value Ref Range    Troponin T 16 6 - 19 ng/L   CBC with Differential    Collection Time: 19  6:15 AM    Result Value Ref Range    WBC 8.3 4.8 - 10.8 K/uL    RBC 4.05 (L) 4.70 - 6.10 M/uL    Hemoglobin 13.7 (L) 14.0 - 18.0 g/dL    Hematocrit 41.9 (L) 42.0 - 52.0 %    .5 (H) 81.4 - 97.8 fL    MCH 33.8 (H) 27.0 - 33.0 pg    MCHC 32.7 (L) 33.7 - 35.3 g/dL    RDW 49.6 35.9 - 50.0 fL    Platelet Count 211 164 - 446 K/uL    MPV 9.5 9.0 - 12.9 fL    Neutrophils-Polys 47.20 44.00 - 72.00 %    Lymphocytes 29.70 22.00 - 41.00 %    Monocytes 9.80 0.00 - 13.40 %    Eosinophils 12.40 (H) 0.00 - 6.90 %    Basophils 0.70 0.00 - 1.80 %    Immature Granulocytes 0.20 0.00 - 0.90 %    Nucleated RBC 0.00 /100 WBC    Neutrophils (Absolute) 3.89 1.82 - 7.42 K/uL    Lymphs (Absolute) 2.45 1.00 - 4.80 K/uL    Monos (Absolute) 0.81 0.00 - 0.85 K/uL    Eos (Absolute) 1.02 (H) 0.00 - 0.51 K/uL    Baso (Absolute) 0.06 0.00 - 0.12 K/uL    Immature Granulocytes (abs) 0.02 0.00 - 0.11 K/uL    NRBC (Absolute) 0.00 K/uL   Comp Metabolic Panel (CMP)    Collection Time: 09/11/19  6:15 AM   Result Value Ref Range    Sodium 139 135 - 145 mmol/L    Potassium 4.3 3.6 - 5.5 mmol/L    Chloride 112 96 - 112 mmol/L    Co2 19 (L) 20 - 33 mmol/L    Anion Gap 8.0 0.0 - 11.9    Glucose 104 (H) 65 - 99 mg/dL    Bun 18 8 - 22 mg/dL    Creatinine 1.12 0.50 - 1.40 mg/dL    Calcium 8.5 8.5 - 10.5 mg/dL    AST(SGOT) 10 (L) 12 - 45 U/L    ALT(SGPT) 7 2 - 50 U/L    Alkaline Phosphatase 52 30 - 99 U/L    Total Bilirubin 0.6 0.1 - 1.5 mg/dL    Albumin 3.6 3.2 - 4.9 g/dL    Total Protein 6.1 6.0 - 8.2 g/dL    Globulin 2.5 1.9 - 3.5 g/dL    A-G Ratio 1.4 g/dL   Magnesium    Collection Time: 09/11/19  6:15 AM   Result Value Ref Range    Magnesium 1.8 1.5 - 2.5 mg/dL   ESTIMATED GFR    Collection Time: 09/11/19  6:15 AM   Result Value Ref Range    GFR If African American >60 >60 mL/min/1.73 m 2    GFR If Non African American >60 >60 mL/min/1.73 m 2   TROPONIN    Collection Time: 09/11/19  8:27 AM   Result Value Ref Range    Troponin T 13 6 - 19 ng/L   RETICULOCYTES  COUNT    Collection Time: 09/11/19  9:06 AM   Result Value Ref Range    Reticulocyte Count 1.3 0.8 - 2.1 %    Retic, Absolute 0.05 0.04 - 0.06 M/uL    Imm. Reticulocyte Fraction 10.5 9.3 - 17.4 %    Retic Hgb Equivalent 36.8 (H) 29.0 - 35.0 pg/cell   IRON/TOTAL IRON BIND    Collection Time: 09/11/19  9:06 AM   Result Value Ref Range    Iron 120 50 - 180 ug/dL    Total Iron Binding 223 (L) 250 - 450 ug/dL    % Saturation 54 15 - 55 %   FERRITIN    Collection Time: 09/11/19  9:06 AM   Result Value Ref Range    Ferritin 32.7 22.0 - 322.0 ng/mL   TSH WITH REFLEX TO FT4    Collection Time: 09/11/19  9:06 AM   Result Value Ref Range    TSH 1.370 0.380 - 5.330 uIU/mL   VITAMIN D,25 HYDROXY    Collection Time: 09/11/19  9:06 AM   Result Value Ref Range    25-Hydroxy   Vitamin D 25 27 (L) 30 - 100 ng/mL   Lipid Profile    Collection Time: 09/11/19  9:06 AM   Result Value Ref Range    Cholesterol,Tot 110 100 - 199 mg/dL    Triglycerides 56 0 - 149 mg/dL    HDL 38 (A) >=40 mg/dL    LDL 61 <100 mg/dL       Imaging:  DX-CHEST-PORTABLE (1 VIEW)   Final Result      Stable chest without acute/new abnormality.              EKG : personally reviewed by me  NSR no acute changes    Stress images from April reviewed agree mild area of inferior ischemia    All pertinent features of laboratory and imaging reviewed including primary images where applicable      Active Problems:    Atypical chest pain POA: Unknown  Resolved Problems:    * No resolved hospital problems. *      Assessment / Plan:  CAD/abnormal Stress atypical CP  Could pursue an angiogram for better information but his stress overall is low risk and he is on proper meds, pt is adamant to go home and wouldn't even finish the conversation         I personally discussed his case with  Dr Connor and Dr Bass    Future Appointments   Date Time Provider Department Center   9/18/2019  1:00 PM Kait Bull M.D. [facility]   10/7/2019  8:20 AM Brendon Tong M.D.  [facility] None   10/8/2019  7:45 AM CHIVO Blanchard [facility] None   11/18/2019  8:00 AM Robert Lindo M.D. [facility]       It is my pleasure to participate in the care of Mr. Gonsalez.  Please do not hesitate to contact me with questions or concerns.    Quinton Busby MD PhD [facility]  Cardiologist [facility]    9/11/2019    Please note that this dictation was created using voice recognition software. I have worked with consultants from the vendor as well as technical experts from [facility] to optimize the interface. I have made every reasonable attempt to correct obvious errors, but I expect that there are errors of grammar and possibly content I did not discover before finalizing the note.

## 2019-09-11 NOTE — SENIOR ADMIT NOTE
Senior Admit Note     CC: Chest pain     HPI:  Mr. Gonsalez is a very pleasant 74 yo male with PMHx of CAD s/p 3V CABG (2017), COPD on 2L nocturnal oxygen, HLD and HTN who present with left sided substernal chest pain with radiation to the should and back shoulder blade associated with nausea and diaphoresis. Pain comes and goes at rest and with exertion intermittently, he took nitroglycerin without improvement. He denies shortness of breath associated with the episode but admits to increased dyspnea with exertion over the last several months and is limited to walking about 2 blocks before having to rest.   He had a nuclear stress test done in April 2019 which shoed small moderate severity partially reversible defect in the list to mid inferior wall, he has not followed up with a physician since this testing.     In ED, Temp 97.2, HR 94, RR 18, /67, 97% on 2L NC. Trop 14, WBC 11.5, H/H 13.8/41 with MCV 1.2, electrolytes within normal limits, BUN/Cr 19/1.23. CXR negative for acute process     EKG shows sinus rhythm with incomplete RBBB, no significant ST wave changes, consistent with previous     Physical Exam   Constitutional: He is oriented to person, place, and time. He appears well-developed and well-nourished. No distress.   HENT:   Head: Normocephalic and atraumatic.   Mouth/Throat: Oropharynx is clear and moist.   Poor oral dentition    Eyes: Pupils are equal, round, and reactive to light. Conjunctivae are normal. No scleral icterus.   Neck: Neck supple. No JVD present.   Cardiovascular: Normal rate, regular rhythm and normal heart sounds. Exam reveals no gallop.   No murmur heard.  Well healed sternotomy scar   Pulmonary/Chest: Effort normal. No respiratory distress. He has wheezes.   Diffuse expiratory wheezing    Abdominal: Soft. Bowel sounds are normal. He exhibits no distension. There is no tenderness. There is no rebound.   Musculoskeletal: Normal range of motion. He exhibits no edema.    Neurological: He is alert and oriented to person, place, and time. No cranial nerve deficit.   Skin: Skin is warm and dry. No erythema.   Psychiatric: He has a normal mood and affect. Thought content normal.     Assessment and Plan     74 yo male with CAD s/p CABG presents with chest pain concerning for ACS vs MSK vs possible nerve damage from previous sternotomy. Previous stress test shows reversible ischemia (4/2019) making angina more likely.     - admit to observation with telemetry   - trend trop, repeat EKG if symptoms change   - NPO for possible repeat stress test, consider cardiology consult   - continue home medications including aspirin, plavix   - RT protocol, dunonebs prn   - counseled on smoking cessation     Madelin Sharp MD

## 2019-09-11 NOTE — PROGRESS NOTES
EKGs and POC reviewed with MD. MD unconcerned by pt's EKG, no new orders at this time. Inquired with MD re diet order; pt will not have stress test today but per MD, pt should remain NPO at this time.

## 2019-09-11 NOTE — PROGRESS NOTES
AM labs drawn and sent to lab. Patient resting calmly in bed, STRon cardiac monitor HR:70s-80s. Denies any chest pain/discomfort at this time.

## 2019-09-11 NOTE — DISCHARGE SUMMARY
Internal Medicine Discharge Summary  Note Author: Justyna Bass M.D.       Name Fredy Gonsalez 1945   Age/Sex 73 y.o. male   MRN [id number]         Admit Date:  9/10/2019       Discharge Date:   2019    Service:   [facility] Internal Medicine red team  Attending Physician(s):   Dr. Norris       Senior Resident(s):   Dr. Bass  Og Resident(s):   Dr. Wood  PCP: Robert Lindo M.D.    Primary Diagnosis:   Precordial pain    Secondary Diagnoses:                Principal Problem:    Precordial pain POA: Unknown  Resolved Problems:    * No resolved hospital problems. *      Hospital Summary (Brief Narrative):       Mr. Gonsalez is a pleasant 72 yo male with PMHx of CAD s/p 3V CABG (), COPD on 2L nocturnal oxygen, HLD and HTN who present with left sided substernal chest pain with radiation to the should and back shoulder blade associated with nausea and diaphoresis. Pain comes and goes at rest and with exertion intermittently, he took nitroglycerin without improvement. Nuclear stress test done in 2019 showed small moderate severity partially reversible defect in the list to mid inferior wall, he has not followed up with a physician since this testing.   Initial and repeat EKGs were consistent with previous findings of incomplete right bundle branch block with no significant ST wave changes.  Serial tropes were normal.  Mr. Gonsalez' chest pain resolved overnight.  Cardiology was consulted the following morning who did recommend either stenting or medical management per patient preference however the patient left AMA choosing to follow-up with his cardiologist next month before finishing his discussion with the cardiologist.     Patient /Hospital Summary (Details -- Problem Oriented) :          COPD (chronic obstructive pulmonary disease) (HCC)  Assessment & Plan  -currently stable  -Pt is on home oxygen of 2 litres  -continue albuterol inhalers as needed  -Pt is maintaining  "saturations  -respiratory protocol placed  -continue to monitor    Hyperlipidemia  Assessment & Plan  -continue atorvastatin  -monitor      Tobacco abuse  Assessment & Plan  -Pt smokes pack of cigarettes a day since 50 yrs  -nicotine replacement while in the hospital    * Precordial pain  Assessment & Plan  -Pt says he has chest pain while watching television and not relieved with nitroglycerin  -EKG didn't show any acute ST segment changes  -troponin was 14  -admitted to telemetry  -Pt feels better regarding the pain  -will trend troponin, next troponin at 4\"0 clock in the morning  -NPO in case the pt needs stress test tomorrow  -monitor      S/P CABG (coronary artery bypass graft)  Assessment & Plan  -Pt had CAD s/p CABG in December 2017  -No episodes of chest pain after the surgery until today's episode  -continue clopidogrel,lisinopril and metoprolol  -Echo from 04/2019 showed EF of 55%  -        Consultants:     Radiology: Dr. Busby    Procedures:        None    Imaging/ Testing:      DX-CHEST-PORTABLE (1 VIEW)   Final Result      Stable chest without acute/new abnormality.        Discharge Medications:        Patient may continue previous medications       Medication List      ASK your doctor about these medications      Instructions   * albuterol 108 (90 Base) MCG/ACT Aers inhalation aerosol   Inhale 2 Puffs by mouth every 6 hours as needed.  Dose:  2 Puff     * albuterol 108 (90 Base) MCG/ACT Aers inhalation aerosol   Doctor's comments:  Disp as Ventolin  Inhale 2 Puffs by mouth every 6 hours as needed for Shortness of Breath.  Dose:  2 Puff     aspirin EC 81 MG Tbec  Commonly known as:  ECOTRIN   Take 81 mg by mouth every day.  Dose:  81 mg     atorvastatin 40 MG Tabs  Commonly known as:  LIPITOR   Take 1 Tab by mouth every day.  Dose:  40 mg     budesonide-formoterol 160-4.5 MCG/ACT Aero  Commonly known as:  SYMBICORT   Doctor's comments:  Please provide one month sample  Inhale 2 Puffs by mouth 2 Times a " Day.  Dose:  2 Puff     clopidogrel 75 MG Tabs  Commonly known as:  PLAVIX   Take 1 Tab by mouth every day.  Dose:  75 mg     HYDROcodone-acetaminophen 5-325 MG Tabs per tablet  Start taking on:  10/26/2019  Commonly known as:  NORCO   Take 1 Tab by mouth every 8 hours as needed for up to 30 days. Prescription must last 30 days.  Dose:  1 Tab     lisinopril 5 MG Tabs  Commonly known as:  PRINIVIL   Take 1 Tab by mouth every day.  Dose:  5 mg     methylPREDNISolone 4 MG Tbpk  Commonly known as:  MEDROL DOSEPAK   Use as directed     metoprolol SR 50 MG Tb24  Commonly known as:  TOPROL XL   Take 1 Tab by mouth every day.  Dose:  50 mg     nitroglycerin 0.4 MG Subl  Commonly known as:  NITROSTAT   ONE TABLET UNDER TONGUE AS NEEDED FOR CHEST PAIN     Tiotropium Bromide Monohydrate 2.5 MCG/ACT Aers   Doctor's comments:  Please provide one month sample  Inhale 2 Inhalation by mouth every day. Assemble and prime.  Dose:  2 Inhalation         * This list has 2 medication(s) that are the same as other medications prescribed for you. Read the directions carefully, and ask your doctor or other care provider to review them with you.              Patient left AMA before plan was established    Follow up appointment details :      Future Appointments   Date Time Provider Department Center   9/18/2019  1:00 PM Kait Bull M.D. [facility] C   10/7/2019  8:20 AM Brendon Tong M.D. [facility] None   10/8/2019  7:45 AM CHIVO [facility]anchard PULM None   11/18/2019  8:00 AM Robert Lindo M.D. [facility] C     Pending Studies:        None    Time spent on discharge day patient visit, preparing discharge paperwork and arranging for patient follow up.    Condition on Discharge    ______________________________________________________________________    Interval history/exam for day of discharge:    Patient feeling well this morning, chest pain resolved.  Consulting cardiology as this patient has had a  positive stress test in the past without follow-up and now presenting with chest pain.    Review of Systems   Constitutional: Negative for chills and fever.   HENT: Negative for hearing loss and tinnitus.    Eyes: Negative for blurred vision and double vision.   Respiratory: Positive for cough and shortness of breath.    Cardiovascular: Negative for chest pain and palpitations.   Gastrointestinal: Negative for heartburn and nausea.   Genitourinary: Negative for dysuria and hematuria.   Musculoskeletal: Negative for back pain and myalgias.   Skin: Negative for itching and rash.   Neurological: Negative for dizziness and headaches.   Endo/Heme/Allergies: Negative for environmental allergies. Does not bruise/bleed easily.   Psychiatric/Behavioral: Negative for depression and suicidal ideas.     Physical Exam   Constitutional: He is oriented to person, place, and time and well-developed, well-nourished, and in no distress.   HENT:   Head: Normocephalic and atraumatic.   Eyes: EOM are normal.   Neck: Normal range of motion.   Cardiovascular: Normal rate, regular rhythm, normal heart sounds and intact distal pulses.   Pulmonary/Chest: Effort normal.   Decreased breath sounds, scattered expiratory wheezes in all lung fields   Abdominal: Soft. He exhibits no distension. There is no tenderness.   Musculoskeletal: Normal range of motion. He exhibits no edema.   Neurological: He is alert and oriented to person, place, and time.   Skin: Skin is warm and dry.   Psychiatric: Affect and judgment normal.   Vitals reviewed.    Most Recent Labs:    Lab Results   Component Value Date/Time    WBC 8.3 09/11/2019 06:15 AM    RBC 4.05 (L) 09/11/2019 06:15 AM    HEMOGLOBIN 13.7 (L) 09/11/2019 06:15 AM    HEMATOCRIT 41.9 (L) 09/11/2019 06:15 AM    .5 (H) 09/11/2019 06:15 AM    MCH 33.8 (H) 09/11/2019 06:15 AM    MCHC 32.7 (L) 09/11/2019 06:15 AM    MPV 9.5 09/11/2019 06:15 AM    NEUTSPOLYS 47.20 09/11/2019 06:15 AM    LYMPHOCYTES  29.70 09/11/2019 06:15 AM    MONOCYTES 9.80 09/11/2019 06:15 AM    EOSINOPHILS 12.40 (H) 09/11/2019 06:15 AM    BASOPHILS 0.70 09/11/2019 06:15 AM      Lab Results   Component Value Date/Time    SODIUM 139 09/11/2019 06:15 AM    POTASSIUM 4.3 09/11/2019 06:15 AM    CHLORIDE 112 09/11/2019 06:15 AM    CO2 19 (L) 09/11/2019 06:15 AM    GLUCOSE 104 (H) 09/11/2019 06:15 AM    BUN 18 09/11/2019 06:15 AM    CREATININE 1.12 09/11/2019 06:15 AM      Lab Results   Component Value Date/Time    ALTSGPT 7 09/11/2019 06:15 AM    ASTSGOT 10 (L) 09/11/2019 06:15 AM    ALKPHOSPHAT 52 09/11/2019 06:15 AM    TBILIRUBIN 0.6 09/11/2019 06:15 AM    ALBUMIN 3.6 09/11/2019 06:15 AM    GLOBULIN 2.5 09/11/2019 06:15 AM    MACROCYTOSIS 1+ 02/05/2013 08:06 AM     No results found for: PROTHROMBTM, INR

## 2019-09-11 NOTE — PROGRESS NOTES
Blood drawn and sent to lab. Lab contacted for add-on. MD paged for diet order; MD to consult cardiology and should be able to have answer in 30min. Pt updated and encouraged to call before leaving AMA.

## 2019-09-11 NOTE — ED TRIAGE NOTES
"Chief Complaint   Patient presents with   • Chest Pain     L sided radiating to back. laying down makes it worse. hx of cabgx3 2 years ago.    • Dizziness     and diaphoretic       Pt ambulatory to triage for the above complaints.  Pt reports L sided cp that started yesterday while he was drinking his coffee.  Per pt, sob is his baseline, however he does report dizziness and diaphoresis. He denies n/v.  Pt had cabgx3 2 years ago.       EKG performed prior to triage. Pt roomed to G26 immediately.      /67   Pulse 94   Temp 36.2 °C (97.2 °F) (Temporal)   Resp 18   Ht 1.702 m (5' 7\")   Wt 73.7 kg (162 lb 7.7 oz)   SpO2 97%   BMI 25.45 kg/m²    "

## 2019-09-11 NOTE — PROGRESS NOTES
"Cardiology saw pt. Pt exited room dressed and ready to leave. Pt stated, \"I told you, I was leaving if I didn't get fed.\" Pt stated that he took his IV out and left it on the table. IV on bedside table with tip intact.  Pt signed AMA paperwork and ambulated off unit with steady gait. MD aware. AMA paperwork in chart.  "

## 2019-09-18 ENCOUNTER — TELEPHONE (OUTPATIENT)
Dept: MEDICAL GROUP | Facility: MEDICAL CENTER | Age: 74
End: 2019-09-18

## 2019-09-18 NOTE — TELEPHONE ENCOUNTER
Phone number is disconnected and no email listed in chart.  Will send letter about first no show to appointment today 9/18/19.

## 2019-10-07 ENCOUNTER — OFFICE VISIT (OUTPATIENT)
Dept: CARDIOLOGY | Facility: MEDICAL CENTER | Age: 74
End: 2019-10-07
Payer: MEDICARE

## 2019-10-07 VITALS
WEIGHT: 165 LBS | DIASTOLIC BLOOD PRESSURE: 60 MMHG | SYSTOLIC BLOOD PRESSURE: 118 MMHG | HEART RATE: 72 BPM | BODY MASS INDEX: 25.01 KG/M2 | HEIGHT: 68 IN | OXYGEN SATURATION: 99 %

## 2019-10-07 DIAGNOSIS — E78.5 DYSLIPIDEMIA: ICD-10-CM

## 2019-10-07 DIAGNOSIS — I25.10 CORONARY ARTERY DISEASE, OCCLUSIVE: ICD-10-CM

## 2019-10-07 DIAGNOSIS — Z95.5 S/P CORONARY ARTERY STENT PLACEMENT: ICD-10-CM

## 2019-10-07 DIAGNOSIS — Z95.1 S/P CABG (CORONARY ARTERY BYPASS GRAFT): ICD-10-CM

## 2019-10-07 DIAGNOSIS — Z72.0 TOBACCO ABUSE: ICD-10-CM

## 2019-10-07 DIAGNOSIS — I25.10 CORONARY ARTERY DISEASE INVOLVING NATIVE CORONARY ARTERY OF NATIVE HEART WITHOUT ANGINA PECTORIS: ICD-10-CM

## 2019-10-07 DIAGNOSIS — I25.2 HISTORY OF MYOCARDIAL INFARCTION: ICD-10-CM

## 2019-10-07 PROCEDURE — 99214 OFFICE O/P EST MOD 30 MIN: CPT | Performed by: INTERNAL MEDICINE

## 2019-10-07 ASSESSMENT — ENCOUNTER SYMPTOMS
SHORTNESS OF BREATH: 0
PALPITATIONS: 0
CLAUDICATION: 1
COUGH: 1

## 2019-10-07 NOTE — PROGRESS NOTES
"Chief Complaint   Patient presents with   • Coronary Artery Disease       Subjective:   Fredy Gonsalez Jr. is a 73 y.o. male who presents today for follow-up evaluation of CAD, CABG, coronary intervention, myocardial infarction and hyperlipidemia.    Last seen on 2019.    Since 2019 was recently hospitalized at [facility] which sharp chest pain.  Admitted to observation but got frustrated and left.  Troponin levels were normal.  EKG showed no acute changes.  His symptoms are nonexertional sharp transient.  The patient resides at [facility] located 3 or 4 blocks from the cardiology clinic.  He walks everywhere but is slow due to his claudication.  No angina.    Since 10/9/2018 appointment the patient has some substernal nonradiating chest pain with or without exertion with no other associated symptoms.  Continues to smoke cigarettes.  States that Chantix made him sick and he is tried stop with hypnosis x2.  Has bilateral hip pain with walking relieved with rest.  Chronically short of breath.  Medical records were requested but not available.    Past medical history  According to the patient he was hospitalized at [facility] in [place], [place] where he was hospitalized in  for a \"heart attack\".  The patient had both coronary intervention of the left main ostium, left main, LAD and mid LAD with 3 separate stents and CABG the sequence of which the patient cannot explain and there are no medical records available.    The patient states that he is compliant with his medications.  The patient continues to smoke a pack of cigarettes a day and is done so for \"60 years\".    The patient reports having nonexertional chest pains.  The patient reports severe bilateral hip pain with walking relieved with rest.    Family history  No heart disease.  Father  age 32 of kidney disease.    Past Medical History:   Diagnosis Date   • Arthritis    • COPD    • EMPHYSEMA    • " Heart attack (HCC) 12/02/2017    received 3 stents, [facility]   • Hyperlipidemia 4/17/2012     Past Surgical History:   Procedure Laterality Date   • STENT PLACEMENT  12/02/2017    left main to proximal LAD, mid LAD, ostial left main   • OPEN REDUCTION      left arm. 32 yrs ago     Family History   Problem Relation Age of Onset   • Arthritis Mother    • Lung Disease Mother    • Osteoporosis Mother    • Alzheimer's Disease Mother    • Other Mother         parkinsons   • Kidney Disease Father    • Cancer Neg Hx    • Diabetes Neg Hx    • Heart Disease Neg Hx    • Stroke Neg Hx      Social History     Socioeconomic History   • Marital status: Single     Spouse name: Not on file   • Number of children: Not on file   • Years of education: Not on file   • Highest education level: Not on file   Occupational History   • Not on file   Social Needs   • Financial resource strain: Not on file   • Food insecurity:     Worry: Not on file     Inability: Not on file   • Transportation needs:     Medical: Not on file     Non-medical: Not on file   Tobacco Use   • Smoking status: Current Every Day Smoker     Packs/day: 1.00     Years: 60.00     Pack years: 60.00     Types: Cigarettes     Start date: 6/15/1957   • Smokeless tobacco: Former User     Types: Chew     Quit date: 1957   Substance and Sexual Activity   • Alcohol use: No     Alcohol/week: 0.0 oz     Comment: quit 28yrs ago   • Drug use: No     Types: Marijuana     Comment: quit in 1969, THC Hash    • Sexual activity: Never     Partners: Female     Birth control/protection: Condom   Lifestyle   • Physical activity:     Days per week: Not on file     Minutes per session: Not on file   • Stress: Not on file   Relationships   • Social connections:     Talks on phone: Not on file     Gets together: Not on file     Attends Buddhism service: Not on file     Active member of club or organization: Not on file     Attends meetings of clubs or organizations: Not on file      Relationship status: Not on file   • Intimate partner violence:     Fear of current or ex partner: Not on file     Emotionally abused: Not on file     Physically abused: Not on file     Forced sexual activity: Not on file   Other Topics Concern   • Not on file   Social History Narrative   • Not on file     Allergies   Allergen Reactions   • Chantix [Varenicline]      Made patient feel sick.   • Fish Vomiting     Outpatient Encounter Medications as of 10/7/2019   Medication Sig Dispense Refill   • [START ON 10/26/2019] HYDROcodone-acetaminophen (NORCO) 5-325 MG Tab per tablet Take 1 Tab by mouth every 8 hours as needed for up to 30 days. Prescription must last 30 days. 90 Tab 0   • atorvastatin (LIPITOR) 40 MG Tab Take 1 Tab by mouth every day. 30 Tab 11   • metoprolol SR (TOPROL XL) 50 MG TABLET SR 24 HR Take 1 Tab by mouth every day. 30 Tab 11   • clopidogrel (PLAVIX) 75 MG Tab Take 1 Tab by mouth every day. 30 Tab 11   • lisinopril (PRINIVIL) 5 MG Tab Take 1 Tab by mouth every day. 30 Tab 11   • budesonide-formoterol (SYMBICORT) 160-4.5 MCG/ACT Aerosol Inhale 2 Puffs by mouth 2 Times a Day. 2 Inhaler 0   • Tiotropium Bromide Monohydrate (SPIRIVA RESPIMAT) 2.5 MCG/ACT Aero Soln Inhale 2 Inhalation by mouth every day. Assemble and prime. 2 Inhaler 0   • nitroglycerin (NITROSTAT) 0.4 MG SL Tab ONE TABLET UNDER TONGUE AS NEEDED FOR CHEST PAIN 25 Tab 0   • aspirin EC (ECOTRIN) 81 MG Tablet Delayed Response Take 81 mg by mouth every day.     • albuterol 108 (90 BASE) MCG/ACT Aero Soln inhalation aerosol Inhale 2 Puffs by mouth every 6 hours as needed for Shortness of Breath. 8.5 g 11   • albuterol (PROAIR HFA) 108 (90 BASE) MCG/ACT Aero Soln inhalation aerosol Inhale 2 Puffs by mouth every 6 hours as needed. 8.5 g 3   • MethylPREDNISolone (MEDROL DOSEPAK) 4 MG Tablet Therapy Pack Use as directed (Patient not taking: Reported on 10/7/2019) 1 Kit 0     No facility-administered encounter medications on file as of  "10/7/2019.      Review of Systems   Respiratory: Positive for cough. Negative for shortness of breath.    Cardiovascular: Positive for chest pain and claudication. Negative for palpitations and leg swelling.        Objective:   /60 (BP Location: Left arm, Patient Position: Sitting, BP Cuff Size: Adult)   Pulse 72   Ht 1.727 m (5' 7.99\")   Wt 74.8 kg (165 lb)   SpO2 99%   BMI 25.10 kg/m²     Physical Exam   Constitutional: He is oriented to person, place, and time. He appears well-developed and well-nourished.   HENT:   Head: Normocephalic and atraumatic.   Poor dentition.   Eyes: EOM are normal.   Neck: Neck supple. No JVD present.   Cardiovascular: Normal rate, regular rhythm, normal heart sounds and intact distal pulses. Exam reveals no gallop and no friction rub.   No murmur heard.  Pulses:       Carotid pulses are 0 on the right side, and 1+ on the left side.       Radial pulses are 0 on the right side, and 1+ on the left side.        Femoral pulses are 1+ on the right side with bruit, and 1+ on the left side with bruit.       Posterior tibial pulses are 1+ on the right side, and 1+ on the left side.   Pulmonary/Chest: Effort normal and breath sounds normal. No respiratory distress. He has no wheezes. He has no rales.   Musculoskeletal: Normal range of motion. He exhibits no edema or deformity.   Neurological: He is alert and oriented to person, place, and time. No cranial nerve deficit. He exhibits normal muscle tone.   Skin: Skin is warm and dry.   Psychiatric: He has a normal mood and affect. His behavior is normal.   ECHOCARDIOGRAM 04/30/2019  No prior study is available for comparison.   Normal left ventricular systolic function. Left ventricular ejection   fraction is visually estimated to be 55%.   Indeterminate diastolic function.    MPI 04/30/2019  Normal inferior vena cava size and inspiratory collapse.    * Small sized, moderate severity, partially reversible defect in the distal    to mid " inferior wall. SDS of 2, SSS of 4.    * Normal left ventricular size, ejection fraction, and wall motion.   ECG INTERPRETATION   Negative stress ECG for ischemia.     10/09/2018 EKG: Normal sinus rhythm, rate 61.  Inferior posterior myocardial infarction.  Reviewed by myself.    Assessment:     1. Coronary artery disease involving native coronary artery of native heart without angina pectoris     2. Coronary artery disease, occlusive     3. History of myocardial infarction     4. Dyslipidemia     5. S/P CABG (coronary artery bypass graft)     6. S/P coronary artery stent placement     7. Tobacco abuse         Medical Decision Making:  Today's Assessment / Status / Plan:     1.  The patient's chest pain is nonischemic.  2.  The patient's cardiac status is currently on a comprehensive cardiovascular medical regimen.  3.  The patient was again counseled to stop smoking which he states that he is not plan to do since he enjoys smoking cigarettes and states that he is tried the nicotine gum, patch, hypnotism, Chantix.  4.  I reviewed his previous MPI and echocardiogram.  5.  Follow-up 6 months.

## 2019-10-08 ENCOUNTER — OFFICE VISIT (OUTPATIENT)
Dept: PULMONOLOGY | Facility: HOSPICE | Age: 74
End: 2019-10-08
Payer: MEDICARE

## 2019-10-08 VITALS
WEIGHT: 166 LBS | RESPIRATION RATE: 14 BRPM | SYSTOLIC BLOOD PRESSURE: 124 MMHG | BODY MASS INDEX: 26.06 KG/M2 | HEART RATE: 71 BPM | HEIGHT: 67 IN | DIASTOLIC BLOOD PRESSURE: 62 MMHG

## 2019-10-08 DIAGNOSIS — I25.10 CORONARY ARTERY DISEASE INVOLVING NATIVE CORONARY ARTERY OF NATIVE HEART WITHOUT ANGINA PECTORIS: ICD-10-CM

## 2019-10-08 DIAGNOSIS — G47.33 OSA (OBSTRUCTIVE SLEEP APNEA): ICD-10-CM

## 2019-10-08 DIAGNOSIS — J43.1 PANLOBULAR EMPHYSEMA (HCC): ICD-10-CM

## 2019-10-08 DIAGNOSIS — Z72.0 TOBACCO ABUSE: ICD-10-CM

## 2019-10-08 DIAGNOSIS — Z87.891 PERSONAL HISTORY OF NICOTINE DEPENDENCE: ICD-10-CM

## 2019-10-08 PROCEDURE — 99214 OFFICE O/P EST MOD 30 MIN: CPT | Performed by: NURSE PRACTITIONER

## 2019-10-08 ASSESSMENT — ENCOUNTER SYMPTOMS
SHORTNESS OF BREATH: 1
WHEEZING: 0
PSYCHIATRIC NEGATIVE: 1
SINUS PAIN: 0
HEMOPTYSIS: 0
CARDIOVASCULAR NEGATIVE: 1
GASTROINTESTINAL NEGATIVE: 1
SORE THROAT: 0
NEUROLOGICAL NEGATIVE: 1
BRUISES/BLEEDS EASILY: 0
SPUTUM PRODUCTION: 0
EYE PAIN: 0
MUSCULOSKELETAL NEGATIVE: 1
CONSTITUTIONAL NEGATIVE: 1
EYE DISCHARGE: 0
COUGH: 0
STRIDOR: 0

## 2019-10-08 NOTE — PROGRESS NOTES
Chief Complaint   Patient presents with   • Follow-Up     Emphysema         HPI: This patient is a 73 y.o. male, who presents for six-month follow-up COPD, chronic respiratory failure.     He is a current everyday smoker, 60-pack-year history, adamantly declines smoking cessation.  He is aware of the risks.  Fredy has tried multiple interventions for smoking cessation including Chantix, hypnosis, nicotine patches and gum without benefit.    former PFTs show mild changes with an FEV1 of 2.64 L 86% predicted. His PCP obtained chest CT in September 2018 which indicates Patchy nonspecific pulmonary fibrotic changes again seen and similar to prior exam (2016) with emphysematous changes also noted. No suspicious pulmonary nodule or mass.  Stable left adrenal gland.  Probable bilateral kidney cyst.      PFTs show moderate obstructive lung disease with an FEV1 1.98 L 67% predicted, FEV1 FVC ratio 55, DLCO 76% predicted.  DLCO was normal at 106% predicted.  FEV1 improved to 2.37 L 80% predicted after albuterol.  Patient's not been using Symbicort or Spiriva because he was unable to get samples.  He reports exertional dyspnea, intermittent cough and wheeze.     He has a history of AMIRA intolerant to CPAP.   He uses 2 L of oxygen nocturnally.  He says [facility] [place] trying to get a hold of us for additional documentation.    He has a history of coronary artery disease S/P stent placement, managed by Dr. oTng.  He was recently seen in the emergency department for chest pain.  He left AMA.    Past Medical History:   Diagnosis Date   • Arthritis    • COPD    • EMPHYSEMA    • Heart attack (HCC) 12/02/2017    received 3 stents, [facility]   • Hyperlipidemia 4/17/2012       Social History     Tobacco Use   • Smoking status: Current Every Day Smoker     Packs/day: 1.00     Years: 60.00     Pack years: 60.00     Types: Cigarettes     Start date: 6/15/1957   • Smokeless tobacco: Former User     Types: Chew      Quit date: 1957   Substance Use Topics   • Alcohol use: No     Alcohol/week: 0.0 oz     Comment: quit 28yrs ago   • Drug use: No     Types: Marijuana     Comment: quit in 1969, THC Hash        Family History   Problem Relation Age of Onset   • Arthritis Mother    • Lung Disease Mother    • Osteoporosis Mother    • Alzheimer's Disease Mother    • Other Mother         parkinsons   • Kidney Disease Father    • Cancer Neg Hx    • Diabetes Neg Hx    • Heart Disease Neg Hx    • Stroke Neg Hx        Immunization History   Administered Date(s) Administered   • Hep A/HEP B Combined Vaccine (TwinRix) 07/06/2006   • Influenza Seasonal Injectable 10/24/2013, 11/01/2014   • Influenza Vaccine Adult HD 10/25/2018   • Influenza Vaccine Quad Inj (Preserved) 11/02/2015, 10/20/2016   • Pneumococcal Conjugate Vaccine (Prevnar/PCV-13) 09/28/2015   • Pneumococcal polysaccharide vaccine (PPSV-23) 12/18/2013   • Tdap Vaccine 07/06/2006, 12/30/2015       Current medications as of today   Current Outpatient Medications   Medication Sig Dispense Refill   • Fluticasone-Umeclidin-Vilant (TRELEGY ELLIPTA) 100-62.5-25 MCG/INH AEROSOL POWDER, BREATH ACTIVATED Inhale 1 Puff by mouth every day. 2 Each 0   • atorvastatin (LIPITOR) 40 MG Tab Take 1 Tab by mouth every day. 30 Tab 11   • metoprolol SR (TOPROL XL) 50 MG TABLET SR 24 HR Take 1 Tab by mouth every day. 30 Tab 11   • lisinopril (PRINIVIL) 5 MG Tab Take 1 Tab by mouth every day. 30 Tab 11   • nitroglycerin (NITROSTAT) 0.4 MG SL Tab ONE TABLET UNDER TONGUE AS NEEDED FOR CHEST PAIN 25 Tab 0   • aspirin EC (ECOTRIN) 81 MG Tablet Delayed Response Take 81 mg by mouth every day.     • albuterol 108 (90 BASE) MCG/ACT Aero Soln inhalation aerosol Inhale 2 Puffs by mouth every 6 hours as needed for Shortness of Breath. 8.5 g 11   • MethylPREDNISolone (MEDROL DOSEPAK) 4 MG Tablet Therapy Pack Use as directed 1 Kit 0     No current facility-administered medications for this visit.        Allergies:  "Chantix [varenicline] and Fish    /62 (BP Location: Left arm, Patient Position: Sitting, BP Cuff Size: Adult)   Pulse 71   Resp 14   Ht 1.702 m (5' 7\")   Wt 75.3 kg (166 lb)       Review of Systems   Constitutional: Negative.         + poor dentition   HENT: Positive for congestion. Negative for ear discharge, ear pain, hearing loss, nosebleeds, sinus pain, sore throat and tinnitus.    Eyes: Negative for pain and discharge.   Respiratory: Positive for shortness of breath. Negative for cough, hemoptysis, sputum production, wheezing and stridor.    Cardiovascular: Negative.    Gastrointestinal: Negative.    Musculoskeletal: Negative.    Neurological: Negative.    Endo/Heme/Allergies: Negative for environmental allergies. Does not bruise/bleed easily.   Psychiatric/Behavioral: Negative.        Physical Exam   Constitutional: He is oriented to person, place, and time and well-developed, well-nourished, and in no distress.   Cigarette odor   HENT:   Head: Normocephalic and atraumatic.   Poor dentition   Eyes: Pupils are equal, round, and reactive to light.   Neck: Normal range of motion. Neck supple. No tracheal deviation present.   Cardiovascular: Normal rate, regular rhythm and normal heart sounds.   Pulmonary/Chest: Effort normal and breath sounds normal.   Diminished breath sounds throughout otherwise clear   Musculoskeletal: Normal range of motion.   Neurological: He is alert and oriented to person, place, and time. Gait normal.   Skin: Skin is warm and dry.   Psychiatric: Mood, memory, affect and judgment normal.   Vitals reviewed.      Diagnoses/Plan:    1. Panlobular emphysema (HCC)  Patient has advanced lung disease, he would benefit from once daily dosing with Trelegy Ellipta.  He is dependent on samples due to cost.  He has been off of Spiriva and Symbicort for the past month because he was unable to obtain samples.  He notes increased dyspnea.  I sent a prescription of Trelegy to the sample clinic.  " He will call for refills.  He was instructed on use.  He will continue albuterol when needed.  - Fluticasone-Umeclidin-Vilant (TRELEGY ELLIPTA) 100-62.5-25 MCG/INH AEROSOL POWDER, BREATH ACTIVATED; Inhale 1 Puff by mouth every day.  Dispense: 2 Each; Refill: 0  - REFERRAL TO LUNG CANCER SCREENING PROGRAM    2. AMIRA (obstructive sleep apnea)  Intolerant to CPAP therapy, uses nocturnal oxygen.  Our office will contact [facility] to determine what is necessary from us.    3. Tobacco abuse  Permanent smoking cessation strongly encouraged.  Again patient declines cessation.  He is aware of risks.  Given his ongoing smoking I did recommend referral to our lung cancer screening program.  He is amendable to this.  - REFERRAL TO LUNG CANCER SCREENING PROGRAM    4. Personal history of nicotine dependence   - REFERRAL TO LUNG CANCER SCREENING PROGRAM    5. Coronary artery disease involving native coronary artery of native heart without angina pectoris  Stable, compliant with Lipitor, managed by cardiology.    He will follow-up in 6 months at the pulmonary clinic, sooner if needed        This dictation was created using voice recognition software. The accuracy of the dictation is limited to the abilities of the software. I expect there may be some errors of grammar and possibly content.

## 2019-10-14 DIAGNOSIS — Z12.2 ENCOUNTER FOR SCREENING FOR MALIGNANT NEOPLASM OF RESPIRATORY ORGANS: ICD-10-CM

## 2019-10-14 DIAGNOSIS — Z87.891 PERSONAL HISTORY OF NICOTINE DEPENDENCE: ICD-10-CM

## 2019-11-14 ENCOUNTER — HOSPITAL ENCOUNTER (OUTPATIENT)
Dept: LAB | Facility: MEDICAL CENTER | Age: 74
End: 2019-11-14
Attending: FAMILY MEDICINE
Payer: MEDICARE

## 2019-11-14 ENCOUNTER — TELEPHONE (OUTPATIENT)
Dept: MEDICAL GROUP | Facility: MEDICAL CENTER | Age: 74
End: 2019-11-14

## 2019-11-14 DIAGNOSIS — N18.30 CHRONIC RENAL FAILURE, STAGE 3 (MODERATE) (HCC): ICD-10-CM

## 2019-11-14 LAB
ANION GAP SERPL CALC-SCNC: 7 MMOL/L (ref 0–11.9)
BUN SERPL-MCNC: 16 MG/DL (ref 8–22)
CALCIUM SERPL-MCNC: 8.9 MG/DL (ref 8.5–10.5)
CHLORIDE SERPL-SCNC: 108 MMOL/L (ref 96–112)
CO2 SERPL-SCNC: 25 MMOL/L (ref 20–33)
CREAT SERPL-MCNC: 1.44 MG/DL (ref 0.5–1.4)
GLUCOSE SERPL-MCNC: 107 MG/DL (ref 65–99)
POTASSIUM SERPL-SCNC: 4.3 MMOL/L (ref 3.6–5.5)
SODIUM SERPL-SCNC: 140 MMOL/L (ref 135–145)

## 2019-11-14 PROCEDURE — 80048 BASIC METABOLIC PNL TOTAL CA: CPT

## 2019-11-14 PROCEDURE — 36415 COLL VENOUS BLD VENIPUNCTURE: CPT

## 2019-11-18 ENCOUNTER — OFFICE VISIT (OUTPATIENT)
Dept: MEDICAL GROUP | Facility: MEDICAL CENTER | Age: 74
End: 2019-11-18
Attending: FAMILY MEDICINE
Payer: MEDICARE

## 2019-11-18 VITALS
TEMPERATURE: 97.1 F | RESPIRATION RATE: 16 BRPM | WEIGHT: 170 LBS | BODY MASS INDEX: 26.68 KG/M2 | HEART RATE: 76 BPM | HEIGHT: 67 IN | SYSTOLIC BLOOD PRESSURE: 100 MMHG | DIASTOLIC BLOOD PRESSURE: 62 MMHG | OXYGEN SATURATION: 97 %

## 2019-11-18 DIAGNOSIS — I73.9 PVD (PERIPHERAL VASCULAR DISEASE) (HCC): ICD-10-CM

## 2019-11-18 DIAGNOSIS — N18.30 CHRONIC RENAL FAILURE, STAGE 3 (MODERATE) (HCC): ICD-10-CM

## 2019-11-18 DIAGNOSIS — M48.061 FORAMINAL STENOSIS OF LUMBAR REGION: ICD-10-CM

## 2019-11-18 DIAGNOSIS — M19.90 ARTHRITIS: ICD-10-CM

## 2019-11-18 DIAGNOSIS — Z23 NEED FOR VACCINATION: ICD-10-CM

## 2019-11-18 PROCEDURE — 99213 OFFICE O/P EST LOW 20 MIN: CPT | Performed by: FAMILY MEDICINE

## 2019-11-18 PROCEDURE — 90471 IMMUNIZATION ADMIN: CPT | Performed by: FAMILY MEDICINE

## 2019-11-18 RX ORDER — HYDROCODONE BITARTRATE AND ACETAMINOPHEN 5; 325 MG/1; MG/1
1 TABLET ORAL EVERY 8 HOURS PRN
Qty: 90 TAB | Refills: 0 | Status: SHIPPED | OUTPATIENT
Start: 2019-12-03 | End: 2019-11-18 | Stop reason: SDUPTHER

## 2019-11-18 RX ORDER — ALBUTEROL SULFATE 90 UG/1
2 AEROSOL, METERED RESPIRATORY (INHALATION) EVERY 6 HOURS PRN
Qty: 8.5 G | Refills: 6 | Status: SHIPPED | OUTPATIENT
Start: 2019-11-18 | End: 2019-12-01

## 2019-11-18 RX ORDER — HYDROCODONE BITARTRATE AND ACETAMINOPHEN 5; 325 MG/1; MG/1
1 TABLET ORAL EVERY 8 HOURS PRN
Qty: 90 TAB | Refills: 0 | Status: SHIPPED | OUTPATIENT
Start: 2020-02-03 | End: 2020-02-18 | Stop reason: SDUPTHER

## 2019-11-18 RX ORDER — HYDROCODONE BITARTRATE AND ACETAMINOPHEN 5; 325 MG/1; MG/1
1 TABLET ORAL EVERY 8 HOURS PRN
Qty: 90 TAB | Refills: 0 | Status: SHIPPED | OUTPATIENT
Start: 2020-01-03 | End: 2019-11-18 | Stop reason: SDUPTHER

## 2019-11-18 NOTE — PROGRESS NOTES
"Subjective:      Fredy Gonsalez Jr. is a 74 y.o. male who presents with Arthritis            HPI 1.  Chronic renal failure-recent creatinine has risen modestly back up to 1.4.  Had been down around 1.10 over the past 4 months.  Patient reports that he is taking his usual amount of fluids although reports that the only about 24 ounces of free water and then 2 pots of coffee per day.  Not reporting any oliguria or dysuria.  2.  Peripheral vascular disease-patient continues to experience pain in his hips associated with walking which likely represents a version of arterial ischemia.  Unfortunately still smoking about 1 pack of cigarettes per day.  Has been unsuccessful in numerous different attempts to try and quit smoking.    ROS denies palpitations, ankle edema, loss of appetite       Objective:     /62 (BP Location: Left arm, Patient Position: Sitting, BP Cuff Size: Adult)   Pulse 76   Temp 36.2 °C (97.1 °F) (Temporal)   Resp 16   Ht 1.702 m (5' 7\")   Wt 77.1 kg (170 lb)   SpO2 97%   BMI 26.63 kg/m²      Physical Exam  Gen.- alert, cooperative, in no acute distress  Neck- midline trachea, thyroid not enlarged or tender,supple, no cervical adenopathy  Chest-clear to auscultation and percussion with normal breath sounds. No retractions. Chest wall nontender  Cardiac- regular rhythm and rate. No murmur, thrill, or heave  Lower extremities-trace pretibial edema without redness or tenderness          Assessment/Plan:       1. Need for vaccination    - Influenza Vaccine, High Dose (65+ Only)    2. Chronic renal failure, stage 3 (moderate) ([facility])      3. Arthritis    - HYDROcodone-acetaminophen (NORCO) 5-325 MG Tab per tablet; Take 1 Tab by mouth every 8 hours as needed for up to 30 days. Prescription must last 30 days.  Dispense: 90 Tab; Refill: 0    4. Foraminal stenosis of lumbar region  - HYDROcodone-acetaminophen (NORCO) 5-325 MG Tab per tablet; Take 1 Tab by mouth every 8 hours as needed for up to " 30 days. Prescription must last 30 days.  Dispense: 90 Tab; Refill: 0    5. PVD (peripheral vascular disease) (HCC)    Plan: 1.  Flu vaccine today  2.  Renew Norco 5/325 up to 3 times per day use for chronic hip pain  3.  Patient is again encouraged to reduce tobacco  4.  And at least additional 12 ounces of water intake daily with repeat BMP 1 month

## 2019-11-18 NOTE — LETTER
November 18, 2019    Re:    Fredy Gonsalez Jr.  [address]  [address]  [facility] [address]        Dear Fredy Montgomery will need the use of a single-point cane indefinitely due to left knee dysfunction.            Sincerely,        Robert Lindo M.D.    Electronically Signed

## 2019-12-01 ENCOUNTER — APPOINTMENT (OUTPATIENT)
Dept: RADIOLOGY | Facility: MEDICAL CENTER | Age: 74
End: 2019-12-01
Attending: EMERGENCY MEDICINE
Payer: MEDICARE

## 2019-12-01 ENCOUNTER — HOSPITAL ENCOUNTER (OUTPATIENT)
Facility: MEDICAL CENTER | Age: 74
End: 2019-12-02
Attending: EMERGENCY MEDICINE | Admitting: INTERNAL MEDICINE
Payer: MEDICARE

## 2019-12-01 DIAGNOSIS — Z72.0 TOBACCO ABUSE: ICD-10-CM

## 2019-12-01 DIAGNOSIS — J43.1 PANLOBULAR EMPHYSEMA (HCC): ICD-10-CM

## 2019-12-01 LAB
ALBUMIN SERPL BCP-MCNC: 3.9 G/DL (ref 3.2–4.9)
ALBUMIN/GLOB SERPL: 1.6 G/DL
ALP SERPL-CCNC: 62 U/L (ref 30–99)
ALT SERPL-CCNC: 11 U/L (ref 2–50)
ANION GAP SERPL CALC-SCNC: 8 MMOL/L (ref 0–11.9)
AST SERPL-CCNC: 12 U/L (ref 12–45)
BASOPHILS # BLD AUTO: 0.7 % (ref 0–1.8)
BASOPHILS # BLD: 0.06 K/UL (ref 0–0.12)
BILIRUB SERPL-MCNC: 0.4 MG/DL (ref 0.1–1.5)
BUN SERPL-MCNC: 15 MG/DL (ref 8–22)
CALCIUM SERPL-MCNC: 8.7 MG/DL (ref 8.5–10.5)
CHLORIDE SERPL-SCNC: 109 MMOL/L (ref 96–112)
CO2 SERPL-SCNC: 24 MMOL/L (ref 20–33)
CREAT SERPL-MCNC: 1.34 MG/DL (ref 0.5–1.4)
EKG IMPRESSION: NORMAL
EOSINOPHIL # BLD AUTO: 0.56 K/UL (ref 0–0.51)
EOSINOPHIL NFR BLD: 6.5 % (ref 0–6.9)
ERYTHROCYTE [DISTWIDTH] IN BLOOD BY AUTOMATED COUNT: 51 FL (ref 35.9–50)
FLUAV RNA SPEC QL NAA+PROBE: NEGATIVE
FLUBV RNA SPEC QL NAA+PROBE: NEGATIVE
GLOBULIN SER CALC-MCNC: 2.5 G/DL (ref 1.9–3.5)
GLUCOSE SERPL-MCNC: 139 MG/DL (ref 65–99)
HCT VFR BLD AUTO: 46.3 % (ref 42–52)
HGB BLD-MCNC: 15.1 G/DL (ref 14–18)
IMM GRANULOCYTES # BLD AUTO: 0.04 K/UL (ref 0–0.11)
IMM GRANULOCYTES NFR BLD AUTO: 0.5 % (ref 0–0.9)
LYMPHOCYTES # BLD AUTO: 2.41 K/UL (ref 1–4.8)
LYMPHOCYTES NFR BLD: 28.1 % (ref 22–41)
MCH RBC QN AUTO: 34.2 PG (ref 27–33)
MCHC RBC AUTO-ENTMCNC: 32.6 G/DL (ref 33.7–35.3)
MCV RBC AUTO: 105 FL (ref 81.4–97.8)
MONOCYTES # BLD AUTO: 0.82 K/UL (ref 0–0.85)
MONOCYTES NFR BLD AUTO: 9.6 % (ref 0–13.4)
NEUTROPHILS # BLD AUTO: 4.68 K/UL (ref 1.82–7.42)
NEUTROPHILS NFR BLD: 54.6 % (ref 44–72)
NRBC # BLD AUTO: 0 K/UL
NRBC BLD-RTO: 0 /100 WBC
NT-PROBNP SERPL IA-MCNC: 464 PG/ML (ref 0–125)
PLATELET # BLD AUTO: 188 K/UL (ref 164–446)
PMV BLD AUTO: 10.5 FL (ref 9–12.9)
POTASSIUM SERPL-SCNC: 4.4 MMOL/L (ref 3.6–5.5)
PROT SERPL-MCNC: 6.4 G/DL (ref 6–8.2)
RBC # BLD AUTO: 4.41 M/UL (ref 4.7–6.1)
SODIUM SERPL-SCNC: 141 MMOL/L (ref 135–145)
TROPONIN T SERPL-MCNC: 14 NG/L (ref 6–19)
WBC # BLD AUTO: 8.6 K/UL (ref 4.8–10.8)

## 2019-12-01 PROCEDURE — 84484 ASSAY OF TROPONIN QUANT: CPT

## 2019-12-01 PROCEDURE — 94640 AIRWAY INHALATION TREATMENT: CPT

## 2019-12-01 PROCEDURE — 85025 COMPLETE CBC W/AUTO DIFF WBC: CPT

## 2019-12-01 PROCEDURE — 71045 X-RAY EXAM CHEST 1 VIEW: CPT

## 2019-12-01 PROCEDURE — 700101 HCHG RX REV CODE 250: Performed by: EMERGENCY MEDICINE

## 2019-12-01 PROCEDURE — 304561 HCHG STAT O2

## 2019-12-01 PROCEDURE — 87502 INFLUENZA DNA AMP PROBE: CPT

## 2019-12-01 PROCEDURE — 700111 HCHG RX REV CODE 636 W/ 250 OVERRIDE (IP): Performed by: EMERGENCY MEDICINE

## 2019-12-01 PROCEDURE — 36415 COLL VENOUS BLD VENIPUNCTURE: CPT

## 2019-12-01 PROCEDURE — 80053 COMPREHEN METABOLIC PANEL: CPT

## 2019-12-01 PROCEDURE — 99285 EMERGENCY DEPT VISIT HI MDM: CPT

## 2019-12-01 PROCEDURE — 93005 ELECTROCARDIOGRAM TRACING: CPT | Performed by: EMERGENCY MEDICINE

## 2019-12-01 PROCEDURE — 83880 ASSAY OF NATRIURETIC PEPTIDE: CPT

## 2019-12-01 PROCEDURE — 94760 N-INVAS EAR/PLS OXIMETRY 1: CPT

## 2019-12-01 RX ORDER — LISINOPRIL 5 MG/1
5 TABLET ORAL DAILY
COMMUNITY
End: 2020-03-26 | Stop reason: SDUPTHER

## 2019-12-01 RX ORDER — PREDNISONE 20 MG/1
60 TABLET ORAL ONCE
Status: COMPLETED | OUTPATIENT
Start: 2019-12-01 | End: 2019-12-01

## 2019-12-01 RX ORDER — METOPROLOL SUCCINATE 50 MG/1
50 TABLET, EXTENDED RELEASE ORAL DAILY
COMMUNITY
End: 2020-03-26 | Stop reason: SDUPTHER

## 2019-12-01 RX ORDER — ATORVASTATIN CALCIUM 40 MG/1
40 TABLET, FILM COATED ORAL NIGHTLY
COMMUNITY
End: 2020-03-26 | Stop reason: SDUPTHER

## 2019-12-01 RX ADMIN — ALBUTEROL SULFATE 5 MG: 2.5 SOLUTION RESPIRATORY (INHALATION) at 21:47

## 2019-12-01 RX ADMIN — PREDNISONE 60 MG: 20 TABLET ORAL at 22:01

## 2019-12-01 ASSESSMENT — COPD QUESTIONNAIRES
COPD SCREENING SCORE: 6
DURING THE PAST 4 WEEKS HOW MUCH DID YOU FEEL SHORT OF BREATH: SOME OF THE TIME
DO YOU EVER COUGH UP ANY MUCUS OR PHLEGM?: NO/ONLY WITH OCCASIONAL COLDS OR INFECTIONS
HAVE YOU SMOKED AT LEAST 100 CIGARETTES IN YOUR ENTIRE LIFE: YES

## 2019-12-01 ASSESSMENT — LIFESTYLE VARIABLES: EVER_SMOKED: YES

## 2019-12-02 ENCOUNTER — PATIENT OUTREACH (OUTPATIENT)
Dept: HEALTH INFORMATION MANAGEMENT | Facility: OTHER | Age: 74
End: 2019-12-02

## 2019-12-02 VITALS
HEIGHT: 67 IN | BODY MASS INDEX: 26.4 KG/M2 | OXYGEN SATURATION: 97 % | HEART RATE: 109 BPM | DIASTOLIC BLOOD PRESSURE: 46 MMHG | RESPIRATION RATE: 20 BRPM | WEIGHT: 168.21 LBS | TEMPERATURE: 98.7 F | SYSTOLIC BLOOD PRESSURE: 89 MMHG

## 2019-12-02 PROBLEM — J96.01 ACUTE RESPIRATORY FAILURE WITH HYPOXIA (HCC): Status: RESOLVED | Noted: 2019-12-02 | Resolved: 2019-12-02

## 2019-12-02 PROBLEM — J96.01 ACUTE RESPIRATORY FAILURE WITH HYPOXIA (HCC): Status: ACTIVE | Noted: 2019-12-02

## 2019-12-02 PROBLEM — I10 ESSENTIAL HYPERTENSION: Status: ACTIVE | Noted: 2019-12-02

## 2019-12-02 PROBLEM — E83.42 HYPOMAGNESEMIA: Status: RESOLVED | Noted: 2019-12-02 | Resolved: 2019-12-02

## 2019-12-02 PROBLEM — E53.8 VITAMIN B12 DEFICIENCY: Status: ACTIVE | Noted: 2019-12-02

## 2019-12-02 PROBLEM — E83.42 HYPOMAGNESEMIA: Status: ACTIVE | Noted: 2019-12-02

## 2019-12-02 PROBLEM — R73.9 HYPERGLYCEMIA: Status: ACTIVE | Noted: 2019-12-02

## 2019-12-02 LAB
ANION GAP SERPL CALC-SCNC: 11 MMOL/L (ref 0–11.9)
BASOPHILS # BLD AUTO: 0.1 % (ref 0–1.8)
BASOPHILS # BLD: 0.01 K/UL (ref 0–0.12)
BUN SERPL-MCNC: 22 MG/DL (ref 8–22)
CALCIUM SERPL-MCNC: 7.5 MG/DL (ref 8.5–10.5)
CHLORIDE SERPL-SCNC: 111 MMOL/L (ref 96–112)
CO2 SERPL-SCNC: 18 MMOL/L (ref 20–33)
CREAT SERPL-MCNC: 1.71 MG/DL (ref 0.5–1.4)
EOSINOPHIL # BLD AUTO: 0 K/UL (ref 0–0.51)
EOSINOPHIL NFR BLD: 0 % (ref 0–6.9)
ERYTHROCYTE [DISTWIDTH] IN BLOOD BY AUTOMATED COUNT: 51.7 FL (ref 35.9–50)
EST. AVERAGE GLUCOSE BLD GHB EST-MCNC: 126 MG/DL
GLUCOSE SERPL-MCNC: 154 MG/DL (ref 65–99)
HBA1C MFR BLD: 6 % (ref 0–5.6)
HCT VFR BLD AUTO: 39.7 % (ref 42–52)
HGB BLD-MCNC: 13 G/DL (ref 14–18)
IMM GRANULOCYTES # BLD AUTO: 0.04 K/UL (ref 0–0.11)
IMM GRANULOCYTES NFR BLD AUTO: 0.5 % (ref 0–0.9)
LYMPHOCYTES # BLD AUTO: 0.63 K/UL (ref 1–4.8)
LYMPHOCYTES NFR BLD: 7.6 % (ref 22–41)
MAGNESIUM SERPL-MCNC: 1.7 MG/DL (ref 1.5–2.5)
MCH RBC QN AUTO: 34.3 PG (ref 27–33)
MCHC RBC AUTO-ENTMCNC: 32.7 G/DL (ref 33.7–35.3)
MCV RBC AUTO: 104.7 FL (ref 81.4–97.8)
MONOCYTES # BLD AUTO: 0.11 K/UL (ref 0–0.85)
MONOCYTES NFR BLD AUTO: 1.3 % (ref 0–13.4)
NEUTROPHILS # BLD AUTO: 7.45 K/UL (ref 1.82–7.42)
NEUTROPHILS NFR BLD: 90.5 % (ref 44–72)
NRBC # BLD AUTO: 0 K/UL
NRBC BLD-RTO: 0 /100 WBC
PHOSPHATE SERPL-MCNC: 2.1 MG/DL (ref 2.5–4.5)
PLATELET # BLD AUTO: 217 K/UL (ref 164–446)
PMV BLD AUTO: 9.9 FL (ref 9–12.9)
POTASSIUM SERPL-SCNC: 3.7 MMOL/L (ref 3.6–5.5)
PROCALCITONIN SERPL-MCNC: <0.05 NG/ML
RBC # BLD AUTO: 3.79 M/UL (ref 4.7–6.1)
SODIUM SERPL-SCNC: 140 MMOL/L (ref 135–145)
VIT B12 SERPL-MCNC: 263 PG/ML (ref 211–911)
WBC # BLD AUTO: 8.2 K/UL (ref 4.8–10.8)

## 2019-12-02 PROCEDURE — A9270 NON-COVERED ITEM OR SERVICE: HCPCS | Performed by: INTERNAL MEDICINE

## 2019-12-02 PROCEDURE — 96372 THER/PROPH/DIAG INJ SC/IM: CPT | Mod: XU

## 2019-12-02 PROCEDURE — G0378 HOSPITAL OBSERVATION PER HR: HCPCS

## 2019-12-02 PROCEDURE — 83735 ASSAY OF MAGNESIUM: CPT

## 2019-12-02 PROCEDURE — 99236 HOSP IP/OBS SAME DATE HI 85: CPT | Mod: 25 | Performed by: INTERNAL MEDICINE

## 2019-12-02 PROCEDURE — 80048 BASIC METABOLIC PNL TOTAL CA: CPT

## 2019-12-02 PROCEDURE — 84100 ASSAY OF PHOSPHORUS: CPT

## 2019-12-02 PROCEDURE — 94640 AIRWAY INHALATION TREATMENT: CPT

## 2019-12-02 PROCEDURE — 82607 VITAMIN B-12: CPT

## 2019-12-02 PROCEDURE — 94669 MECHANICAL CHEST WALL OSCILL: CPT

## 2019-12-02 PROCEDURE — 99407 BEHAV CHNG SMOKING > 10 MIN: CPT | Performed by: INTERNAL MEDICINE

## 2019-12-02 PROCEDURE — 85025 COMPLETE CBC W/AUTO DIFF WBC: CPT

## 2019-12-02 PROCEDURE — 700111 HCHG RX REV CODE 636 W/ 250 OVERRIDE (IP): Performed by: INTERNAL MEDICINE

## 2019-12-02 PROCEDURE — 83036 HEMOGLOBIN GLYCOSYLATED A1C: CPT

## 2019-12-02 PROCEDURE — 36415 COLL VENOUS BLD VENIPUNCTURE: CPT

## 2019-12-02 PROCEDURE — 700111 HCHG RX REV CODE 636 W/ 250 OVERRIDE (IP): Performed by: FAMILY MEDICINE

## 2019-12-02 PROCEDURE — 700101 HCHG RX REV CODE 250: Performed by: INTERNAL MEDICINE

## 2019-12-02 PROCEDURE — 96366 THER/PROPH/DIAG IV INF ADDON: CPT

## 2019-12-02 PROCEDURE — 84145 PROCALCITONIN (PCT): CPT

## 2019-12-02 PROCEDURE — 700102 HCHG RX REV CODE 250 W/ 637 OVERRIDE(OP): Performed by: INTERNAL MEDICINE

## 2019-12-02 PROCEDURE — 94667 MNPJ CHEST WALL 1ST: CPT

## 2019-12-02 PROCEDURE — 96365 THER/PROPH/DIAG IV INF INIT: CPT

## 2019-12-02 RX ORDER — LISINOPRIL 10 MG/1
5 TABLET ORAL DAILY
Status: DISCONTINUED | OUTPATIENT
Start: 2019-12-02 | End: 2019-12-02

## 2019-12-02 RX ORDER — ACETAMINOPHEN 325 MG/1
650 TABLET ORAL EVERY 6 HOURS PRN
Status: DISCONTINUED | OUTPATIENT
Start: 2019-12-02 | End: 2019-12-02 | Stop reason: HOSPADM

## 2019-12-02 RX ORDER — POLYETHYLENE GLYCOL 3350 17 G/17G
1 POWDER, FOR SOLUTION ORAL
Status: DISCONTINUED | OUTPATIENT
Start: 2019-12-02 | End: 2019-12-02 | Stop reason: HOSPADM

## 2019-12-02 RX ORDER — BISACODYL 10 MG
10 SUPPOSITORY, RECTAL RECTAL
Status: DISCONTINUED | OUTPATIENT
Start: 2019-12-02 | End: 2019-12-02 | Stop reason: HOSPADM

## 2019-12-02 RX ORDER — CYANOCOBALAMIN 1000 UG/ML
1000 INJECTION, SOLUTION INTRAMUSCULAR; SUBCUTANEOUS ONCE
Status: COMPLETED | OUTPATIENT
Start: 2019-12-02 | End: 2019-12-02

## 2019-12-02 RX ORDER — METOPROLOL SUCCINATE 50 MG/1
50 TABLET, EXTENDED RELEASE ORAL DAILY
Status: DISCONTINUED | OUTPATIENT
Start: 2019-12-02 | End: 2019-12-02

## 2019-12-02 RX ORDER — ATORVASTATIN CALCIUM 40 MG/1
40 TABLET, FILM COATED ORAL DAILY
Status: DISCONTINUED | OUTPATIENT
Start: 2019-12-03 | End: 2019-12-02 | Stop reason: HOSPADM

## 2019-12-02 RX ORDER — PREDNISONE 1 MG/1
TABLET ORAL
Qty: 5 TAB | Refills: 0 | Status: SHIPPED | OUTPATIENT
Start: 2019-12-03 | End: 2019-12-02

## 2019-12-02 RX ORDER — PREDNISONE 20 MG/1
40 TABLET ORAL DAILY
Qty: 10 TAB | Refills: 0 | Status: SHIPPED | OUTPATIENT
Start: 2019-12-02 | End: 2019-12-07

## 2019-12-02 RX ORDER — BUDESONIDE AND FORMOTEROL FUMARATE DIHYDRATE 80; 4.5 UG/1; UG/1
2 AEROSOL RESPIRATORY (INHALATION)
Status: DISCONTINUED | OUTPATIENT
Start: 2019-12-02 | End: 2019-12-02 | Stop reason: HOSPADM

## 2019-12-02 RX ORDER — CLOPIDOGREL BISULFATE 75 MG/1
75 TABLET ORAL DAILY
COMMUNITY
End: 2020-03-26 | Stop reason: SDUPTHER

## 2019-12-02 RX ORDER — AMOXICILLIN 250 MG
2 CAPSULE ORAL 2 TIMES DAILY
Status: DISCONTINUED | OUTPATIENT
Start: 2019-12-02 | End: 2019-12-02 | Stop reason: HOSPADM

## 2019-12-02 RX ORDER — CLOPIDOGREL BISULFATE 75 MG/1
75 TABLET ORAL DAILY
Status: DISCONTINUED | OUTPATIENT
Start: 2019-12-02 | End: 2019-12-02 | Stop reason: HOSPADM

## 2019-12-02 RX ORDER — ATORVASTATIN CALCIUM 40 MG/1
40 TABLET, FILM COATED ORAL NIGHTLY
Status: DISCONTINUED | OUTPATIENT
Start: 2019-12-02 | End: 2019-12-02

## 2019-12-02 RX ORDER — HEPARIN SODIUM 5000 [USP'U]/ML
5000 INJECTION, SOLUTION INTRAVENOUS; SUBCUTANEOUS EVERY 8 HOURS
Status: DISCONTINUED | OUTPATIENT
Start: 2019-12-02 | End: 2019-12-02 | Stop reason: HOSPADM

## 2019-12-02 RX ORDER — MAGNESIUM SULFATE HEPTAHYDRATE 40 MG/ML
2 INJECTION, SOLUTION INTRAVENOUS ONCE
Status: COMPLETED | OUTPATIENT
Start: 2019-12-02 | End: 2019-12-02

## 2019-12-02 RX ORDER — IPRATROPIUM BROMIDE AND ALBUTEROL SULFATE 2.5; .5 MG/3ML; MG/3ML
3 SOLUTION RESPIRATORY (INHALATION)
Status: DISCONTINUED | OUTPATIENT
Start: 2019-12-02 | End: 2019-12-02 | Stop reason: HOSPADM

## 2019-12-02 RX ORDER — DOXYCYCLINE 100 MG/1
100 TABLET ORAL EVERY 12 HOURS
Status: DISCONTINUED | OUTPATIENT
Start: 2019-12-02 | End: 2019-12-02 | Stop reason: HOSPADM

## 2019-12-02 RX ORDER — PREDNISONE 20 MG/1
20 TABLET ORAL 2 TIMES DAILY
Qty: 10 TAB | Refills: 0 | Status: SHIPPED | OUTPATIENT
Start: 2019-12-02 | End: 2019-12-02

## 2019-12-02 RX ADMIN — IPRATROPIUM BROMIDE AND ALBUTEROL SULFATE 3 ML: .5; 3 SOLUTION RESPIRATORY (INHALATION) at 07:21

## 2019-12-02 RX ADMIN — MAGNESIUM SULFATE 2 G: 2 INJECTION INTRAVENOUS at 08:11

## 2019-12-02 RX ADMIN — DOXYCYCLINE 100 MG: 100 TABLET, FILM COATED ORAL at 05:21

## 2019-12-02 RX ADMIN — CYANOCOBALAMIN 1000 MCG: 1000 INJECTION, SOLUTION INTRAMUSCULAR; SUBCUTANEOUS at 10:06

## 2019-12-02 RX ADMIN — CLOPIDOGREL BISULFATE 75 MG: 75 TABLET ORAL at 05:22

## 2019-12-02 RX ADMIN — ASPIRIN 81 MG: 81 TABLET, COATED ORAL at 05:24

## 2019-12-02 RX ADMIN — PREDNISONE 50 MG: 20 TABLET ORAL at 05:21

## 2019-12-02 RX ADMIN — IPRATROPIUM BROMIDE AND ALBUTEROL SULFATE 3 ML: .5; 3 SOLUTION RESPIRATORY (INHALATION) at 00:52

## 2019-12-02 ASSESSMENT — LIFESTYLE VARIABLES
ON A TYPICAL DAY WHEN YOU DRINK ALCOHOL HOW MANY DRINKS DO YOU HAVE: 0
EVER FELT BAD OR GUILTY ABOUT YOUR DRINKING: NO
EVER HAD A DRINK FIRST THING IN THE MORNING TO STEADY YOUR NERVES TO GET RID OF A HANGOVER: NO
EVER_SMOKED: YES
TOTAL SCORE: 0
HAVE PEOPLE ANNOYED YOU BY CRITICIZING YOUR DRINKING: NO
AVERAGE NUMBER OF DAYS PER WEEK YOU HAVE A DRINK CONTAINING ALCOHOL: 0
TOTAL SCORE: 0
CONSUMPTION TOTAL: NEGATIVE
ALCOHOL_USE: NO
HAVE YOU EVER FELT YOU SHOULD CUT DOWN ON YOUR DRINKING: NO
HOW MANY TIMES IN THE PAST YEAR HAVE YOU HAD 5 OR MORE DRINKS IN A DAY: 0
TOTAL SCORE: 0

## 2019-12-02 ASSESSMENT — PATIENT HEALTH QUESTIONNAIRE - PHQ9
2. FEELING DOWN, DEPRESSED, IRRITABLE, OR HOPELESS: NOT AT ALL
1. LITTLE INTEREST OR PLEASURE IN DOING THINGS: NOT AT ALL
SUM OF ALL RESPONSES TO PHQ9 QUESTIONS 1 AND 2: 0
1. LITTLE INTEREST OR PLEASURE IN DOING THINGS: NOT AT ALL
SUM OF ALL RESPONSES TO PHQ9 QUESTIONS 1 AND 2: 0
2. FEELING DOWN, DEPRESSED, IRRITABLE, OR HOPELESS: NOT AT ALL

## 2019-12-02 ASSESSMENT — ENCOUNTER SYMPTOMS
SPUTUM PRODUCTION: 1
DIARRHEA: 0
FLANK PAIN: 0
BLOOD IN STOOL: 0
ABDOMINAL PAIN: 0
BRUISES/BLEEDS EASILY: 0
MYALGIAS: 0
HEADACHES: 0
FOCAL WEAKNESS: 0
SEIZURES: 0
CHILLS: 0
DIAPHORESIS: 0
NAUSEA: 0
SORE THROAT: 0
COUGH: 1
DIZZINESS: 0
WHEEZING: 1
BACK PAIN: 0
NECK PAIN: 0
VOMITING: 0
BLURRED VISION: 0
SHORTNESS OF BREATH: 1
PALPITATIONS: 0
FEVER: 0

## 2019-12-02 NOTE — PROGRESS NOTES
Pt admitted to room T207 from [facility] at 0115.  Pt  a&o x4. denies pain. Ambulating with steady gait.  [place].  On 2L o2 nc. Denies SOB. Admitted for COPD exacerbation. On RT protocol. Reports feeling better.  Pt is a current smoker. Reports he is currently trying to quit smoking. Educated regarding risks, verbalized understanding.  Refused any patch or nicotine gum.  Oriented to room call light and vitals frequency.   on monitor. Reviewed plan of care: po steroid and antibiotics, RT protocol, diet, fall precautions, skin care, labs,and pain management with patient. Admit profile done. Passed RN bedside swallow evaluation without s/sx of aspiration. All questions answered. White board updated. Verbalized understanding and agrees. Able to make needs known.

## 2019-12-02 NOTE — ED NOTES
Med rec updated and complete. Allergies reviewed. Pt denies antibiotic use in last 14 days.   All morning medications taken.  Home pharmacy [facility]    he complete.

## 2019-12-02 NOTE — CARE PLAN
Problem: Acute care of the COPD Patient  Goal: Optimal outcomes for the COPD Patient  Outcome: MET     Problem: Communication  Goal: The ability to communicate needs accurately and effectively will improve  Outcome: MET     Problem: Safety  Goal: Will remain free from injury  Outcome: MET     Problem: Knowledge Deficit  Goal: Knowledge of disease process/condition, treatment plan, diagnostic tests, and medications will improve  Outcome: MET

## 2019-12-02 NOTE — ASSESSMENT & PLAN NOTE
Patient has been started on steroids, scheduled DuoNeb nebulized breathing treatment, doxycycline, RT protocol and supplemental oxygen  Started on Symbicort  No pneumonia on imaging  Rapid influenza negative

## 2019-12-02 NOTE — H&P
Hospital Medicine History & Physical Note    Date of Service  12/2/2019    Primary Care Physician  Robert Lindo M.D.    Consultants  None    Code Status  Full code    Chief Complaint  Shortness of breath    History of Presenting Illness  74 y.o. male with a past medical history of CAD status post CABG, COPD on 2 L of nocturnal oxygen who presented 12/1/2019 with shortness of breath that started yesterday.  The patient reports a productive cough with wheezing and chest tightness.    He denies any fevers or chills.  He denies any chest pain.  He states he is unable to afford his inhalers but continues to smoke cigarettes.  He denies any nausea, vomiting or abdominal pain.    Chest x-ray interpreted by me reveals no acute cardiopulmonary process  EKG interpreted by me reveals sinus tachycardia with no ST elevation or ST depression    Review of Systems  Review of Systems   Constitutional: Negative for chills, diaphoresis and fever.   HENT: Negative for hearing loss and sore throat.    Eyes: Negative for blurred vision.   Respiratory: Positive for cough, sputum production, shortness of breath and wheezing.    Cardiovascular: Negative for chest pain, palpitations and leg swelling.   Gastrointestinal: Negative for abdominal pain, blood in stool, diarrhea, nausea and vomiting.   Genitourinary: Negative for dysuria, flank pain and urgency.   Musculoskeletal: Negative for back pain, joint pain, myalgias and neck pain.   Skin: Negative for rash.   Neurological: Negative for dizziness, focal weakness, seizures and headaches.   Endo/Heme/Allergies: Does not bruise/bleed easily.   Psychiatric/Behavioral: Negative for suicidal ideas.   All other systems reviewed and are negative.      Past Medical History   has a past medical history of Arthritis, COPD, EMPHYSEMA, Heart attack (HCC) (12/02/2017), Hyperlipidemia (4/17/2012), and Pain in joint, multiple sites. He also has no past medical history of Diabetes or  Hypertension.    Surgical History   has a past surgical history that includes open reduction and stent placement (12/02/2017).     Family History  family history includes Alzheimer's Disease in his mother; Arthritis in his mother; Kidney Disease in his father; Lung Disease in his mother; Osteoporosis in his mother; Other in his mother.     Social History   reports that he has been smoking cigarettes. He started smoking about 62 years ago. He has a 60.00 pack-year smoking history. He quit smokeless tobacco use about 62 years ago.  His smokeless tobacco use included chew. He reports that he does not drink alcohol or use drugs.    Allergies  Allergies   Allergen Reactions   • Chantix [Varenicline]      Made patient feel sick.   • Fish Vomiting       Medications  Prior to Admission Medications   Prescriptions Last Dose Informant Patient Reported? Taking?   HYDROcodone-acetaminophen (NORCO) 5-325 MG Tab per tablet 12/1/2019 at 1030 Patient No No   Sig: Take 1 Tab by mouth every 8 hours as needed for up to 30 days. Prescription must last 30 days.   albuterol 108 (90 BASE) MCG/ACT Aero Soln inhalation aerosol 12/1/2019 at 1030 Patient No No   Sig: Inhale 2 Puffs by mouth every 6 hours as needed for Shortness of Breath.   aspirin EC (ECOTRIN) 81 MG Tablet Delayed Response 12/1/2019 at 0900 Patient Yes No   Sig: Take 81 mg by mouth every day.   atorvastatin (LIPITOR) 40 MG Tab 12/1/2019 at 0900 Patient Yes Yes   Sig: Take 40 mg by mouth every evening.   clopidogrel (PLAVIX) 75 MG Tab 12/1/2019 at 0900  Yes Yes   Sig: Take 75 mg by mouth every day.   lisinopril (PRINIVIL) 5 MG Tab 12/1/2019 at 0900 Patient Yes Yes   Sig: Take 5 mg by mouth every day.   metoprolol SR (TOPROL XL) 50 MG TABLET SR 24 HR 12/1/2019 at 0900 Patient Yes Yes   Sig: Take 50 mg by mouth every day.      Facility-Administered Medications: None       Physical Exam  Temp:  [36.9 °C (98.4 °F)] 36.9 °C (98.4 °F)  Pulse:  [102-120] 102  Resp:  [20] 20  BP:  (104-133)/(48-62) 104/48  SpO2:  [97 %-100 %] 98 %    Physical Exam  Vitals signs and nursing note reviewed.   Constitutional:       General: He is not in acute distress.     Appearance: Normal appearance.   HENT:      Head: Normocephalic and atraumatic.      Nose: Nose normal.      Mouth/Throat:      Mouth: Mucous membranes are moist.   Eyes:      Extraocular Movements: Extraocular movements intact.      Conjunctiva/sclera: Conjunctivae normal.      Pupils: Pupils are equal, round, and reactive to light.   Neck:      Musculoskeletal: Normal range of motion and neck supple.   Cardiovascular:      Rate and Rhythm: Regular rhythm. Tachycardia present.      Pulses: Normal pulses.      Heart sounds: Normal heart sounds.   Pulmonary:      Effort: No respiratory distress.      Breath sounds: No rhonchi or rales.      Comments: Tachypneic  Diminished breath sounds with diffuse end expiratory wheezing  Abdominal:      General: Bowel sounds are normal. There is no distension.      Palpations: Abdomen is soft.      Tenderness: There is no tenderness.   Musculoskeletal: Normal range of motion.         General: No swelling or tenderness.   Lymphadenopathy:      Cervical: No cervical adenopathy.   Skin:     General: Skin is warm.      Coloration: Skin is not jaundiced.      Findings: No rash.   Neurological:      General: No focal deficit present.      Mental Status: He is alert and oriented to person, place, and time.      Cranial Nerves: No cranial nerve deficit.      Motor: No weakness.   Psychiatric:         Mood and Affect: Mood normal.         Behavior: Behavior normal.         Laboratory:  Recent Labs     12/01/19  2106   WBC 8.6   RBC 4.41*   HEMOGLOBIN 15.1   HEMATOCRIT 46.3   .0*   MCH 34.2*   MCHC 32.6*   RDW 51.0*   PLATELETCT 188   MPV 10.5     Recent Labs     12/01/19  2208   SODIUM 141   POTASSIUM 4.4   CHLORIDE 109   CO2 24   GLUCOSE 139*   BUN 15   CREATININE 1.34   CALCIUM 8.7     Recent Labs      12/01/19 2208   ALTSGPT 11   ASTSGOT 12   ALKPHOSPHAT 62   TBILIRUBIN 0.4   GLUCOSE 139*         Recent Labs     12/01/19 2208   NTPROBNP 464*         Recent Labs     12/01/19 2208   TROPONINT 14       Urinalysis:    No results found     Imaging:  DX-CHEST-PORTABLE (1 VIEW)   Final Result      No acute cardiac or pulmonary abnormalities are identified.            Assessment/Plan:  I anticipate this patient is appropriate for observation status at this time.    Acute exacerbation of chronic obstructive pulmonary disease (COPD) ([facility])  Assessment & Plan  Patient has been started on steroids, scheduled DuoNeb nebulized breathing treatment, doxycycline, RT protocol and supplemental oxygen  Started on Symbicort  No pneumonia on imaging  Rapid influenza negative      Dyslipidemia- (present on admission)  Assessment & Plan  Continue Lipitor    Coronary artery disease involving native coronary artery of native heart without angina pectoris- (present on admission)  Assessment & Plan  Continue aspirin, Plavix and Lipitor    Tobacco abuse- (present on admission)  Assessment & Plan  Tobacco cessation education provided for more than 11 minutes.  We discussed the risks of smoking including increased risk of heart disease, stroke, cancer and COPD. We discussed the benefits of quitting smoking. We discussed options of nicotine patch, wellbutrin and chantix.  Patient states he has tried nicotine patches, Chantix and has also been hypnotized twice to no avail.  I encouraged him to continue trying to quit.  He states he does not need a nicotine patch at this time.        VTE prophylaxis: Heparin

## 2019-12-02 NOTE — ASSESSMENT & PLAN NOTE
Tobacco cessation education provided for more than 11 minutes.  We discussed the risks of smoking including increased risk of heart disease, stroke, cancer and COPD. We discussed the benefits of quitting smoking. We discussed options of nicotine patch, wellbutrin and chantix.  Patient states he has tried nicotine patches, Chantix and has also been hypnotized twice to no avail.  I encouraged him to continue trying to quit.  He states he does not need a nicotine patch at this time.

## 2019-12-02 NOTE — ED PROVIDER NOTES
ED Provider Note    CHIEF COMPLAINT  Chief Complaint   Patient presents with   • Shortness of Breath       HPI  Fredy Gonsalez Jr. is a 74 y.o. male who presents to the emergency department chief complaint of acute shortness of breath began around 4 PM this evening.  Patient has a history of COPD and emphysema and is still smoking cigarettes.  He states he is on 2 L nasal cannula only at night and states his oxygen was not helping with his shortness of breath.  He states that he felt very feverish and hot felt like he just could not quite catch his breath.  He denies any chest pain or lower extremity edema he was given 2 rounds of albuterol amount of DuoNeb with EMS prior to arrival.  He was found to be 86% on his 2 L nasal cannula at home.  He endorses a productive cough but cannot quite get anything beyond his throat.  States is feeling a little better but still feeling slightly short of breath  Patient does not have any albuterol at home he states he cannot afford it    REVIEW OF SYSTEMS  Positives as above. Pertinent negatives include nausea vomiting fevers chills chest pain lower extremity edema easy bleeding or bruising hemoptysis jaw pain back pain arm pain  All other review of systems are negative    PAST MEDICAL HISTORY   has a past medical history of Arthritis, COPD, EMPHYSEMA, Heart attack (HCC) (12/02/2017), Hyperlipidemia (4/17/2012), and Pain in joint, multiple sites.    SOCIAL HISTORY  Social History     Tobacco Use   • Smoking status: Current Every Day Smoker     Packs/day: 1.00     Years: 60.00     Pack years: 60.00     Types: Cigarettes     Start date: 6/15/1957   • Smokeless tobacco: Former User     Types: Chew     Quit date: 1957   Substance and Sexual Activity   • Alcohol use: No     Alcohol/week: 0.0 oz     Comment: quit 28yrs ago   • Drug use: No     Types: Marijuana     Comment: quit in 1969, THC Hash    • Sexual activity: Never     Partners: Female     Birth control/protection:  "Condom       SURGICAL HISTORY   has a past surgical history that includes open reduction and stent placement (12/02/2017).    CURRENT MEDICATIONS  Home Medications    **Home medications have not yet been reviewed for this encounter**         ALLERGIES  Allergies   Allergen Reactions   • Chantix [Varenicline]      Made patient feel sick.   • Fish Vomiting       PHYSICAL EXAM  VITAL SIGNS: /62   Pulse (!) 108   Temp 36.9 °C (98.4 °F) (Temporal)   Resp 20   Ht 1.702 m (5' 7\")   Wt 77.1 kg (170 lb)   SpO2 100%   BMI 26.63 kg/m²    Pulse ox interpretation: I interpret this pulse ox as normal.  Constitutional: Alert in mild distress  HENT: Normocephalic atraumatic, MMM  Eyes: PER, Conjunctiva normal, Non-icteric.   Neck: Normal range of motion, No tenderness, Supple, No stridor.   Cardiovascular: Tachycardic regular rhythm, no murmurs.   Thorax & Lungs: Mildly tachypneic with prolonged expiratory phase diffuse wheezes bilaterally, No chest tenderness.   Abdomen: Bowel sounds normal, Soft, No tenderness, No pulsatile masses. No peritoneal signs.  Skin: Warm, Dry, No erythema, No rash.   Back: No bony tenderness, No CVA tenderness.   Extremities/MSK: Intact distal pulses, No edema, No tenderness, No cyanosis, no major deformities noted  Neurologic: Alert and oriented x3, No focal deficits noted.       DIFFERENTIAL DIAGNOSIS AND WORK UP PLAN    This is a 74 y.o. male who presents with concern for COPD acute exacerbation of bronchitis also possible fluid overload this could be cardiac wheezes though with left lower extremity edema most likely respiratory wheezing.  Will treat patient with oral steroids and a repeat nebulizer treatment.  He is on 2 L nasal cannula but he is on that currently when he is only able to be on it as night as needed.  Will evaluate for underlying infection as well    DIAGNOSTIC STUDIES / PROCEDURES    EKG  Results for orders placed or performed during the hospital encounter of 12/01/19 "   EKG (NOW)   Result Value Ref Range    Report       [facility] Emergency Dept.    Test Date:  2019  Pt Name:    TOSHIA VERGARA             Department: ER  MRN:        [id number]                      Room:       RD 03  Gender:     Male                         Technician: [id number]  :        1945                   Requested By:ADRIANA VAZQUEZ  Order #:    [id number]                    Reading MD: Adriana Vazquez MD    Measurements  Intervals                                Axis  Rate:       105                          P:          44  SD:         144                          QRS:        -8  QRSD:       106                          T:          -11  QT:         348  QTc:        461    Interpretive Statements  Sinus tachycardia rate of 105 no ST elevations or ST depressions borderline  right axis deviation borderline right bundle which is really unchanged from  prior actually an improvement in the T wave inversion in V2 otherwise normal  intervals  Compared to ECG 2019 06:58:40  Electronically Signed On 2019 23:09:00 PST by Adriana Vazquez MD         LABS  Pertinent Lab Findings  CBC with mild chronic anemia, CMP within normal limits proBNP mildly elevated troponin normal influenza negative      RADIOLOGY  DX-CHEST-PORTABLE (1 VIEW)   Final Result      No acute cardiac or pulmonary abnormalities are identified.        The radiologist's interpretation of all radiological studies have been reviewed by me.      COURSE & MEDICAL DECISION MAKING  Pertinent Labs & Imaging studies reviewed. (See chart for details)    11:08 PM  Patient is showing some mild improvement after his nebulizer treatment however requiring oxygen full-time rather than just as needed he is still mildly tachypneic in the setting of medication noncompliance we will hospitalize the patient for COPD exacerbation and medication management in the setting of hypoxia  11:12 PM  Spoke w dr bradley for hospitalization and  "he has accepted the patient    /58   Pulse (!) 110   Temp 36.2 °C (97.1 °F) (Temporal)   Resp 20   Ht 1.702 m (5' 7\")   Wt 76.3 kg (168 lb 3.4 oz)   SpO2 97%   BMI 26.35 kg/m²       DISPOSITION:  Patient will be evaluated for hospitalization by Dr Grossman in guarded condition.        FINAL IMPRESSION  1. Copd w acute exacerbation  2. Hypoxia  3.medication non compliance  4. Tobacco use         Electronically signed by: Adriana Bennett, 12/1/2019 9:31 PM    This dictation has been created using voice recognition software and/or scribes. The accuracy of the dictation is limited by the abilities of the software and the expertise of the scribes. I expect there may be some errors of grammar and possibly content. I made every attempt to manually correct the errors within my dictation. However, errors related to voice recognition software and/or scribes may still exist and should be interpreted within the appropriate context.    "

## 2019-12-02 NOTE — DISCHARGE INSTRUCTIONS
Discharge Instructions    Discharged to home by car with self. Discharged via walking, hospital escort: Yes.  Special equipment needed: Not Applicable    Be sure to schedule a follow-up appointment with your primary care doctor or any specialists as instructed.     Discharge Plan:   Diet Plan: Discussed  Activity Level: Discussed  Smoking Cessation Offered: Patient Counseled  Confirmed Follow up Appointment: Patient to Call and Schedule Appointment  Confirmed Symptoms Management: Discussed  Medication Reconciliation Updated: Yes  Influenza Vaccine Indication: Not indicated: Previously immunized this influenza season and > 8 years of age    I understand that a diet low in cholesterol, fat, and sodium is recommended for good health. Unless I have been given specific instructions below for another diet, I accept this instruction as my diet prescription.   Other diet: Regular Diet    Special Instructions: None    · Is patient discharged on Warfarin / Coumadin?   No     Depression / Suicide Risk    As you are discharged from this [facility] facility, it is important to learn how to keep safe from harming yourself.    Recognize the warning signs:  · Abrupt changes in personality, positive or negative- including increase in energy   · Giving away possessions  · Change in eating patterns- significant weight changes-  positive or negative  · Change in sleeping patterns- unable to sleep or sleeping all the time   · Unwillingness or inability to communicate  · Depression  · Unusual sadness, discouragement and loneliness  · Talk of wanting to die  · Neglect of personal appearance   · Rebelliousness- reckless behavior  · Withdrawal from people/activities they love  · Confusion- inability to concentrate     If you or a loved one observes any of these behaviors or has concerns about self-harm, here's what you can do:  · Talk about it- your feelings and reasons for harming yourself  · Remove any means that you might use to hurt  yourself (examples: pills, rope, extension cords, firearm)  · Get professional help from the community (Mental Health, Substance Abuse, psychological counseling)  · Do not be alone:Call your Safe Contact- someone whom you trust who will be there for you.  · Call your local CRISIS HOTLINE 070-1044 or 316-879-6232  · Call your local Children's Mobile Crisis Response Team [place] (392) 887-7861 or www.Houzz  · Call the toll free National Suicide Prevention Hotlines   · National Suicide Prevention Lifeline 769-732-LYKP (0766)  · [employer] Line Network 800-SUICIDE (050-6631)        FOLLOW UP ITEMS POST DISCHARGE  Please call [phone] to schedule PCP appointment for patient.    Required specialty appointments include:     -Follow up with PCP  -Follow up with Pulmonology      Discharge Instructions per Belén Martínez, A.P.R.N.    -Follow-up with PCP  -Follow-up with pulmonology  -Prescription of B12 1000 mg for 90 days and prednisone 1 mg p.o. for 5 days sent to pharmacy  -Compliance with respiratory medication  -Reinforcement and smoking cessation    DIET: Cardiac diet    ACTIVITY: As tolerated    DIAGNOSIS: Shortness of breath    Return to ER if symptoms worsens, chest pain, severe shortness of breath, palpitations, numbness, tingling, and weakness.  -------------------------------------------------------------------------------------------------------------------------------------------------------------------------  Electronically signed by:  MORIRS Martínez, MSN, APRN, FNP-C  [facility]  [facility]  12/02/19               1053

## 2019-12-02 NOTE — ED NOTES
Patient resting comfortably in stretcher at this time with no additional needs. Updated and aware of plan of care.

## 2019-12-02 NOTE — DISCHARGE SUMMARY
"Discharge Summary    CHIEF COMPLAINT ON ADMISSION  Chief Complaint   Patient presents with   • Shortness of Breath       Reason for Admission  EMS     Admission Date  12/1/2019    CODE STATUS  Full Code    HPI & HOSPITAL COURSE  Mr. Gonsalez is a 74 y.o. male with a PMH of arthritis, COPD on home O2 at 2L nocturnal oxygen, emphysema, MI, CABG in 2017 in [place], [facility], joint pain, who presented on 12/1/2019 due to severe shortness of breath.  Patient reports shortness of breath started the day prior to admission accompanied by productive cough with wheezing and chest tightness.  Patient denies any fevers, chills, no chest pain.  Patient also reports he is unable to afford his inhalers, but continues to smoke cigarettes.  Patient denies any nausea, vomiting, abdominal pain, initial chest x-ray reveals no acute cardiopulmonary process.  Initial EKG interpreted by hospitalist and shows sinus tachycardia with no ST elevation or depression.  Patient admitted into the observation unit for further evaluation.    During his hospital stay, RT protocol was initiated for breathing treatments, IV steroids, and electrolyte monitor.  Patient seen and examined this morning and reports no overnight episodes.  Patient reports being, \"back to baseline \".  Reeducation and reinforcement in smoking cessation and compliance with home medication.  At this time, patient will be discharged home.  Prescription of B12 1000 mg p.o. for 90 days, prednisone 40 mg tab p.o. for 5 days was sent to pharmacy of choice.  Patient recommended to follow-up with PCP along with compliance with respiratory medication was reinforced.  All questions and concerns answered prior to being discharged.  Patient discharged home.       Therefore, he is discharged in good and stable condition to home with close outpatient follow-up.    The patient recovered much more quickly than anticipated on admission.    Discharge Date  12/02/19      FOLLOW UP ITEMS POST " DISCHARGE  Please call [phone] to schedule PCP appointment for patient.    Required specialty appointments include:     -Follow up with PCP  -Follow up with Pulmonology      Discharge Instructions per OZIEL Adkins.[email or web address]    -Follow-up with PCP  -Follow-up with pulmonology  -Prescription of B12 1000 mg for 90 days and prednisone 40 mg p.o. for 5 days sent to pharmacy  -Compliance with respiratory medication  -Reinforcement and smoking cessation    DIET: Cardiac diet    ACTIVITY: As tolerated    DIAGNOSIS: Shortness of breath    Return to ER if symptoms worsens, chest pain, severe shortness of breath, palpitations, numbness, tingling, and weakness.    DISCHARGE DIAGNOSES  Active Problems:    Coronary artery disease involving native coronary artery of native heart without angina pectoris POA: Yes    PVD (peripheral vascular disease) ([facility]) POA: Yes    Chronic renal failure, stage 3 (moderate) ([facility]) POA: Yes    Hyperglycemia POA: Yes    Vitamin B12 deficiency POA: Unknown    Tobacco abuse POA: Yes    Dyslipidemia POA: Yes  Resolved Problems:    Acute exacerbation of chronic obstructive pulmonary disease (COPD) ([facility]) POA: Yes      Overview: Moderate obstructive lung disease    Acute respiratory failure with hypoxia ([facility]) POA: Yes    Hypomagnesemia POA: Unknown      FOLLOW UP  Future Appointments   Date Time Provider Department Center   12/9/2019  3:40 PM Robert Lindo M.D. [facility]   2/18/2020  8:00 AM Robert Lindo M.D. [facility]   4/13/2020  7:45 AM [facility] None     No follow-up provider specified.    MEDICATIONS ON DISCHARGE     Medication List      START taking these medications      Instructions   cyanocobalamin 1000 MCG Tabs  Commonly known as:  VITAMIN B12   Take 1 Tab by mouth every morning with breakfast for 90 days.  Dose:  1,000 mcg     predniSONE 20 MG Tabs  Commonly known as:  DELTASONE   Take 2 Tabs by mouth every day for 5  days.  Dose:  40 mg        CONTINUE taking these medications      Instructions   albuterol 108 (90 Base) MCG/ACT Aers inhalation aerosol   Doctor's comments:  Disp as Ventolin  Inhale 2 Puffs by mouth every 6 hours as needed for Shortness of Breath.  Dose:  2 Puff     aspirin EC 81 MG Tbec  Commonly known as:  ECOTRIN   Take 81 mg by mouth every day.  Dose:  81 mg     atorvastatin 40 MG Tabs  Commonly known as:  LIPITOR   Take 40 mg by mouth every evening.  Dose:  40 mg     clopidogrel 75 MG Tabs  Commonly known as:  PLAVIX   Take 75 mg by mouth every day.  Dose:  75 mg     HYDROcodone-acetaminophen 5-325 MG Tabs per tablet  Start taking on:  February 3, 2020  Commonly known as:  NORCO   Take 1 Tab by mouth every 8 hours as needed for up to 30 days. Prescription must last 30 days.  Dose:  1 Tab     lisinopril 5 MG Tabs  Commonly known as:  PRINIVIL   Take 5 mg by mouth every day.  Dose:  5 mg     metoprolol SR 50 MG Tb24  Commonly known as:  TOPROL XL   Take 50 mg by mouth every day.  Dose:  50 mg            Allergies  Allergies   Allergen Reactions   • Chantix [Varenicline]      Made patient feel sick.   • Fish Vomiting       DIET  Orders Placed This Encounter   Procedures   • Diet Order Cardiac     Standing Status:   Standing     Number of Occurrences:   1     Order Specific Question:   Diet:     Answer:   Cardiac [6]       ACTIVITY  As tolerated.  Weight bearing as tolerated      IMAGING  DX-CHEST-PORTABLE (1 VIEW)   Final Result      No acute cardiac or pulmonary abnormalities are identified.            LABORATORY  Lab Results   Component Value Date    SODIUM 140 12/02/2019    POTASSIUM 3.7 12/02/2019    CHLORIDE 111 12/02/2019    CO2 18 (L) 12/02/2019    GLUCOSE 154 (H) 12/02/2019    BUN 22 12/02/2019    CREATININE 1.71 (H) 12/02/2019        Lab Results   Component Value Date    WBC 8.2 12/02/2019    HEMOGLOBIN 13.0 (L) 12/02/2019    HEMATOCRIT 39.7 (L) 12/02/2019    PLATELETCT 217 12/02/2019         --------------------------------------------------------------------------------------------------------------------------------------------------------  Electronically signed by:  MORRIS Martínez, MSN, APRN, FNP-C  Hospitalist Services  [facility]  12/02/19  1056

## 2019-12-09 ENCOUNTER — OFFICE VISIT (OUTPATIENT)
Dept: MEDICAL GROUP | Facility: MEDICAL CENTER | Age: 74
End: 2019-12-09
Attending: FAMILY MEDICINE
Payer: MEDICARE

## 2019-12-09 VITALS
SYSTOLIC BLOOD PRESSURE: 120 MMHG | DIASTOLIC BLOOD PRESSURE: 60 MMHG | WEIGHT: 173.4 LBS | BODY MASS INDEX: 27.21 KG/M2 | HEIGHT: 67 IN | OXYGEN SATURATION: 97 % | RESPIRATION RATE: 16 BRPM | TEMPERATURE: 98 F | HEART RATE: 79 BPM

## 2019-12-09 DIAGNOSIS — Z53.21 PROCEDURE AND TREATMENT NOT CARRIED OUT DUE TO PATIENT LEAVING PRIOR TO BEING SEEN BY HEALTH CARE PROVIDER: ICD-10-CM

## 2019-12-23 ENCOUNTER — HOSPITAL ENCOUNTER (OUTPATIENT)
Dept: LAB | Facility: MEDICAL CENTER | Age: 74
End: 2019-12-23
Attending: FAMILY MEDICINE
Payer: MEDICARE

## 2019-12-23 DIAGNOSIS — N18.30 CHRONIC RENAL FAILURE, STAGE 3 (MODERATE) (HCC): ICD-10-CM

## 2019-12-23 LAB
ANION GAP SERPL CALC-SCNC: 7 MMOL/L (ref 0–11.9)
BUN SERPL-MCNC: 17 MG/DL (ref 8–22)
CALCIUM SERPL-MCNC: 8.5 MG/DL (ref 8.5–10.5)
CHLORIDE SERPL-SCNC: 106 MMOL/L (ref 96–112)
CO2 SERPL-SCNC: 26 MMOL/L (ref 20–33)
CREAT SERPL-MCNC: 1.29 MG/DL (ref 0.5–1.4)
GLUCOSE SERPL-MCNC: 100 MG/DL (ref 65–99)
POTASSIUM SERPL-SCNC: 4.2 MMOL/L (ref 3.6–5.5)
SODIUM SERPL-SCNC: 139 MMOL/L (ref 135–145)

## 2019-12-23 PROCEDURE — 80048 BASIC METABOLIC PNL TOTAL CA: CPT

## 2019-12-23 PROCEDURE — 36415 COLL VENOUS BLD VENIPUNCTURE: CPT

## 2020-01-11 ENCOUNTER — APPOINTMENT (OUTPATIENT)
Dept: RADIOLOGY | Facility: MEDICAL CENTER | Age: 75
DRG: 189 | End: 2020-01-11
Attending: EMERGENCY MEDICINE
Payer: MEDICARE

## 2020-01-11 ENCOUNTER — HOSPITAL ENCOUNTER (INPATIENT)
Facility: MEDICAL CENTER | Age: 75
LOS: 2 days | DRG: 189 | End: 2020-01-13
Attending: EMERGENCY MEDICINE | Admitting: INTERNAL MEDICINE
Payer: MEDICARE

## 2020-01-11 DIAGNOSIS — J44.1 ACUTE EXACERBATION OF CHRONIC OBSTRUCTIVE PULMONARY DISEASE (COPD) (HCC): ICD-10-CM

## 2020-01-11 DIAGNOSIS — R79.89 ELEVATED LACTIC ACID LEVEL: ICD-10-CM

## 2020-01-11 LAB
ALBUMIN SERPL BCP-MCNC: 4 G/DL (ref 3.2–4.9)
ALBUMIN/GLOB SERPL: 1.4 G/DL
ALP SERPL-CCNC: 59 U/L (ref 30–99)
ALT SERPL-CCNC: 12 U/L (ref 2–50)
ANION GAP SERPL CALC-SCNC: 9 MMOL/L (ref 0–11.9)
AST SERPL-CCNC: 19 U/L (ref 12–45)
BASOPHILS # BLD AUTO: 1 % (ref 0–1.8)
BASOPHILS # BLD: 0.07 K/UL (ref 0–0.12)
BILIRUB SERPL-MCNC: 0.3 MG/DL (ref 0.1–1.5)
BUN SERPL-MCNC: 24 MG/DL (ref 8–22)
CALCIUM SERPL-MCNC: 8.7 MG/DL (ref 8.5–10.5)
CHLORIDE SERPL-SCNC: 108 MMOL/L (ref 96–112)
CO2 SERPL-SCNC: 22 MMOL/L (ref 20–33)
CREAT SERPL-MCNC: 1.43 MG/DL (ref 0.5–1.4)
EKG IMPRESSION: NORMAL
EOSINOPHIL # BLD AUTO: 0.53 K/UL (ref 0–0.51)
EOSINOPHIL NFR BLD: 7.3 % (ref 0–6.9)
ERYTHROCYTE [DISTWIDTH] IN BLOOD BY AUTOMATED COUNT: 50.2 FL (ref 35.9–50)
GLOBULIN SER CALC-MCNC: 2.8 G/DL (ref 1.9–3.5)
GLUCOSE SERPL-MCNC: 91 MG/DL (ref 65–99)
HCT VFR BLD AUTO: 44.4 % (ref 42–52)
HGB BLD-MCNC: 14.3 G/DL (ref 14–18)
IMM GRANULOCYTES # BLD AUTO: 0.02 K/UL (ref 0–0.11)
IMM GRANULOCYTES NFR BLD AUTO: 0.3 % (ref 0–0.9)
LACTATE BLD-SCNC: 1.8 MMOL/L (ref 0.5–2)
LACTATE BLD-SCNC: 3.1 MMOL/L (ref 0.5–2)
LYMPHOCYTES # BLD AUTO: 2.79 K/UL (ref 1–4.8)
LYMPHOCYTES NFR BLD: 38.6 % (ref 22–41)
MCH RBC QN AUTO: 33.7 PG (ref 27–33)
MCHC RBC AUTO-ENTMCNC: 32.2 G/DL (ref 33.7–35.3)
MCV RBC AUTO: 104.7 FL (ref 81.4–97.8)
MONOCYTES # BLD AUTO: 0.72 K/UL (ref 0–0.85)
MONOCYTES NFR BLD AUTO: 10 % (ref 0–13.4)
NEUTROPHILS # BLD AUTO: 3.09 K/UL (ref 1.82–7.42)
NEUTROPHILS NFR BLD: 42.8 % (ref 44–72)
NRBC # BLD AUTO: 0 K/UL
NRBC BLD-RTO: 0 /100 WBC
NT-PROBNP SERPL IA-MCNC: 313 PG/ML (ref 0–125)
PLATELET # BLD AUTO: 196 K/UL (ref 164–446)
PMV BLD AUTO: 9.9 FL (ref 9–12.9)
POTASSIUM SERPL-SCNC: 4.8 MMOL/L (ref 3.6–5.5)
PROCALCITONIN SERPL-MCNC: <0.05 NG/ML
PROT SERPL-MCNC: 6.8 G/DL (ref 6–8.2)
RBC # BLD AUTO: 4.24 M/UL (ref 4.7–6.1)
SODIUM SERPL-SCNC: 139 MMOL/L (ref 135–145)
TROPONIN T SERPL-MCNC: 11 NG/L (ref 6–19)
WBC # BLD AUTO: 7.2 K/UL (ref 4.8–10.8)

## 2020-01-11 PROCEDURE — 700111 HCHG RX REV CODE 636 W/ 250 OVERRIDE (IP): Performed by: INTERNAL MEDICINE

## 2020-01-11 PROCEDURE — 99285 EMERGENCY DEPT VISIT HI MDM: CPT

## 2020-01-11 PROCEDURE — 700105 HCHG RX REV CODE 258: Performed by: INTERNAL MEDICINE

## 2020-01-11 PROCEDURE — 700102 HCHG RX REV CODE 250 W/ 637 OVERRIDE(OP): Performed by: INTERNAL MEDICINE

## 2020-01-11 PROCEDURE — 700105 HCHG RX REV CODE 258: Performed by: EMERGENCY MEDICINE

## 2020-01-11 PROCEDURE — 85025 COMPLETE CBC W/AUTO DIFF WBC: CPT

## 2020-01-11 PROCEDURE — 94640 AIRWAY INHALATION TREATMENT: CPT

## 2020-01-11 PROCEDURE — 700111 HCHG RX REV CODE 636 W/ 250 OVERRIDE (IP): Performed by: EMERGENCY MEDICINE

## 2020-01-11 PROCEDURE — 83605 ASSAY OF LACTIC ACID: CPT

## 2020-01-11 PROCEDURE — 87040 BLOOD CULTURE FOR BACTERIA: CPT

## 2020-01-11 PROCEDURE — 700101 HCHG RX REV CODE 250: Performed by: INTERNAL MEDICINE

## 2020-01-11 PROCEDURE — 99223 1ST HOSP IP/OBS HIGH 75: CPT | Mod: AI,25 | Performed by: INTERNAL MEDICINE

## 2020-01-11 PROCEDURE — A9270 NON-COVERED ITEM OR SERVICE: HCPCS | Performed by: INTERNAL MEDICINE

## 2020-01-11 PROCEDURE — 93005 ELECTROCARDIOGRAM TRACING: CPT

## 2020-01-11 PROCEDURE — 80053 COMPREHEN METABOLIC PANEL: CPT

## 2020-01-11 PROCEDURE — 84145 PROCALCITONIN (PCT): CPT

## 2020-01-11 PROCEDURE — 83880 ASSAY OF NATRIURETIC PEPTIDE: CPT

## 2020-01-11 PROCEDURE — 93005 ELECTROCARDIOGRAM TRACING: CPT | Performed by: EMERGENCY MEDICINE

## 2020-01-11 PROCEDURE — 99407 BEHAV CHNG SMOKING > 10 MIN: CPT | Performed by: INTERNAL MEDICINE

## 2020-01-11 PROCEDURE — 96374 THER/PROPH/DIAG INJ IV PUSH: CPT

## 2020-01-11 PROCEDURE — 770006 HCHG ROOM/CARE - MED/SURG/GYN SEMI*

## 2020-01-11 PROCEDURE — 84484 ASSAY OF TROPONIN QUANT: CPT

## 2020-01-11 PROCEDURE — 71045 X-RAY EXAM CHEST 1 VIEW: CPT

## 2020-01-11 PROCEDURE — 36415 COLL VENOUS BLD VENIPUNCTURE: CPT

## 2020-01-11 RX ORDER — ONDANSETRON 2 MG/ML
4 INJECTION INTRAMUSCULAR; INTRAVENOUS EVERY 4 HOURS PRN
Status: DISCONTINUED | OUTPATIENT
Start: 2020-01-11 | End: 2020-01-13 | Stop reason: HOSPADM

## 2020-01-11 RX ORDER — METOPROLOL SUCCINATE 25 MG/1
50 TABLET, EXTENDED RELEASE ORAL DAILY
Status: DISCONTINUED | OUTPATIENT
Start: 2020-01-12 | End: 2020-01-13 | Stop reason: HOSPADM

## 2020-01-11 RX ORDER — AMOXICILLIN 250 MG
2 CAPSULE ORAL 2 TIMES DAILY
Status: DISCONTINUED | OUTPATIENT
Start: 2020-01-12 | End: 2020-01-13 | Stop reason: HOSPADM

## 2020-01-11 RX ORDER — DEXAMETHASONE SODIUM PHOSPHATE 4 MG/ML
10 INJECTION, SOLUTION INTRA-ARTICULAR; INTRALESIONAL; INTRAMUSCULAR; INTRAVENOUS; SOFT TISSUE ONCE
Status: COMPLETED | OUTPATIENT
Start: 2020-01-11 | End: 2020-01-11

## 2020-01-11 RX ORDER — AZITHROMYCIN 250 MG/1
500 TABLET, FILM COATED ORAL DAILY
Status: DISCONTINUED | OUTPATIENT
Start: 2020-01-11 | End: 2020-01-13

## 2020-01-11 RX ORDER — ENALAPRILAT 1.25 MG/ML
1.25 INJECTION INTRAVENOUS EVERY 6 HOURS PRN
Status: DISCONTINUED | OUTPATIENT
Start: 2020-01-11 | End: 2020-01-13 | Stop reason: HOSPADM

## 2020-01-11 RX ORDER — LISINOPRIL 5 MG/1
5 TABLET ORAL DAILY
Status: DISCONTINUED | OUTPATIENT
Start: 2020-01-12 | End: 2020-01-13

## 2020-01-11 RX ORDER — METHYLPREDNISOLONE SODIUM SUCCINATE 40 MG/ML
40 INJECTION, POWDER, LYOPHILIZED, FOR SOLUTION INTRAMUSCULAR; INTRAVENOUS EVERY 6 HOURS
Status: DISCONTINUED | OUTPATIENT
Start: 2020-01-12 | End: 2020-01-13 | Stop reason: HOSPADM

## 2020-01-11 RX ORDER — SODIUM CHLORIDE 9 MG/ML
INJECTION, SOLUTION INTRAVENOUS CONTINUOUS
Status: DISCONTINUED | OUTPATIENT
Start: 2020-01-11 | End: 2020-01-11

## 2020-01-11 RX ORDER — IPRATROPIUM BROMIDE AND ALBUTEROL SULFATE 2.5; .5 MG/3ML; MG/3ML
3 SOLUTION RESPIRATORY (INHALATION)
Status: DISCONTINUED | OUTPATIENT
Start: 2020-01-11 | End: 2020-01-12

## 2020-01-11 RX ORDER — HYDROCODONE BITARTRATE AND ACETAMINOPHEN 5; 325 MG/1; MG/1
1 TABLET ORAL EVERY 8 HOURS PRN
Status: DISCONTINUED | OUTPATIENT
Start: 2020-01-11 | End: 2020-01-13 | Stop reason: HOSPADM

## 2020-01-11 RX ORDER — POLYETHYLENE GLYCOL 3350 17 G/17G
1 POWDER, FOR SOLUTION ORAL
Status: DISCONTINUED | OUTPATIENT
Start: 2020-01-11 | End: 2020-01-13 | Stop reason: HOSPADM

## 2020-01-11 RX ORDER — BISACODYL 10 MG
10 SUPPOSITORY, RECTAL RECTAL
Status: DISCONTINUED | OUTPATIENT
Start: 2020-01-11 | End: 2020-01-13 | Stop reason: HOSPADM

## 2020-01-11 RX ORDER — ACETAMINOPHEN 325 MG/1
650 TABLET ORAL EVERY 6 HOURS PRN
Status: DISCONTINUED | OUTPATIENT
Start: 2020-01-11 | End: 2020-01-13 | Stop reason: HOSPADM

## 2020-01-11 RX ORDER — SODIUM CHLORIDE, SODIUM LACTATE, POTASSIUM CHLORIDE, CALCIUM CHLORIDE 600; 310; 30; 20 MG/100ML; MG/100ML; MG/100ML; MG/100ML
INJECTION, SOLUTION INTRAVENOUS CONTINUOUS
Status: DISCONTINUED | OUTPATIENT
Start: 2020-01-11 | End: 2020-01-13 | Stop reason: HOSPADM

## 2020-01-11 RX ORDER — NITROGLYCERIN 0.4 MG/1
0.4 TABLET SUBLINGUAL
COMMUNITY
End: 2020-08-13

## 2020-01-11 RX ORDER — CLOPIDOGREL BISULFATE 75 MG/1
75 TABLET ORAL DAILY
Status: DISCONTINUED | OUTPATIENT
Start: 2020-01-12 | End: 2020-01-13 | Stop reason: HOSPADM

## 2020-01-11 RX ORDER — SODIUM CHLORIDE 9 MG/ML
1000 INJECTION, SOLUTION INTRAVENOUS ONCE
Status: COMPLETED | OUTPATIENT
Start: 2020-01-11 | End: 2020-01-11

## 2020-01-11 RX ORDER — ATORVASTATIN CALCIUM 40 MG/1
40 TABLET, FILM COATED ORAL NIGHTLY
Status: DISCONTINUED | OUTPATIENT
Start: 2020-01-11 | End: 2020-01-13 | Stop reason: HOSPADM

## 2020-01-11 RX ORDER — ONDANSETRON 4 MG/1
4 TABLET, ORALLY DISINTEGRATING ORAL EVERY 4 HOURS PRN
Status: DISCONTINUED | OUTPATIENT
Start: 2020-01-11 | End: 2020-01-13 | Stop reason: HOSPADM

## 2020-01-11 RX ADMIN — AZITHROMYCIN 500 MG: 250 TABLET, FILM COATED ORAL at 23:44

## 2020-01-11 RX ADMIN — SODIUM CHLORIDE, POTASSIUM CHLORIDE, SODIUM LACTATE AND CALCIUM CHLORIDE: 600; 310; 30; 20 INJECTION, SOLUTION INTRAVENOUS at 23:26

## 2020-01-11 RX ADMIN — SODIUM CHLORIDE 1000 ML: 9 INJECTION, SOLUTION INTRAVENOUS at 17:54

## 2020-01-11 RX ADMIN — SODIUM CHLORIDE: 9 INJECTION, SOLUTION INTRAVENOUS at 20:25

## 2020-01-11 RX ADMIN — DEXAMETHASONE SODIUM PHOSPHATE 10 MG: 4 INJECTION, SOLUTION INTRA-ARTICULAR; INTRALESIONAL; INTRAMUSCULAR; INTRAVENOUS; SOFT TISSUE at 17:54

## 2020-01-11 RX ADMIN — METHYLPREDNISOLONE SODIUM SUCCINATE 40 MG: 40 INJECTION, POWDER, FOR SOLUTION INTRAMUSCULAR; INTRAVENOUS at 23:44

## 2020-01-11 RX ADMIN — ATORVASTATIN CALCIUM 40 MG: 40 TABLET, FILM COATED ORAL at 23:44

## 2020-01-11 RX ADMIN — IPRATROPIUM BROMIDE AND ALBUTEROL SULFATE 3 ML: .5; 3 SOLUTION RESPIRATORY (INHALATION) at 23:00

## 2020-01-11 SDOH — ECONOMIC STABILITY: FOOD INSECURITY: WITHIN THE PAST 12 MONTHS, THE FOOD YOU BOUGHT JUST DIDN'T LAST AND YOU DIDN'T HAVE MONEY TO GET MORE.: NEVER TRUE

## 2020-01-11 SDOH — ECONOMIC STABILITY: TRANSPORTATION INSECURITY
IN THE PAST 12 MONTHS, HAS LACK OF TRANSPORTATION KEPT YOU FROM MEETINGS, WORK, OR FROM GETTING THINGS NEEDED FOR DAILY LIVING?: NO

## 2020-01-11 SDOH — ECONOMIC STABILITY: FOOD INSECURITY: WITHIN THE PAST 12 MONTHS, YOU WORRIED THAT YOUR FOOD WOULD RUN OUT BEFORE YOU GOT MONEY TO BUY MORE.: NEVER TRUE

## 2020-01-11 SDOH — ECONOMIC STABILITY: TRANSPORTATION INSECURITY
IN THE PAST 12 MONTHS, HAS THE LACK OF TRANSPORTATION KEPT YOU FROM MEDICAL APPOINTMENTS OR FROM GETTING MEDICATIONS?: NO

## 2020-01-11 ASSESSMENT — COGNITIVE AND FUNCTIONAL STATUS - GENERAL
MOBILITY SCORE: 24
SUGGESTED CMS G CODE MODIFIER MOBILITY: CH
DAILY ACTIVITIY SCORE: 24
SUGGESTED CMS G CODE MODIFIER DAILY ACTIVITY: CH

## 2020-01-11 ASSESSMENT — ENCOUNTER SYMPTOMS
HEADACHES: 0
CHILLS: 0
SHORTNESS OF BREATH: 1
FALLS: 0
LOSS OF CONSCIOUSNESS: 0
TINGLING: 0
PALPITATIONS: 0
WEAKNESS: 0
STRIDOR: 0
DEPRESSION: 0
NAUSEA: 0
COUGH: 1
FEVER: 0
ABDOMINAL PAIN: 0
MYALGIAS: 0
WHEEZING: 1
VOMITING: 0
DIZZINESS: 1
DIARRHEA: 0
CONSTIPATION: 0
SPUTUM PRODUCTION: 0

## 2020-01-11 ASSESSMENT — LIFESTYLE VARIABLES
EVER FELT BAD OR GUILTY ABOUT YOUR DRINKING: NO
TOTAL SCORE: 0
HOW MANY TIMES IN THE PAST YEAR HAVE YOU HAD 5 OR MORE DRINKS IN A DAY: 0
HAVE PEOPLE ANNOYED YOU BY CRITICIZING YOUR DRINKING: NO
EVER_SMOKED: YES
DOES PATIENT WANT TO STOP DRINKING: NO
ALCOHOL_USE: NO
AVERAGE NUMBER OF DAYS PER WEEK YOU HAVE A DRINK CONTAINING ALCOHOL: 0
HAVE YOU EVER FELT YOU SHOULD CUT DOWN ON YOUR DRINKING: NO
CONSUMPTION TOTAL: NEGATIVE
EVER HAD A DRINK FIRST THING IN THE MORNING TO STEADY YOUR NERVES TO GET RID OF A HANGOVER: NO
EVER_SMOKED: YES
TOTAL SCORE: 0
ON A TYPICAL DAY WHEN YOU DRINK ALCOHOL HOW MANY DRINKS DO YOU HAVE: 0
TOTAL SCORE: 0

## 2020-01-11 ASSESSMENT — COPD QUESTIONNAIRES
DO YOU EVER COUGH UP ANY MUCUS OR PHLEGM?: NO/ONLY WITH OCCASIONAL COLDS OR INFECTIONS
DO YOU EVER COUGH UP ANY MUCUS OR PHLEGM?: NO/ONLY WITH OCCASIONAL COLDS OR INFECTIONS
DURING THE PAST 4 WEEKS HOW MUCH DID YOU FEEL SHORT OF BREATH: NONE/LITTLE OF THE TIME
HAVE YOU SMOKED AT LEAST 100 CIGARETTES IN YOUR ENTIRE LIFE: YES
COPD SCREENING SCORE: 5
IN THE PAST 12 MONTHS DO YOU DO LESS THAN YOU USED TO BECAUSE OF YOUR BREATHING PROBLEMS: AGREE
HAVE YOU SMOKED AT LEAST 100 CIGARETTES IN YOUR ENTIRE LIFE: YES
DURING THE PAST 4 WEEKS HOW MUCH DID YOU FEEL SHORT OF BREATH: NONE/LITTLE OF THE TIME
COPD SCREENING SCORE: 5

## 2020-01-11 ASSESSMENT — PATIENT HEALTH QUESTIONNAIRE - PHQ9
1. LITTLE INTEREST OR PLEASURE IN DOING THINGS: NOT AT ALL
2. FEELING DOWN, DEPRESSED, IRRITABLE, OR HOPELESS: NOT AT ALL
SUM OF ALL RESPONSES TO PHQ9 QUESTIONS 1 AND 2: 0

## 2020-01-12 PROBLEM — R79.89 ELEVATED LACTIC ACID LEVEL: Status: ACTIVE | Noted: 2020-01-12

## 2020-01-12 LAB
ANION GAP SERPL CALC-SCNC: 12 MMOL/L (ref 0–11.9)
BUN SERPL-MCNC: 33 MG/DL (ref 8–22)
CALCIUM SERPL-MCNC: 8.7 MG/DL (ref 8.5–10.5)
CHLORIDE SERPL-SCNC: 108 MMOL/L (ref 96–112)
CO2 SERPL-SCNC: 17 MMOL/L (ref 20–33)
CREAT SERPL-MCNC: 1.45 MG/DL (ref 0.5–1.4)
ERYTHROCYTE [DISTWIDTH] IN BLOOD BY AUTOMATED COUNT: 50.8 FL (ref 35.9–50)
GLUCOSE SERPL-MCNC: 133 MG/DL (ref 65–99)
HCT VFR BLD AUTO: 34.8 % (ref 42–52)
HGB BLD-MCNC: 11.6 G/DL (ref 14–18)
MCH RBC QN AUTO: 34.6 PG (ref 27–33)
MCHC RBC AUTO-ENTMCNC: 33.3 G/DL (ref 33.7–35.3)
MCV RBC AUTO: 103.9 FL (ref 81.4–97.8)
PLATELET # BLD AUTO: 169 K/UL (ref 164–446)
PMV BLD AUTO: 10.1 FL (ref 9–12.9)
POTASSIUM SERPL-SCNC: 4.6 MMOL/L (ref 3.6–5.5)
RBC # BLD AUTO: 3.35 M/UL (ref 4.7–6.1)
SODIUM SERPL-SCNC: 137 MMOL/L (ref 135–145)
WBC # BLD AUTO: 13.6 K/UL (ref 4.8–10.8)

## 2020-01-12 PROCEDURE — 94640 AIRWAY INHALATION TREATMENT: CPT

## 2020-01-12 PROCEDURE — 700111 HCHG RX REV CODE 636 W/ 250 OVERRIDE (IP): Performed by: NURSE PRACTITIONER

## 2020-01-12 PROCEDURE — A9270 NON-COVERED ITEM OR SERVICE: HCPCS | Performed by: INTERNAL MEDICINE

## 2020-01-12 PROCEDURE — 700102 HCHG RX REV CODE 250 W/ 637 OVERRIDE(OP): Performed by: HOSPITALIST

## 2020-01-12 PROCEDURE — 94669 MECHANICAL CHEST WALL OSCILL: CPT

## 2020-01-12 PROCEDURE — 700102 HCHG RX REV CODE 250 W/ 637 OVERRIDE(OP): Performed by: INTERNAL MEDICINE

## 2020-01-12 PROCEDURE — 94668 MNPJ CHEST WALL SBSQ: CPT

## 2020-01-12 PROCEDURE — 770006 HCHG ROOM/CARE - MED/SURG/GYN SEMI*

## 2020-01-12 PROCEDURE — 99233 SBSQ HOSP IP/OBS HIGH 50: CPT | Performed by: INTERNAL MEDICINE

## 2020-01-12 PROCEDURE — A9270 NON-COVERED ITEM OR SERVICE: HCPCS | Performed by: HOSPITALIST

## 2020-01-12 PROCEDURE — 80048 BASIC METABOLIC PNL TOTAL CA: CPT

## 2020-01-12 PROCEDURE — 700111 HCHG RX REV CODE 636 W/ 250 OVERRIDE (IP): Performed by: INTERNAL MEDICINE

## 2020-01-12 PROCEDURE — 700105 HCHG RX REV CODE 258: Performed by: INTERNAL MEDICINE

## 2020-01-12 PROCEDURE — 94760 N-INVAS EAR/PLS OXIMETRY 1: CPT

## 2020-01-12 PROCEDURE — 99406 BEHAV CHNG SMOKING 3-10 MIN: CPT

## 2020-01-12 PROCEDURE — 700101 HCHG RX REV CODE 250: Performed by: INTERNAL MEDICINE

## 2020-01-12 PROCEDURE — 85027 COMPLETE CBC AUTOMATED: CPT

## 2020-01-12 PROCEDURE — 94667 MNPJ CHEST WALL 1ST: CPT

## 2020-01-12 RX ORDER — IPRATROPIUM BROMIDE AND ALBUTEROL SULFATE 2.5; .5 MG/3ML; MG/3ML
3 SOLUTION RESPIRATORY (INHALATION)
Status: DISCONTINUED | OUTPATIENT
Start: 2020-01-13 | End: 2020-01-13 | Stop reason: HOSPADM

## 2020-01-12 RX ORDER — CALCIUM CARBONATE 500 MG/1
500 TABLET, CHEWABLE ORAL 4 TIMES DAILY PRN
Status: DISCONTINUED | OUTPATIENT
Start: 2020-01-12 | End: 2020-01-13 | Stop reason: HOSPADM

## 2020-01-12 RX ORDER — HEPARIN SODIUM 5000 [USP'U]/ML
5000 INJECTION, SOLUTION INTRAVENOUS; SUBCUTANEOUS EVERY 8 HOURS
Status: DISCONTINUED | OUTPATIENT
Start: 2020-01-12 | End: 2020-01-13 | Stop reason: HOSPADM

## 2020-01-12 RX ADMIN — HEPARIN SODIUM 5000 UNITS: 5000 INJECTION, SOLUTION INTRAVENOUS; SUBCUTANEOUS at 20:19

## 2020-01-12 RX ADMIN — IPRATROPIUM BROMIDE AND ALBUTEROL SULFATE 3 ML: .5; 3 SOLUTION RESPIRATORY (INHALATION) at 01:43

## 2020-01-12 RX ADMIN — IPRATROPIUM BROMIDE AND ALBUTEROL SULFATE 3 ML: .5; 3 SOLUTION RESPIRATORY (INHALATION) at 15:20

## 2020-01-12 RX ADMIN — METHYLPREDNISOLONE SODIUM SUCCINATE 40 MG: 40 INJECTION, POWDER, FOR SOLUTION INTRAMUSCULAR; INTRAVENOUS at 23:32

## 2020-01-12 RX ADMIN — LIDOCAINE HYDROCHLORIDE 15 ML: 20 SOLUTION OROPHARYNGEAL at 23:47

## 2020-01-12 RX ADMIN — METHYLPREDNISOLONE SODIUM SUCCINATE 40 MG: 40 INJECTION, POWDER, FOR SOLUTION INTRAMUSCULAR; INTRAVENOUS at 12:03

## 2020-01-12 RX ADMIN — AZITHROMYCIN 500 MG: 250 TABLET, FILM COATED ORAL at 20:20

## 2020-01-12 RX ADMIN — METHYLPREDNISOLONE SODIUM SUCCINATE 40 MG: 40 INJECTION, POWDER, FOR SOLUTION INTRAMUSCULAR; INTRAVENOUS at 17:46

## 2020-01-12 RX ADMIN — CLOPIDOGREL BISULFATE 75 MG: 75 TABLET ORAL at 05:04

## 2020-01-12 RX ADMIN — ACETAMINOPHEN 650 MG: 325 TABLET, FILM COATED ORAL at 22:14

## 2020-01-12 RX ADMIN — IPRATROPIUM BROMIDE AND ALBUTEROL SULFATE 3 ML: .5; 3 SOLUTION RESPIRATORY (INHALATION) at 07:27

## 2020-01-12 RX ADMIN — IPRATROPIUM BROMIDE AND ALBUTEROL SULFATE 3 ML: .5; 3 SOLUTION RESPIRATORY (INHALATION) at 18:33

## 2020-01-12 RX ADMIN — ASPIRIN 81 MG: 81 TABLET, COATED ORAL at 05:04

## 2020-01-12 RX ADMIN — IPRATROPIUM BROMIDE AND ALBUTEROL SULFATE 3 ML: .5; 3 SOLUTION RESPIRATORY (INHALATION) at 11:07

## 2020-01-12 RX ADMIN — ATORVASTATIN CALCIUM 40 MG: 40 TABLET, FILM COATED ORAL at 20:20

## 2020-01-12 RX ADMIN — SODIUM CHLORIDE, POTASSIUM CHLORIDE, SODIUM LACTATE AND CALCIUM CHLORIDE: 600; 310; 30; 20 INJECTION, SOLUTION INTRAVENOUS at 19:24

## 2020-01-12 RX ADMIN — METHYLPREDNISOLONE SODIUM SUCCINATE 40 MG: 40 INJECTION, POWDER, FOR SOLUTION INTRAMUSCULAR; INTRAVENOUS at 05:03

## 2020-01-12 ASSESSMENT — ENCOUNTER SYMPTOMS
CONSTIPATION: 0
DOUBLE VISION: 0
VOMITING: 0
WHEEZING: 0
FEVER: 0
MYALGIAS: 0
CHILLS: 0
DIARRHEA: 0
PALPITATIONS: 0
ABDOMINAL PAIN: 0
NAUSEA: 0
BLURRED VISION: 0
FOCAL WEAKNESS: 0
SENSORY CHANGE: 0
SPEECH CHANGE: 0
DIZZINESS: 0
HEADACHES: 0
SHORTNESS OF BREATH: 1
COUGH: 0

## 2020-01-12 ASSESSMENT — COPD QUESTIONNAIRES
COPD SCREENING SCORE: 5
HAVE YOU SMOKED AT LEAST 100 CIGARETTES IN YOUR ENTIRE LIFE: YES
DO YOU EVER COUGH UP ANY MUCUS OR PHLEGM?: NO/ONLY WITH OCCASIONAL COLDS OR INFECTIONS
DURING THE PAST 4 WEEKS HOW MUCH DID YOU FEEL SHORT OF BREATH: NONE/LITTLE OF THE TIME

## 2020-01-12 ASSESSMENT — LIFESTYLE VARIABLES: EVER_SMOKED: YES

## 2020-01-12 NOTE — CARE PLAN
Problem: Oxygenation:  Goal: Maintain adequate oxygenation dependent on patient condition  Outcome: PROGRESSING AS EXPECTED   2L home O2  Problem: Bronchoconstriction:  Goal: Improve in air movement and diminished wheezing  Outcome: PROGRESSING AS EXPECTED   Duo Q4 with Flutter QID

## 2020-01-12 NOTE — PROGRESS NOTES
Pt arrived in the unit at 2305 via gurney. Assisted and ambulated in the bed. On oxygen via nasal cannula at 2 liters per minute.  Head to toe assessment done. Admission profile done. Diet reinstructed. Offered fluids. Due medications given. Safety precautions in placed. Bed in lowest position. Upper side rails up. Treaded socks on. Reinforced the use of call light when needing assistance.

## 2020-01-12 NOTE — RESPIRATORY CARE
"  COPD EDUCATION by COPD CLINICAL EDUCATOR  (Phone: [phone])  1/12/2020 at 9:49 AM by Sasha Rodriguez     Patient was interviewed by Respiratory Education team for COPD program. Patient refused COPD program. After some discussion he took our information packet about lung disease, its treatments and \"Stop Smoking\". He has no desire to quit smoking.    He declined to discuss further his recent [facility] visit for which he was provided Trelegy Ellipta DPI script at the dispensing clinic (free)  to manage his COPD. He states they were out of this medication at the time. He failed to follow up. He states, \"I have to wait for everything and that's not going to happen\". He states he can not afford his out of pocket expenses but declined to pursue his free options. He continued to engage in discussion about what was wrong with health care. When asked how we could help him, he declined to discuss. This interaction was discussed with the Hospitalist caring for him this morning.    "

## 2020-01-12 NOTE — ED NOTES
Medication reconciliation updated and complete per pt at bedside with medication list. Reviewed list with pt and returned to pt at bedside  Allergies have been verified and updated   No oral ABX within the last 14 days  Pt home pharmacy:[facility]

## 2020-01-12 NOTE — PROGRESS NOTES
[facility] Medicine Daily Progress Note    Date of Service  1/12/2020    Chief Complaint  74 y.o. male admitted 1/11/2020 with shortness of breath.    Hospital Course    This is a 74-year-old male with a past medical history of COPD, hypertension, hyperlipidemia admitted with wheezing and shortness of breath.  He was found to be in acute COPD exacerbation.  Chest x-ray was negative for pneumonia.  He has been initiated on aggressive respiratory protocol, steroids, and azithromycin.      Interval Problem Update  1/12: Patient reports significant improvement in shortness of breath.  Continues to require supplemental oxygen.  Will wean as tolerated.  He endorses that he continues to smoke cigarettes and reports he will put forth effort to quit.  Afebrile.  Vital signs stable.    Consultants/Specialty  N/A    Code Status  FULL    Disposition  Anticipate home at discharge when symptoms improved.    Review of Systems  Review of Systems   Constitutional: Positive for malaise/fatigue. Negative for chills and fever.   HENT: Negative for congestion.    Eyes: Negative for blurred vision and double vision.   Respiratory: Positive for shortness of breath. Negative for cough and wheezing.    Cardiovascular: Negative for chest pain, palpitations and leg swelling.   Gastrointestinal: Negative for abdominal pain, constipation, diarrhea, nausea and vomiting.   Genitourinary: Negative for dysuria.   Musculoskeletal: Negative for joint pain and myalgias.   Skin: Negative for itching and rash.   Neurological: Negative for dizziness, sensory change, speech change, focal weakness and headaches.        Physical Exam  Temp:  [36.2 °C (97.2 °F)-36.6 °C (97.8 °F)] 36.2 °C (97.2 °F)  Pulse:  [] 94  Resp:  [16-22] 17  BP: ()/(43-70) 97/45  SpO2:  [90 %-100 %] 91 %    Physical Exam  Constitutional:       General: He is not in acute distress.     Appearance: Normal appearance. He is not ill-appearing.   HENT:      Head: Normocephalic  and atraumatic.      Nose: Nose normal. No congestion.      Mouth/Throat:      Mouth: Mucous membranes are moist.      Pharynx: Oropharynx is clear.   Eyes:      General:         Right eye: No discharge.         Left eye: No discharge.      Conjunctiva/sclera: Conjunctivae normal.   Neck:      Musculoskeletal: Normal range of motion. No neck rigidity.   Cardiovascular:      Rate and Rhythm: Normal rate and regular rhythm.      Pulses: Normal pulses.      Heart sounds: Normal heart sounds. No murmur.   Pulmonary:      Effort: Pulmonary effort is normal. No respiratory distress.      Breath sounds: Examination of the right-lower field reveals decreased breath sounds. Examination of the left-lower field reveals decreased breath sounds. Decreased breath sounds present.   Abdominal:      General: Bowel sounds are normal. There is no distension.      Palpations: Abdomen is soft.   Musculoskeletal: Normal range of motion.         General: No swelling or tenderness.   Skin:     General: Skin is warm and dry.      Coloration: Skin is not jaundiced or pale.   Neurological:      General: No focal deficit present.      Mental Status: He is alert and oriented to person, place, and time. Mental status is at baseline.      Cranial Nerves: No cranial nerve deficit.      Motor: No weakness.   Psychiatric:         Mood and Affect: Mood normal.         Behavior: Behavior normal.         Thought Content: Thought content normal.         Judgment: Judgment normal.         Fluids    Intake/Output Summary (Last 24 hours) at 1/12/2020 0820  Last data filed at 1/12/2020 0600  Gross per 24 hour   Intake 1650 ml   Output 200 ml   Net 1450 ml       Laboratory  Recent Labs     01/11/20  1700   WBC 7.2   RBC 4.24*   HEMOGLOBIN 14.3   HEMATOCRIT 44.4   .7*   MCH 33.7*   MCHC 32.2*   RDW 50.2*   PLATELETCT 196   MPV 9.9     Recent Labs     01/11/20  1700   SODIUM 139   POTASSIUM 4.8   CHLORIDE 108   CO2 22   GLUCOSE 91   BUN 24*   CREATININE  1.43*   CALCIUM 8.7                   Imaging  DX-CHEST-PORTABLE (1 VIEW)   Final Result      1.  Mild diffuse interstitial prominence is present without cardiac enlargement. This could represent underlying chronic lung disease or possible vascular congestion.           Assessment/Plan  * Acute exacerbation of chronic obstructive pulmonary disease (COPD) ([facility])- (present on admission)  Assessment & Plan  -chest x-ray noted no acute cardiopulmonary process  -procalcitonin negative.  -Continue respiratory care per protocol, IV steroids, and azithromycin.  -Wean supplemental oxygen as tolerated.    Acute respiratory failure with hypoxia ([facility])- (present on admission)  Assessment & Plan  -Due to COPD exacerbation  -Continue plan as above.  -Wean supplemental oxygen as tolerated.    Chronic renal failure, stage 3 (moderate) ([facility])- (present on admission)  Assessment & Plan  -Near baseline  -Follow BMP.  -Avoid nephrotoxins.    Hyperlipidemia- (present on admission)  Assessment & Plan  -Continue home statin    Elevated lactic acid level- (present on admission)  Assessment & Plan  -Resolved with IV fluids.    Essential hypertension- (present on admission)  Assessment & Plan  -Continue home metoprolol, lisinopril  -PRN enalapril  -Adjust as needed    Tobacco abuse- (present on admission)  Assessment & Plan  -Tobacco cessation counseling and education provided for more than 10 minutes. Nicotine replacement options provided including patch, and further medical treatments including Wellbutrin and chantix.  As well as over the counter options of lozenges and gum         VTE prophylaxis: Heparin.

## 2020-01-12 NOTE — PROGRESS NOTES
Patient AAOx4, up self, 2L O2 via NC, patient pleasant, complaining of stomach pain yet declines taking pain medication.  Takes all other schedule medications without issue

## 2020-01-12 NOTE — H&P
Hospital Medicine History & Physical Note    Date of Service  1/11/2020    Primary Care Physician  Robert Lindo M.D.    Consultants  None    Code Status  Full    Chief Complaint  Shortness of breath    History of Presenting Illness  74 y.o. male who presented 1/11/2020 with shortness of breath.  Patient states this afternoon he had relatively sudden onset of wheezing and then he became short of breath.  It continued to worsen he presented to the emergency department.  Patient states he became lightheaded with ambulation.  He states he also had a cough that started a couple of days ago but it is been nonproductive.  He denied any fever or chills.  He does have a history of COPD but does not use oxygen at home.  Upon arrival, he did have an elevated lactic acid level.  During my exam he was satting 88% on room air.  I did discuss the case including labs and imaging with the ER physician.    Review of Systems  Review of Systems   Constitutional: Negative for chills, fever and malaise/fatigue.   HENT: Negative for congestion.    Respiratory: Positive for cough, shortness of breath and wheezing. Negative for sputum production and stridor.    Cardiovascular: Negative for chest pain, palpitations and leg swelling.   Gastrointestinal: Negative for abdominal pain, constipation, diarrhea, nausea and vomiting.   Genitourinary: Negative for dysuria and urgency.   Musculoskeletal: Negative for falls and myalgias.   Neurological: Positive for dizziness. Negative for tingling, loss of consciousness, weakness and headaches.   Psychiatric/Behavioral: Negative for depression and suicidal ideas.   All other systems reviewed and are negative.      Past Medical History   has a past medical history of Arthritis, COPD, EMPHYSEMA, Essential hypertension (12/2/2019), Heart attack (HCC) (12/02/2017), Hyperlipidemia (4/17/2012), and Pain in joint, multiple sites. He also has no past medical history of Diabetes.    Surgical History   has  a past surgical history that includes open reduction and stent placement (12/02/2017).     Family History  family history includes Alzheimer's Disease in his mother; Arthritis in his mother; Kidney Disease in his father; Lung Disease in his mother; Osteoporosis in his mother; Other in his mother.     Social History   reports that he has been smoking cigarettes. He started smoking about 62 years ago. He has a 60.00 pack-year smoking history. He quit smokeless tobacco use about 63 years ago.  His smokeless tobacco use included chew. He reports that he does not drink alcohol or use drugs.    Allergies  Allergies   Allergen Reactions   • Chantix [Varenicline] Nausea     Made patient feel sick.   • Fish Vomiting       Medications  Prior to Admission Medications   Prescriptions Last Dose Informant Patient Reported? Taking?   HYDROcodone-acetaminophen (NORCO) 5-325 MG Tab per tablet last week at prn Patient No No   Sig: Take 1 Tab by mouth every 8 hours as needed for up to 30 days. Prescription must last 30 days.   albuterol 108 (90 BASE) MCG/ACT Aero Soln inhalation aerosol 1/11/2020 at am Patient No No   Sig: Inhale 2 Puffs by mouth every 6 hours as needed for Shortness of Breath.   aspirin EC (ECOTRIN) 81 MG Tablet Delayed Response 1/11/2020 at 0700 Patient Yes No   Sig: Take 81 mg by mouth every day.   atorvastatin (LIPITOR) 40 MG Tab 1/10/2020 at pm Patient Yes No   Sig: Take 40 mg by mouth every evening.   clopidogrel (PLAVIX) 75 MG Tab 1/11/2020 at 0700 Patient Yes No   Sig: Take 75 mg by mouth every day.   cyanocobalamin (VITAMIN B12) 1000 MCG Tab 1/11/2020 at 0700 Patient No No   Sig: Take 1 Tab by mouth every morning with breakfast for 90 days.   lisinopril (PRINIVIL) 5 MG Tab 1/11/2020 at 0700 Patient Yes No   Sig: Take 5 mg by mouth every day.   metoprolol SR (TOPROL XL) 50 MG TABLET SR 24 HR 1/11/2020 at 0700 Patient Yes No   Sig: Take 50 mg by mouth every day.   nitroglycerin (NITROSTAT) 0.4 MG SL Tab prn at  prn Patient Yes Yes   Sig: Place 0.4 mg under tongue every 5 minutes as needed for Chest Pain.      Facility-Administered Medications: None       Physical Exam  Temp:  [36.6 °C (97.8 °F)] 36.6 °C (97.8 °F)  Pulse:  [65-85] 65  Resp:  [22] 22  BP: (104-132)/(46-70) 110/57  SpO2:  [95 %-100 %] 95 %    Physical Exam  Vitals signs and nursing note reviewed.   Constitutional:       General: He is not in acute distress.     Appearance: He is well-developed. He is not toxic-appearing or diaphoretic.   HENT:      Head: Normocephalic and atraumatic.      Right Ear: External ear normal.      Left Ear: External ear normal.      Nose: Nose normal. No congestion or rhinorrhea.      Mouth/Throat:      Mouth: Mucous membranes are dry.      Pharynx: No oropharyngeal exudate.   Eyes:      General: No scleral icterus.        Right eye: No discharge.         Left eye: No discharge.      Extraocular Movements: Extraocular movements intact.   Neck:      Musculoskeletal: Normal range of motion and neck supple. No edema or erythema.      Trachea: No tracheal deviation.   Cardiovascular:      Rate and Rhythm: Normal rate and regular rhythm.      Heart sounds: No murmur. No friction rub. No gallop.    Pulmonary:      Effort: Pulmonary effort is normal. No respiratory distress.      Breath sounds: No stridor. Decreased breath sounds and wheezing present. No rales.   Chest:      Chest wall: No tenderness.   Abdominal:      General: Bowel sounds are normal. There is no distension.      Palpations: Abdomen is soft.      Tenderness: There is no tenderness. There is no guarding or rebound.   Musculoskeletal: Normal range of motion.         General: No tenderness.      Right lower leg: No edema.      Left lower leg: No edema.   Lymphadenopathy:      Cervical: No cervical adenopathy.   Skin:     General: Skin is warm and dry.      Coloration: Skin is not jaundiced.      Findings: No erythema or rash.   Neurological:      General: No focal deficit  present.      Mental Status: He is alert and oriented to person, place, and time.      Cranial Nerves: No cranial nerve deficit.   Psychiatric:         Mood and Affect: Mood normal.         Behavior: Behavior normal.         Thought Content: Thought content normal.         Judgment: Judgment normal.         Laboratory:  Recent Labs     01/11/20  1700   WBC 7.2   RBC 4.24*   HEMOGLOBIN 14.3   HEMATOCRIT 44.4   .7*   MCH 33.7*   MCHC 32.2*   RDW 50.2*   PLATELETCT 196   MPV 9.9     Recent Labs     01/11/20  1700   SODIUM 139   POTASSIUM 4.8   CHLORIDE 108   CO2 22   GLUCOSE 91   BUN 24*   CREATININE 1.43*   CALCIUM 8.7     Recent Labs     01/11/20  1700   ALTSGPT 12   ASTSGOT 19   ALKPHOSPHAT 59   TBILIRUBIN 0.3   GLUCOSE 91         Recent Labs     01/11/20  1700   NTPROBNP 313*         Recent Labs     01/11/20  1700   TROPONINT 11       Urinalysis:    No results found     Imaging:  DX-CHEST-PORTABLE (1 VIEW)   Final Result      1.  Mild diffuse interstitial prominence is present without cardiac enlargement. This could represent underlying chronic lung disease or possible vascular congestion.            Assessment/Plan:  I anticipate this patient will require at least two midnights for appropriate medical management, necessitating inpatient admission.    * Acute exacerbation of chronic obstructive pulmonary disease (COPD) (HCC)- (present on admission)  Assessment & Plan  -I did personally review his chest x-ray, noted no acute cardiopulmonary process  -Do not see a sign of pneumonia, obtain procalcitonin  -Start respiratory care per protocol  -Start IV steroids  -will also start oral azithromycin for its anti-inflammatory properties    Acute respiratory failure with hypoxia (HCC)- (present on admission)  Assessment & Plan  -Due to COPD exacerbation  -Patient was satting 88% on room air    Chronic renal failure, stage 3 (moderate) (HCC)- (present on admission)  Assessment & Plan  -Near baseline, repeat BMP in  the morning    Hyperlipidemia- (present on admission)  Assessment & Plan  -Continue home statin    Elevated lactic acid level- (present on admission)  Assessment & Plan  -Possibly due to hypoxia  -Continue to trend lactic acid level  -Patient is also dehydrated, start IV fluids    Essential hypertension- (present on admission)  Assessment & Plan  -Continue home metoprolol, lisinopril  -Start PRN enalapril  -Adjust as needed    Tobacco abuse- (present on admission)  Assessment & Plan  -Tobacco cessation counseling and education provided for more than 10 minutes. Nicotine replacement options provided including patch, and further medical treatments including Wellbutrin and chantix.  As well as over the counter options of lozenges and gum      VTE prophylaxis: Lovenox

## 2020-01-12 NOTE — ASSESSMENT & PLAN NOTE
-chest x-ray noted no acute cardiopulmonary process  -procalcitonin negative.  -Continue respiratory care per protocol, IV steroids, and azithromycin.  -Wean supplemental oxygen as tolerated.

## 2020-01-12 NOTE — ASSESSMENT & PLAN NOTE
-Tobacco cessation counseling and education provided for more than 10 minutes. Nicotine replacement options provided including patch, and further medical treatments including Wellbutrin and chantix.  As well as over the counter options of lozenges and gum

## 2020-01-12 NOTE — CARE PLAN
Problem: Safety  Goal: Will remain free from falls  Outcome: PROGRESSING AS EXPECTED     Problem: Infection  Goal: Will remain free from infection  Outcome: PROGRESSING AS EXPECTED

## 2020-01-12 NOTE — PROGRESS NOTES
2 RN skin check complete with Irlanda ORLANDO.   Devices in place Nasal Cannula.  Skin assessed under devices yes; intact.  Confirmed pressure ulcers found on N/A.  New potential pressure ulcers noted on N/A. Wound consult placed N/A.    Noted redness blanching sacral area. Other areas of skin is intact. No open wounds noted.    The following interventions in place; on pressure redistribution mattress; encouraged to turn to sides.

## 2020-01-12 NOTE — CARE PLAN
Problem: Safety  Goal: Will remain free from falls  1/12/2020 0107 by Jose Manuel Rodríguez R.N.  Outcome: PROGRESSING AS EXPECTED     Problem: Respiratory:  Goal: Respiratory status will improve  Outcome: PROGRESSING AS EXPECTED     Problem: Infection  Goal: Will remain free from infection  Outcome: PROGRESSING AS EXPECTED

## 2020-01-12 NOTE — ED PROVIDER NOTES
ED Provider Note    CHIEF COMPLAINT  Chief Complaint   Patient presents with   • Shortness of Breath       HPI  Fredy Gonsalez Jr. is a 74 y.o. male who presents to the emergency department complaining of shortness of breath.  Patient says symptoms began around 3 PM while at rest he got so short of breath he could not walk across his house.  He has had a dry cough recently.  The patient has a history of COPD but he does not currently have an inhaler at home.  EMS was called and the patient was given nebulizer treatments on the way to the hospital and he says he is feeling much better.  He does not recall any specific precipitating events or exacerbating or alleviating factors    REVIEW OF SYSTEMS no chest pain no hemoptysis.  All other systems negative    PAST MEDICAL HISTORY  Past Medical History:   Diagnosis Date   • Arthritis    • COPD    • EMPHYSEMA    • Essential hypertension 12/2/2019   • Heart attack (HCC) 12/02/2017    received 3 stents, [facility]   • Hyperlipidemia 4/17/2012   • Pain in joint, multiple sites        FAMILY HISTORY  Family History   Problem Relation Age of Onset   • Arthritis Mother    • Lung Disease Mother    • Osteoporosis Mother    • Alzheimer's Disease Mother    • Other Mother         parkinsons   • Kidney Disease Father    • Cancer Neg Hx    • Diabetes Neg Hx    • Heart Disease Neg Hx    • Stroke Neg Hx        SOCIAL HISTORY  Social History     Socioeconomic History   • Marital status: Single     Spouse name: Not on file   • Number of children: Not on file   • Years of education: Not on file   • Highest education level: Not on file   Occupational History   • Not on file   Social Needs   • Financial resource strain: Not on file   • Food insecurity:     Worry: Not on file     Inability: Not on file   • Transportation needs:     Medical: Not on file     Non-medical: Not on file   Tobacco Use   • Smoking status: Current Every Day Smoker     Packs/day: 1.00     Years: 60.00      Pack years: 60.00     Types: Cigarettes     Start date: 6/15/1957   • Smokeless tobacco: Former User     Types: Chew     Quit date: 1957   Substance and Sexual Activity   • Alcohol use: No     Alcohol/week: 0.0 oz     Comment: quit 28yrs ago   • Drug use: No     Types: Marijuana     Comment: quit in 1969, THC Hash    • Sexual activity: Never     Partners: Female     Birth control/protection: Condom   Lifestyle   • Physical activity:     Days per week: Not on file     Minutes per session: Not on file   • Stress: Not on file   Relationships   • Social connections:     Talks on phone: Not on file     Gets together: Not on file     Attends Restorationism service: Not on file     Active member of club or organization: Not on file     Attends meetings of clubs or organizations: Not on file     Relationship status: Not on file   • Intimate partner violence:     Fear of current or ex partner: Not on file     Emotionally abused: Not on file     Physically abused: Not on file     Forced sexual activity: Not on file   Other Topics Concern   • Not on file   Social History Narrative   • Not on file       SURGICAL HISTORY  Past Surgical History:   Procedure Laterality Date   • STENT PLACEMENT  12/02/2017    left main to proximal LAD, mid LAD, ostial left main   • OPEN REDUCTION      left arm. 32 yrs ago       CURRENT MEDICATIONS  Home Medications     Reviewed by Boy Alvarado (Pharmacy Tech) on 01/11/20 at 1942  Med List Status: Complete   Medication Last Dose Status   albuterol 108 (90 BASE) MCG/ACT Aero Soln inhalation aerosol 1/11/2020 Active   aspirin EC (ECOTRIN) 81 MG Tablet Delayed Response 1/11/2020 Active   atorvastatin (LIPITOR) 40 MG Tab 1/10/2020 Active   clopidogrel (PLAVIX) 75 MG Tab 1/11/2020 Active   cyanocobalamin (VITAMIN B12) 1000 MCG Tab 1/11/2020 Active   HYDROcodone-acetaminophen (NORCO) 5-325 MG Tab per tablet last week Active   lisinopril (PRINIVIL) 5 MG Tab 1/11/2020 Active   metoprolol SR (TOPROL  "XL) 50 MG TABLET SR 24 HR 1/11/2020 Active   nitroglycerin (NITROSTAT) 0.4 MG SL Tab prn Active                ALLERGIES  Allergies   Allergen Reactions   • Chantix [Varenicline] Nausea     Made patient feel sick.   • Fish Vomiting       PHYSICAL EXAM  VITAL SIGNS: /55   Pulse 66   Temp 36.6 °C (97.8 °F) (Tympanic)   Resp (!) 22   Ht 1.702 m (5' 7\")   Wt 79.4 kg (175 lb)   SpO2 97%   BMI 27.41 kg/m²    Oxygen saturation is interpreted as adequate  Constitutional: Awake verbal he does not appear toxic or distressed at this time  HENT: Mucous membranes are dry  Eyes: No erythema discharge or jaundice  Neck: Trachea midline no JVD  Cardiovascular: Regular rate and rhythm  Lungs: Distant bilaterally but I do not hear crackles or wheezes  Abdomen/Back: Soft nontender nondistended no peritoneal findings  Skin: Warm and dry  Musculoskeletal: No leg edema or calf tenderness  Neurologic: Awake verbal and moving all extremities    Laboratory  CBC shows white blood cell count of 7.2 hemoglobin is adequate at 14.3 basic metabolic panel is unremarkable LFTs are unremarkable lactic acid level is elevated at 3.1 troponin is 11 BNP is 313    EKG interpretation  Twelve-lead EKG shows sinus rhythm 85 bpm there is a right bundle branch block pattern Q waves noted in leads II, III and aVF no pathologic ST elevation and findings are similar to the cardiogram from December 1, 2019    Radiology  DX-CHEST-PORTABLE (1 VIEW)   Final Result      1.  Mild diffuse interstitial prominence is present without cardiac enlargement. This could represent underlying chronic lung disease or possible vascular congestion.        MEDICAL DECISION MAKING and DISPOSITION  In the emergency department an IV was established the patient was placed on a cardiac monitor he was given intravenous Decadron.  Because of the elevated lactic acid level the patient was started on intravenous normal saline.  The patient was kept n.p.o. during the initial part " of his evaluation therefore not given an oral fluid challenge.  I reviewed all the findings with the patient.  I reviewed the case with the hospitalist the patient is now referred to the hospitalist service for further evaluation and treatment    IMPRESSION  1.  Acute COPD exacerbation  2.  Dehydration  3.  Elevated lactic acid level         Electronically signed by: Keanu Lees, 1/11/2020 8:19 PM

## 2020-01-13 VITALS
TEMPERATURE: 98.2 F | HEART RATE: 93 BPM | DIASTOLIC BLOOD PRESSURE: 48 MMHG | BODY MASS INDEX: 26.26 KG/M2 | WEIGHT: 167.33 LBS | HEIGHT: 67 IN | OXYGEN SATURATION: 95 % | SYSTOLIC BLOOD PRESSURE: 98 MMHG | RESPIRATION RATE: 18 BRPM

## 2020-01-13 PROBLEM — R79.89 ELEVATED LACTIC ACID LEVEL: Status: RESOLVED | Noted: 2020-01-12 | Resolved: 2020-01-13

## 2020-01-13 PROBLEM — J96.01 ACUTE RESPIRATORY FAILURE WITH HYPOXIA (HCC): Status: RESOLVED | Noted: 2019-12-02 | Resolved: 2020-01-13

## 2020-01-13 LAB
FLUAV RNA SPEC QL NAA+PROBE: NEGATIVE
FLUBV RNA SPEC QL NAA+PROBE: NEGATIVE

## 2020-01-13 PROCEDURE — 700105 HCHG RX REV CODE 258: Performed by: INTERNAL MEDICINE

## 2020-01-13 PROCEDURE — 94668 MNPJ CHEST WALL SBSQ: CPT

## 2020-01-13 PROCEDURE — 700111 HCHG RX REV CODE 636 W/ 250 OVERRIDE (IP): Performed by: NURSE PRACTITIONER

## 2020-01-13 PROCEDURE — A9270 NON-COVERED ITEM OR SERVICE: HCPCS | Performed by: NURSE PRACTITIONER

## 2020-01-13 PROCEDURE — 700102 HCHG RX REV CODE 250 W/ 637 OVERRIDE(OP): Performed by: INTERNAL MEDICINE

## 2020-01-13 PROCEDURE — 700102 HCHG RX REV CODE 250 W/ 637 OVERRIDE(OP): Performed by: NURSE PRACTITIONER

## 2020-01-13 PROCEDURE — A9270 NON-COVERED ITEM OR SERVICE: HCPCS | Performed by: INTERNAL MEDICINE

## 2020-01-13 PROCEDURE — 700101 HCHG RX REV CODE 250: Performed by: INTERNAL MEDICINE

## 2020-01-13 PROCEDURE — 700111 HCHG RX REV CODE 636 W/ 250 OVERRIDE (IP): Performed by: INTERNAL MEDICINE

## 2020-01-13 PROCEDURE — 99239 HOSP IP/OBS DSCHRG MGMT >30: CPT | Performed by: INTERNAL MEDICINE

## 2020-01-13 PROCEDURE — 94640 AIRWAY INHALATION TREATMENT: CPT

## 2020-01-13 PROCEDURE — 94760 N-INVAS EAR/PLS OXIMETRY 1: CPT

## 2020-01-13 PROCEDURE — 87502 INFLUENZA DNA AMP PROBE: CPT

## 2020-01-13 RX ORDER — PREDNISONE 20 MG/1
40 TABLET ORAL DAILY
Qty: 8 TAB | Refills: 0 | Status: SHIPPED | OUTPATIENT
Start: 2020-01-13 | End: 2020-01-17

## 2020-01-13 RX ORDER — AZITHROMYCIN 250 MG/1
500 TABLET, FILM COATED ORAL DAILY
Status: COMPLETED | OUTPATIENT
Start: 2020-01-13 | End: 2020-01-13

## 2020-01-13 RX ORDER — BUDESONIDE AND FORMOTEROL FUMARATE DIHYDRATE 80; 4.5 UG/1; UG/1
2 AEROSOL RESPIRATORY (INHALATION) 2 TIMES DAILY
Qty: 1 INHALER | Refills: 0 | Status: SHIPPED
Start: 2020-01-13 | End: 2020-01-20 | Stop reason: SDUPTHER

## 2020-01-13 RX ADMIN — ASPIRIN 81 MG: 81 TABLET, COATED ORAL at 05:24

## 2020-01-13 RX ADMIN — METHYLPREDNISOLONE SODIUM SUCCINATE 40 MG: 40 INJECTION, POWDER, FOR SOLUTION INTRAMUSCULAR; INTRAVENOUS at 13:08

## 2020-01-13 RX ADMIN — IPRATROPIUM BROMIDE AND ALBUTEROL SULFATE 3 ML: .5; 3 SOLUTION RESPIRATORY (INHALATION) at 07:13

## 2020-01-13 RX ADMIN — SENNOSIDES AND DOCUSATE SODIUM 2 TABLET: 8.6; 5 TABLET ORAL at 05:24

## 2020-01-13 RX ADMIN — METHYLPREDNISOLONE SODIUM SUCCINATE 40 MG: 40 INJECTION, POWDER, FOR SOLUTION INTRAMUSCULAR; INTRAVENOUS at 05:24

## 2020-01-13 RX ADMIN — HEPARIN SODIUM 5000 UNITS: 5000 INJECTION, SOLUTION INTRAVENOUS; SUBCUTANEOUS at 05:23

## 2020-01-13 RX ADMIN — SODIUM CHLORIDE, POTASSIUM CHLORIDE, SODIUM LACTATE AND CALCIUM CHLORIDE: 600; 310; 30; 20 INJECTION, SOLUTION INTRAVENOUS at 06:39

## 2020-01-13 RX ADMIN — IPRATROPIUM BROMIDE AND ALBUTEROL SULFATE 3 ML: .5; 3 SOLUTION RESPIRATORY (INHALATION) at 10:38

## 2020-01-13 RX ADMIN — AZITHROMYCIN 500 MG: 250 TABLET, FILM COATED ORAL at 13:08

## 2020-01-13 RX ADMIN — CLOPIDOGREL BISULFATE 75 MG: 75 TABLET ORAL at 05:24

## 2020-01-13 NOTE — CARE PLAN
Problem: Safety  Goal: Will remain free from injury  Outcome: PROGRESSING AS EXPECTED     Problem: Infection  Goal: Will remain free from infection  Outcome: PROGRESSING AS EXPECTED     Problem: Bowel/Gastric:  Goal: Normal bowel function is maintained or improved  Outcome: PROGRESSING AS EXPECTED     Problem: Respiratory:  Goal: Respiratory status will improve  Outcome: PROGRESSING AS EXPECTED

## 2020-01-13 NOTE — DISCHARGE INSTRUCTIONS
Discharge Instructions    Discharged to home by taxi with escort. Discharged via walking, hospital escort: Yes.  Special equipment needed: Not Applicable    Be sure to schedule a follow-up appointment with your primary care doctor or any specialists as instructed.     Discharge Plan:   Diet Plan: Discussed  Smoking Cessation Offered: Patient Refused  Confirmed Follow up Appointment: Appointment Scheduled  Confirmed Symptoms Management: Discussed  Medication Reconciliation Updated: Yes  Influenza Vaccine Indication: Not indicated: Previously immunized this influenza season and > 8 years of age    I understand that a diet low in cholesterol, fat, and sodium is recommended for good health. Unless I have been given specific instructions below for another diet, I accept this instruction as my diet prescription.   Other diet: Cardiac    Special Instructions: None    · Is patient discharged on Warfarin / Coumadin?   No     Depression / Suicide Risk    As you are discharged from this [facility] facility, it is important to learn how to keep safe from harming yourself.    Recognize the warning signs:  · Abrupt changes in personality, positive or negative- including increase in energy   · Giving away possessions  · Change in eating patterns- significant weight changes-  positive or negative  · Change in sleeping patterns- unable to sleep or sleeping all the time   · Unwillingness or inability to communicate  · Depression  · Unusual sadness, discouragement and loneliness  · Talk of wanting to die  · Neglect of personal appearance   · Rebelliousness- reckless behavior  · Withdrawal from people/activities they love  · Confusion- inability to concentrate     If you or a loved one observes any of these behaviors or has concerns about self-harm, here's what you can do:  · Talk about it- your feelings and reasons for harming yourself  · Remove any means that you might use to hurt yourself (examples: pills, rope, extension cords,  firearm)  · Get professional help from the community (Mental Health, Substance Abuse, psychological counseling)  · Do not be alone:Call your Safe Contact- someone whom you trust who will be there for you.  · Call your local CRISIS HOTLINE 429-0271 or 142-858-2347  · Call your local Children's Mobile Crisis Response Team [place] (857) 616-6519 or www.[employer]  · Call the toll free National Suicide Prevention Hotlines   · National Suicide Prevention Lifeline 882-429-VGUI (3221)  · National Hope Line Network 800-SUICIDE (543-2111)

## 2020-01-13 NOTE — DISCHARGE PLANNING
Patient is eligible for Medicaid Meds to Beds at discharge if they have coverage with [place] Medicaid, Medicaid FFS, Medicaid HMO ([facility]), or [place]. This service is provided through the [facility] if orders are received by the pharmacy prior to 4pm Monday through Friday excluding holidays. Preferred pharmacy has been changed to [facility]. Please call x [phone] prior to discharge.

## 2020-01-13 NOTE — PROGRESS NOTES
Overnight, pt reported worsening abdominal pain. Tylenol given. Hospitalist notified and orders received to give GI cocktail. Pt reported having some relieve with the medications. LR infusion continued. Pt remains A&Ox4 on 2L NC. Safety precautions maintained. Will continue to monitor and report to the oncoming nurse.    room air

## 2020-01-13 NOTE — DISCHARGE PLANNING
Medication reconcilliation completed. Medications delivered to patient at bedside. Patient counseled.       Ángel Gonsalez Jr.   Home Medication Instructions MAGNOLIA:[id number]    Printed on:01/13/20 1202   Medication Information                      budesonide-formoterol (SYMBICORT) 80-4.5 MCG/ACT Aerosol  Inhale 2 Puffs by mouth 2 Times a Day.             predniSONE (DELTASONE) 20 MG Tab  Take 2 Tabs by mouth every day for 4 days.

## 2020-01-13 NOTE — DISCHARGE SUMMARY
Discharge Summary    CHIEF COMPLAINT ON ADMISSION  Chief Complaint   Patient presents with   • Shortness of Breath       Reason for Admission  EMS 20     Admission Date  1/11/2020    CODE STATUS  Full Code    HPI & HOSPITAL COURSE  This is a 74-year-old male with a past medical history of COPD, hypertension, hyperlipidemia admitted with wheezing and shortness of breath.  In the ED his oxygen saturation was found to be 88% on room air.  He was found to be in acute COPD exacerbation.  Chest x-ray was negative for pneumonia.  He has been initiated on aggressive respiratory protocol, steroids, and azithromycin.  He had progressive improvement of his shortness of breath over his hospital stay.  He currently is stable on room air.    He was evaluated by our COPD educator and declined education.  He does report not being able to fill his prescriptions for his inhalers as an outpatient.  According to the COPD educator the patient does have free options available, but he did not pursue these options.  The patient has been encouraged to continue compliance with outpatient follow-up and with his medications.  The patient also continues to smoke and is encouraged to maintain cessation.    At this time the patient is medically clear.  He will be discharged with albuterol and Symbicort inhalers.  He is encouraged to follow-up with his outpatient provider to ensure the patient is getting the cheapest form of inhalers available for him.  The patient will also finish an oral regimen of prednisone.  He has completed an oral regimen of azithromycin.    Therefore, he is discharged in good and stable condition to home with close outpatient follow-up.    The patient met 2-midnight criteria for an inpatient stay at the time of discharge.    Discharge Date  1/13/2020    FOLLOW UP ITEMS POST DISCHARGE  -Continue inhalers as prescribed.  -Complete oral prednisone regimen.  -Refrain from further smoking.  -Follow closely with outpatient  PCP.    DISCHARGE DIAGNOSES  Principal Problem:    Acute exacerbation of chronic obstructive pulmonary disease (COPD) ([facility]) POA: Yes      Overview: Moderate obstructive lung disease  Active Problems:    Hyperlipidemia POA: Yes    Chronic renal failure, stage 3 (moderate) (HCC) POA: Yes    Essential hypertension POA: Yes    Tobacco abuse POA: Yes  Resolved Problems:    Acute respiratory failure with hypoxia ([facility]) POA: Yes    Elevated lactic acid level POA: Yes      FOLLOW UP  Future Appointments   Date Time Provider Department Center   2/18/2020  8:00 AM Robert Lindo M.D. [facility]   4/13/2020  7:45 AM [facility] None       MEDICATIONS ON DISCHARGE     Medication List      START taking these medications      Instructions   budesonide-formoterol 80-4.5 MCG/ACT Aero  Commonly known as:  SYMBICORT   Inhale 2 Puffs by mouth 2 Times a Day.  Dose:  2 Puff     predniSONE 20 MG Tabs  Commonly known as:  DELTASONE   Take 2 Tabs by mouth every day for 4 days.  Dose:  40 mg        CONTINUE taking these medications      Instructions   albuterol 108 (90 Base) MCG/ACT Aers inhalation aerosol   Doctor's comments:  Disp as Ventolin  Inhale 2 Puffs by mouth every 6 hours as needed for Shortness of Breath.  Dose:  2 Puff     aspirin EC 81 MG Tbec  Commonly known as:  ECOTRIN   Take 81 mg by mouth every day.  Dose:  81 mg     atorvastatin 40 MG Tabs  Commonly known as:  LIPITOR   Take 40 mg by mouth every evening.  Dose:  40 mg     clopidogrel 75 MG Tabs  Commonly known as:  PLAVIX   Take 75 mg by mouth every day.  Dose:  75 mg     cyanocobalamin 1000 MCG Tabs  Commonly known as:  VITAMIN B12   Take 1 Tab by mouth every morning with breakfast for 90 days.  Dose:  1,000 mcg     HYDROcodone-acetaminophen 5-325 MG Tabs per tablet  Start taking on:  February 3, 2020  Commonly known as:  NORCO   Take 1 Tab by mouth every 8 hours as needed for up to 30 days. Prescription must last 30 days.  Dose:  1  Tab     lisinopril 5 MG Tabs  Commonly known as:  PRINIVIL   Take 5 mg by mouth every day.  Dose:  5 mg     metoprolol SR 50 MG Tb24  Commonly known as:  TOPROL XL   Take 50 mg by mouth every day.  Dose:  50 mg     nitroglycerin 0.4 MG Subl  Commonly known as:  NITROSTAT   Place 0.4 mg under tongue every 5 minutes as needed for Chest Pain.  Dose:  0.4 mg            Allergies  Allergies   Allergen Reactions   • Chantix [Varenicline] Nausea     Made patient feel sick.   • Fish Vomiting       DIET  Orders Placed This Encounter   Procedures   • Diet Order Cardiac     Standing Status:   Standing     Number of Occurrences:   1     Order Specific Question:   Diet:     Answer:   Cardiac [6]       ACTIVITY  As tolerated.  Weight bearing as tolerated      LABORATORY  Lab Results   Component Value Date    SODIUM 137 01/12/2020    POTASSIUM 4.6 01/12/2020    CHLORIDE 108 01/12/2020    CO2 17 (L) 01/12/2020    GLUCOSE 133 (H) 01/12/2020    BUN 33 (H) 01/12/2020    CREATININE 1.45 (H) 01/12/2020        Lab Results   Component Value Date    WBC 13.6 (H) 01/12/2020    HEMOGLOBIN 11.6 (L) 01/12/2020    HEMATOCRIT 34.8 (L) 01/12/2020    PLATELETCT 169 01/12/2020        Total time of the discharge process was 36 minutes.

## 2020-01-16 LAB
BACTERIA BLD CULT: NORMAL
BACTERIA BLD CULT: NORMAL
SIGNIFICANT IND 70042: NORMAL
SIGNIFICANT IND 70042: NORMAL
SITE SITE: NORMAL
SITE SITE: NORMAL
SOURCE SOURCE: NORMAL
SOURCE SOURCE: NORMAL

## 2020-01-20 ENCOUNTER — OFFICE VISIT (OUTPATIENT)
Dept: MEDICAL GROUP | Facility: MEDICAL CENTER | Age: 75
End: 2020-01-20
Attending: FAMILY MEDICINE
Payer: MEDICARE

## 2020-01-20 VITALS
WEIGHT: 168.3 LBS | OXYGEN SATURATION: 97 % | SYSTOLIC BLOOD PRESSURE: 110 MMHG | BODY MASS INDEX: 26.42 KG/M2 | HEART RATE: 80 BPM | RESPIRATION RATE: 16 BRPM | DIASTOLIC BLOOD PRESSURE: 50 MMHG | TEMPERATURE: 97.9 F | HEIGHT: 67 IN

## 2020-01-20 DIAGNOSIS — C44.319 BASAL CELL CARCINOMA (BCC) OF SKIN OF OTHER PART OF FACE: ICD-10-CM

## 2020-01-20 DIAGNOSIS — J43.1 PANLOBULAR EMPHYSEMA (HCC): ICD-10-CM

## 2020-01-20 DIAGNOSIS — I73.9 INTERMITTENT CLAUDICATION (HCC): ICD-10-CM

## 2020-01-20 PROCEDURE — 99214 OFFICE O/P EST MOD 30 MIN: CPT | Performed by: FAMILY MEDICINE

## 2020-01-20 PROCEDURE — 99213 OFFICE O/P EST LOW 20 MIN: CPT | Performed by: FAMILY MEDICINE

## 2020-01-20 RX ORDER — BUDESONIDE AND FORMOTEROL FUMARATE DIHYDRATE 80; 4.5 UG/1; UG/1
2 AEROSOL RESPIRATORY (INHALATION) 2 TIMES DAILY
Qty: 1 INHALER | Refills: 6 | Status: ON HOLD | OUTPATIENT
Start: 2020-01-20 | End: 2020-06-06

## 2020-01-20 NOTE — PROGRESS NOTES
Subjective:      Ángel Gonsalez Jr. is a 74 y.o. male who presents with Hospital Follow-up (respiratory issues)            HPI 1.  Pan lobular emphysema/COPD-patient was readmitted 1/11 through 1/13/2020 for acute COPD exacerbation to [facility].  On discharge he was placed on Symbicort 80/5 and was given a sample.  He currently does not have an albuterol rescue inhaler at home.  He has markedly reduced cigarettes and is only smoked 4 cigarettes in the past 9 days.  2.  Intermittent claudication-patient reports steadily worsening of the pain in the lateral aspect of both hips that will now come on after walking only a block and a half.  Patient will have to sit for about 10 minutes before pain subsides and he can walk another 1.5 blocks.  Not reporting leg edema or any leg ulcers.  3.  Basal cell carcinoma-patient was referred in April 2019 to dermatology for excision of a classic expanding basal cell carcinoma on his right temple.  The dermatology office apparently did not accept Medicaid so patient was not willing to pay a co-pay on the secondary portion of his bill and did not attend the clinic.  He reports that the thickened lesion continues to slowly expand but is painless.    ROS positive for cough negative for hemoptysis, fever, syncope  Social history-single, retired  Current medications-  Prior to Admission medications    Medication Sig Start Date End Date Taking? Authorizing Provider   budesonide-formoterol (SYMBICORT) 80-4.5 MCG/ACT Aerosol Inhale 2 Puffs by mouth 2 Times a Day. 1/20/20  Yes Robert Lindo M.D.   nitroglycerin (NITROSTAT) 0.4 MG SL Tab Place 0.4 mg under tongue every 5 minutes as needed for Chest Pain.    Physician Outpatient   clopidogrel (PLAVIX) 75 MG Tab Take 75 mg by mouth every day.    Physician Outpatient   cyanocobalamin (VITAMIN B12) 1000 MCG Tab Take 1 Tab by mouth every morning with breakfast for 90 days. 12/2/19 3/1/20  CHIVO [facility]   atorvastatin  "(LIPITOR) 40 MG Tab Take 40 mg by mouth every evening.    Physician Outpatient   lisinopril (PRINIVIL) 5 MG Tab Take 5 mg by mouth every day.    Physician Outpatient   metoprolol SR (TOPROL XL) 50 MG TABLET SR 24 HR Take 50 mg by mouth every day.    Physician Outpatient   HYDROcodone-acetaminophen (NORCO) 5-325 MG Tab per tablet Take 1 Tab by mouth every 8 hours as needed for up to 30 days. Prescription must last 30 days. 2/3/20 3/4/20  Robert Lindo M.D.   aspirin EC (ECOTRIN) 81 MG Tablet Delayed Response Take 81 mg by mouth every day.    Physician Outpatient   albuterol 108 (90 BASE) MCG/ACT Aero Soln inhalation aerosol Inhale 2 Puffs by mouth every 6 hours as needed for Shortness of Breath. 1/20/17   Robert Lindo M.D.          Objective:     /50 (BP Location: Left arm, Patient Position: Sitting, BP Cuff Size: Adult)   Pulse 80   Temp 36.6 °C (97.9 °F) (Temporal)   Resp 16   Ht 1.702 m (5' 7.01\")   Wt 76.3 kg (168 lb 4.8 oz)   SpO2 97%   BMI 26.35 kg/m²      Physical Exam  Gen.- alert, cooperative, in no acute distress  Neck- midline trachea, thyroid not enlarged or tender,supple, no cervical adenopathy  Chest-clear to auscultation and percussion with normal breath sounds. No retractions. Chest wall nontender  Cardiac- regular rhythm and rate. No murmur, thrill, or heave  Skin-16mm linear lesion on the right temple with rolled pleural pink edges, central depression with no current crusting    Psych-normal affect with good eye contact. Normal grooming. Oriented x4.         Assessment/Plan:       1. Basal cell carcinoma (BCC) of skin of other part of face    - REFERRAL TO PLASTIC SURGERY    2. Panlobular emphysema (HCC)      3. Intermittent claudication (HCC)    - US-ALEX SINGLE LEVEL BILAT; Future  Plan: 1.  Check ALEX to confirm vascular nature of patient's hip pain associated with ambulation (he also does have a history of single level back disease)  2.  Plastic surgery referral sent for " excision of right temple basal cell carcinoma  3.  Rx Symbicort 80/5, albuterol rescue inhaler  4.  Continue to avoid cigarettes  5.  Revisit 1 month

## 2020-02-18 ENCOUNTER — OFFICE VISIT (OUTPATIENT)
Dept: MEDICAL GROUP | Facility: MEDICAL CENTER | Age: 75
End: 2020-02-18
Attending: FAMILY MEDICINE
Payer: MEDICARE

## 2020-02-18 VITALS
BODY MASS INDEX: 26.53 KG/M2 | SYSTOLIC BLOOD PRESSURE: 128 MMHG | RESPIRATION RATE: 20 BRPM | DIASTOLIC BLOOD PRESSURE: 68 MMHG | OXYGEN SATURATION: 95 % | TEMPERATURE: 99.3 F | HEIGHT: 67 IN | HEART RATE: 68 BPM | WEIGHT: 169 LBS

## 2020-02-18 DIAGNOSIS — C44.319 BASAL CELL CARCINOMA (BCC) OF SKIN OF OTHER PART OF FACE: ICD-10-CM

## 2020-02-18 DIAGNOSIS — M19.90 ARTHRITIS: ICD-10-CM

## 2020-02-18 DIAGNOSIS — I73.9 INTERMITTENT CLAUDICATION (HCC): ICD-10-CM

## 2020-02-18 DIAGNOSIS — M48.061 FORAMINAL STENOSIS OF LUMBAR REGION: ICD-10-CM

## 2020-02-18 PROCEDURE — 99213 OFFICE O/P EST LOW 20 MIN: CPT | Performed by: FAMILY MEDICINE

## 2020-02-18 RX ORDER — HYDROCODONE BITARTRATE AND ACETAMINOPHEN 5; 325 MG/1; MG/1
1 TABLET ORAL EVERY 8 HOURS PRN
Qty: 90 TAB | Refills: 0 | Status: SHIPPED | OUTPATIENT
Start: 2020-03-03 | End: 2020-03-26 | Stop reason: SDUPTHER

## 2020-02-18 NOTE — PROGRESS NOTES
"Subjective:      Ángel Gonsalez Jr. is a 74 y.o. male who presents with Follow-Up            HPI 1.  COPD-patient reports that he did not receive the ordered Symbicort.  He has used this in the past with benefit.  He just received a Ventolin inhaler which she uses 2 or 3 times a week and it is helpful.  Patient has reduced down to 2 cigarettes/day.  Does have a daily cough with clear sputum production.  2.  Intermittent claudication-patient notes continued pain in his legs with walking particularly in the area of his hips.  ALEX was ordered last month and patient has had no contact about scheduling.  We will check on the status of that order.  Patient continues to take Norco 5/325 up to 3 times daily.  Next prescription will be due on 3/3/2020.  3.  Basal cell carcinoma right temple-patient reports that he did go to see plastic surgery ([facility]) and they said they would contact him to get a preliminary biopsy and he has not had any contact in about 3 weeks.  He needs noticed intermittent crusting at that site but no overt bleeding no discharge  ROS negative for unexplained weight loss, chest pain, abdominal pain       Objective:     /68 (BP Location: Left arm, Patient Position: Sitting, BP Cuff Size: Adult)   Pulse 68   Temp 37.4 °C (99.3 °F) (Temporal)   Resp 20   Ht 1.702 m (5' 7.01\")   Wt 76.7 kg (169 lb)   SpO2 95%   BMI 26.46 kg/m²      Physical Exam  Gen.- alert, cooperative, in no acute distress  Neck- midline trachea, thyroid not enlarged or tender,supple, no cervical adenopathy  Chest-clear to auscultation and percussion with normal breath sounds. No retractions. Chest wall nontender  Cardiac- regular rhythm and rate. No murmur, thrill, or heave  Skin-1.5 cm area of erosion with a rounded pink border right temple, central crusting          Assessment/Plan:       1. Arthritis    - HYDROcodone-acetaminophen (NORCO) 5-325 MG Tab per tablet; Take 1 Tab by mouth every 8 hours as needed for " up to 30 days. Prescription must last 30 days.  Dispense: 90 Tab; Refill: 0    2. Foraminal stenosis of lumbar region    - HYDROcodone-acetaminophen (NORCO) 5-325 MG Tab per tablet; Take 1 Tab by mouth every 8 hours as needed for up to 30 days. Prescription must last 30 days.  Dispense: 90 Tab; Refill: 0    3. Basal cell carcinoma (BCC) of skin of other part of face      4. Intermittent claudication (HCC)    Plan: 1.  Will check on status of his ALEX order  2.  Patient will call plastic surgery back regarding getting scheduled for a preliminary biopsy  3.  Patient will check with his pharmacist regarding status of his Symbicort inhaler prescription or if there is a alternative that would be on his formulary.  4.  Revisit with me in 1 month

## 2020-02-21 ENCOUNTER — HOSPITAL ENCOUNTER (OUTPATIENT)
Dept: RADIOLOGY | Facility: MEDICAL CENTER | Age: 75
End: 2020-02-21
Attending: FAMILY MEDICINE
Payer: MEDICARE

## 2020-02-21 DIAGNOSIS — I73.9 INTERMITTENT CLAUDICATION (HCC): ICD-10-CM

## 2020-02-21 PROCEDURE — 93926 LOWER EXTREMITY STUDY: CPT | Mod: LT

## 2020-02-21 PROCEDURE — 93922 UPR/L XTREMITY ART 2 LEVELS: CPT

## 2020-02-21 PROCEDURE — 93926 LOWER EXTREMITY STUDY: CPT | Mod: 26,LT | Performed by: INTERNAL MEDICINE

## 2020-02-21 PROCEDURE — 93922 UPR/L XTREMITY ART 2 LEVELS: CPT | Mod: 26 | Performed by: INTERNAL MEDICINE

## 2020-03-11 ENCOUNTER — HOSPITAL ENCOUNTER (OUTPATIENT)
Dept: LAB | Facility: MEDICAL CENTER | Age: 75
End: 2020-03-11
Attending: PLASTIC SURGERY
Payer: MEDICARE

## 2020-03-11 LAB — PATHOLOGY CONSULT NOTE: NORMAL

## 2020-03-11 PROCEDURE — 88305 TISSUE EXAM BY PATHOLOGIST: CPT

## 2020-03-26 ENCOUNTER — OFFICE VISIT (OUTPATIENT)
Dept: MEDICAL GROUP | Facility: MEDICAL CENTER | Age: 75
End: 2020-03-26
Attending: FAMILY MEDICINE
Payer: MEDICARE

## 2020-03-26 VITALS
RESPIRATION RATE: 16 BRPM | DIASTOLIC BLOOD PRESSURE: 68 MMHG | BODY MASS INDEX: 27 KG/M2 | TEMPERATURE: 97.6 F | WEIGHT: 172 LBS | SYSTOLIC BLOOD PRESSURE: 110 MMHG | OXYGEN SATURATION: 93 % | HEART RATE: 76 BPM | HEIGHT: 67 IN

## 2020-03-26 DIAGNOSIS — M48.061 FORAMINAL STENOSIS OF LUMBAR REGION: ICD-10-CM

## 2020-03-26 DIAGNOSIS — J43.1 PANLOBULAR EMPHYSEMA (HCC): ICD-10-CM

## 2020-03-26 DIAGNOSIS — Z95.5 S/P CORONARY ARTERY STENT PLACEMENT: ICD-10-CM

## 2020-03-26 DIAGNOSIS — M19.90 ARTHRITIS: ICD-10-CM

## 2020-03-26 DIAGNOSIS — I73.9 INTERMITTENT CLAUDICATION (HCC): ICD-10-CM

## 2020-03-26 PROCEDURE — 99213 OFFICE O/P EST LOW 20 MIN: CPT | Performed by: FAMILY MEDICINE

## 2020-03-26 RX ORDER — HYDROCODONE BITARTRATE AND ACETAMINOPHEN 5; 325 MG/1; MG/1
1 TABLET ORAL EVERY 8 HOURS PRN
Qty: 90 TAB | Refills: 0 | Status: SHIPPED | OUTPATIENT
Start: 2020-06-02 | End: 2020-06-25 | Stop reason: SDUPTHER

## 2020-03-26 RX ORDER — ATORVASTATIN CALCIUM 40 MG/1
40 TABLET, FILM COATED ORAL DAILY
Qty: 30 TAB | Refills: 11 | Status: SHIPPED | OUTPATIENT
Start: 2020-03-26 | End: 2021-03-22 | Stop reason: SDUPTHER

## 2020-03-26 RX ORDER — METOPROLOL SUCCINATE 50 MG/1
50 TABLET, EXTENDED RELEASE ORAL DAILY
Qty: 30 TAB | Refills: 11 | Status: SHIPPED | OUTPATIENT
Start: 2020-03-26 | End: 2021-03-22 | Stop reason: SDUPTHER

## 2020-03-26 RX ORDER — HYDROCODONE BITARTRATE AND ACETAMINOPHEN 5; 325 MG/1; MG/1
1 TABLET ORAL EVERY 8 HOURS PRN
Qty: 90 TAB | Refills: 0 | Status: SHIPPED | OUTPATIENT
Start: 2020-04-03 | End: 2020-03-26 | Stop reason: SDUPTHER

## 2020-03-26 RX ORDER — LISINOPRIL 5 MG/1
5 TABLET ORAL DAILY
Qty: 30 TAB | Refills: 11 | Status: SHIPPED | OUTPATIENT
Start: 2020-03-26 | End: 2021-03-22 | Stop reason: SDUPTHER

## 2020-03-26 RX ORDER — HYDROCODONE BITARTRATE AND ACETAMINOPHEN 5; 325 MG/1; MG/1
1 TABLET ORAL EVERY 8 HOURS PRN
Qty: 90 TAB | Refills: 0 | Status: SHIPPED | OUTPATIENT
Start: 2020-05-03 | End: 2020-03-26 | Stop reason: SDUPTHER

## 2020-03-26 RX ORDER — CLOPIDOGREL BISULFATE 75 MG/1
75 TABLET ORAL DAILY
Qty: 30 TAB | Refills: 11 | Status: SHIPPED | OUTPATIENT
Start: 2020-03-26 | End: 2021-03-22 | Stop reason: SDUPTHER

## 2020-03-26 RX ORDER — ALBUTEROL SULFATE 90 UG/1
2 AEROSOL, METERED RESPIRATORY (INHALATION) EVERY 6 HOURS PRN
Qty: 8.5 G | Refills: 11 | Status: SHIPPED | OUTPATIENT
Start: 2020-03-26 | End: 2021-03-22 | Stop reason: SDUPTHER

## 2020-03-26 ASSESSMENT — FIBROSIS 4 INDEX: FIB4 SCORE: 2.34

## 2020-03-26 NOTE — PROGRESS NOTES
"Subjective:      Fredy Gonsalez Jr. is a 74 y.o. male who presents with Arthritis            HPI 1.  Emphysema-patient reports that he has not smoked any cigarettes whatsoever for the past 22 days.  He still has not gotten an answer from his pharmacy regarding whether Symbicort is a preferred medication and he does not have any long-acting steroid.  He does have his Ventolin which he uses several times per day.  Patient notes a morning smoker's cough with some sputum clearance.  2.  Coronary artery disease-patient is not reporting any current chest pain.  He is taking his metoprolol lisinopril and clopidogrel.  Not reporting any palpitations.    ROS negative for hemoptysis, chest pressure, abdominal pain       Objective:     /68 (BP Location: Left arm, Patient Position: Sitting, BP Cuff Size: Adult)   Pulse 76   Temp 36.4 °C (97.6 °F) (Temporal)   Resp 16   Ht 1.702 m (5' 7.01\")   Wt 78 kg (172 lb)   SpO2 93%   BMI 26.93 kg/m²      Physical Exam  Gen.- alert, cooperative, in no acute distress  Neck- midline trachea, thyroid not enlarged or tender,supple, no cervical adenopathy  Chest-clear to auscultation and percussion with normal breath sounds. No retractions. Chest wall nontender  Cardiac- regular rhythm and rate. No murmur, thrill, or heave  Skin-there is a healing 2 cm incision on his right temple with dissolving sutures (was positive for basal cell carcinoma, excised)          Assessment/Plan:       1. Intermittent claudication (HCC)    2. Panlobular emphysema (HCC)    - albuterol 108 (90 Base) MCG/ACT Aero Soln inhalation aerosol; Inhale 2 Puffs by mouth every 6 hours as needed for Shortness of Breath.  Dispense: 8.5 g; Refill: 11    3. S/P coronary artery stent placement    - clopidogrel (PLAVIX) 75 MG Tab; Take 1 Tab by mouth every day.  Dispense: 30 Tab; Refill: 11  - atorvastatin (LIPITOR) 40 MG Tab; Take 1 Tab by mouth every day.  Dispense: 30 Tab; Refill: 11  - lisinopril (PRINIVIL) " 5 MG Tab; Take 1 Tab by mouth every day.  Dispense: 30 Tab; Refill: 11  - metoprolol SR (TOPROL XL) 50 MG TABLET SR 24 HR; Take 1 Tab by mouth every day.  Dispense: 30 Tab; Refill: 11    4. Arthritis    - HYDROcodone-acetaminophen (NORCO) 5-325 MG Tab per tablet; Take 1 Tab by mouth every 8 hours as needed for up to 30 days. Prescription must last 30 days.  Dispense: 90 Tab; Refill: 0    5. Foraminal stenosis of lumbar region    - HYDROcodone-acetaminophen (NORCO) 5-325 MG Tab per tablet; Take 1 Tab by mouth every 8 hours as needed for up to 30 days. Prescription must last 30 days.  Dispense: 90 Tab; Refill: 0  Plan: 1.  Renew Norco x3 months  2.  Patient is encouraged to continue tobacco free  3.  Medications renewed today  4.  Revisit with me in 3 months or sooner if needed

## 2020-06-04 ENCOUNTER — APPOINTMENT (OUTPATIENT)
Dept: PULMONOLOGY | Facility: HOSPICE | Age: 75
End: 2020-06-04
Payer: MEDICARE

## 2020-06-06 ENCOUNTER — HOSPITAL ENCOUNTER (OUTPATIENT)
Facility: MEDICAL CENTER | Age: 75
End: 2020-06-07
Attending: EMERGENCY MEDICINE | Admitting: HOSPITALIST
Payer: MEDICARE

## 2020-06-06 ENCOUNTER — APPOINTMENT (OUTPATIENT)
Dept: RADIOLOGY | Facility: MEDICAL CENTER | Age: 75
End: 2020-06-06
Attending: EMERGENCY MEDICINE
Payer: MEDICARE

## 2020-06-06 DIAGNOSIS — R42 LIGHTHEADEDNESS: ICD-10-CM

## 2020-06-06 DIAGNOSIS — R53.1 GENERALIZED WEAKNESS: ICD-10-CM

## 2020-06-06 DIAGNOSIS — R55 NEAR SYNCOPE: ICD-10-CM

## 2020-06-06 LAB
ALBUMIN SERPL BCP-MCNC: 4 G/DL (ref 3.2–4.9)
ALBUMIN/GLOB SERPL: 1.3 G/DL
ALP SERPL-CCNC: 69 U/L (ref 30–99)
ALT SERPL-CCNC: 13 U/L (ref 2–50)
ANION GAP SERPL CALC-SCNC: 9 MMOL/L (ref 7–16)
AST SERPL-CCNC: 12 U/L (ref 12–45)
BASOPHILS # BLD AUTO: 0.4 % (ref 0–1.8)
BASOPHILS # BLD: 0.05 K/UL (ref 0–0.12)
BILIRUB SERPL-MCNC: 0.3 MG/DL (ref 0.1–1.5)
BUN SERPL-MCNC: 23 MG/DL (ref 8–22)
CALCIUM SERPL-MCNC: 9.2 MG/DL (ref 8.5–10.5)
CHLORIDE SERPL-SCNC: 105 MMOL/L (ref 96–112)
CO2 SERPL-SCNC: 20 MMOL/L (ref 20–33)
CREAT SERPL-MCNC: 1.53 MG/DL (ref 0.5–1.4)
EKG IMPRESSION: NORMAL
EOSINOPHIL # BLD AUTO: 0.11 K/UL (ref 0–0.51)
EOSINOPHIL NFR BLD: 0.9 % (ref 0–6.9)
ERYTHROCYTE [DISTWIDTH] IN BLOOD BY AUTOMATED COUNT: 49.3 FL (ref 35.9–50)
GLOBULIN SER CALC-MCNC: 3.1 G/DL (ref 1.9–3.5)
GLUCOSE SERPL-MCNC: 156 MG/DL (ref 65–99)
HCT VFR BLD AUTO: 42.8 % (ref 42–52)
HGB BLD-MCNC: 14.4 G/DL (ref 14–18)
IMM GRANULOCYTES # BLD AUTO: 0.05 K/UL (ref 0–0.11)
IMM GRANULOCYTES NFR BLD AUTO: 0.4 % (ref 0–0.9)
LYMPHOCYTES # BLD AUTO: 1.08 K/UL (ref 1–4.8)
LYMPHOCYTES NFR BLD: 9 % (ref 22–41)
MAGNESIUM SERPL-MCNC: 2 MG/DL (ref 1.5–2.5)
MCH RBC QN AUTO: 34 PG (ref 27–33)
MCHC RBC AUTO-ENTMCNC: 33.6 G/DL (ref 33.7–35.3)
MCV RBC AUTO: 101.2 FL (ref 81.4–97.8)
MONOCYTES # BLD AUTO: 0.42 K/UL (ref 0–0.85)
MONOCYTES NFR BLD AUTO: 3.5 % (ref 0–13.4)
NEUTROPHILS # BLD AUTO: 10.31 K/UL (ref 1.82–7.42)
NEUTROPHILS NFR BLD: 85.8 % (ref 44–72)
NRBC # BLD AUTO: 0 K/UL
NRBC BLD-RTO: 0 /100 WBC
PLATELET # BLD AUTO: 229 K/UL (ref 164–446)
PMV BLD AUTO: 9.7 FL (ref 9–12.9)
POTASSIUM SERPL-SCNC: 4.7 MMOL/L (ref 3.6–5.5)
PROT SERPL-MCNC: 7.1 G/DL (ref 6–8.2)
RBC # BLD AUTO: 4.23 M/UL (ref 4.7–6.1)
SODIUM SERPL-SCNC: 134 MMOL/L (ref 135–145)
T4 FREE SERPL-MCNC: 1.47 NG/DL (ref 0.93–1.7)
TROPONIN T SERPL-MCNC: 13 NG/L (ref 6–19)
TROPONIN T SERPL-MCNC: 15 NG/L (ref 6–19)
TSH SERPL DL<=0.005 MIU/L-ACNC: 1.09 UIU/ML (ref 0.38–5.33)
WBC # BLD AUTO: 12 K/UL (ref 4.8–10.8)

## 2020-06-06 PROCEDURE — A9270 NON-COVERED ITEM OR SERVICE: HCPCS | Performed by: HOSPITALIST

## 2020-06-06 PROCEDURE — 84439 ASSAY OF FREE THYROXINE: CPT

## 2020-06-06 PROCEDURE — 94760 N-INVAS EAR/PLS OXIMETRY 1: CPT

## 2020-06-06 PROCEDURE — 700105 HCHG RX REV CODE 258: Performed by: EMERGENCY MEDICINE

## 2020-06-06 PROCEDURE — 84443 ASSAY THYROID STIM HORMONE: CPT

## 2020-06-06 PROCEDURE — 700102 HCHG RX REV CODE 250 W/ 637 OVERRIDE(OP): Performed by: HOSPITALIST

## 2020-06-06 PROCEDURE — 93005 ELECTROCARDIOGRAM TRACING: CPT | Performed by: EMERGENCY MEDICINE

## 2020-06-06 PROCEDURE — 80053 COMPREHEN METABOLIC PANEL: CPT

## 2020-06-06 PROCEDURE — G0378 HOSPITAL OBSERVATION PER HR: HCPCS

## 2020-06-06 PROCEDURE — 94640 AIRWAY INHALATION TREATMENT: CPT

## 2020-06-06 PROCEDURE — 99285 EMERGENCY DEPT VISIT HI MDM: CPT

## 2020-06-06 PROCEDURE — 71045 X-RAY EXAM CHEST 1 VIEW: CPT

## 2020-06-06 PROCEDURE — 99220 PR INITIAL OBSERVATION CARE,LEVL III: CPT | Mod: 25 | Performed by: HOSPITALIST

## 2020-06-06 PROCEDURE — 99407 BEHAV CHNG SMOKING > 10 MIN: CPT | Performed by: HOSPITALIST

## 2020-06-06 PROCEDURE — 84484 ASSAY OF TROPONIN QUANT: CPT

## 2020-06-06 PROCEDURE — 85025 COMPLETE CBC W/AUTO DIFF WBC: CPT

## 2020-06-06 PROCEDURE — 83735 ASSAY OF MAGNESIUM: CPT

## 2020-06-06 PROCEDURE — 93005 ELECTROCARDIOGRAM TRACING: CPT

## 2020-06-06 RX ORDER — ALBUTEROL SULFATE 90 UG/1
2 AEROSOL, METERED RESPIRATORY (INHALATION) EVERY 6 HOURS PRN
Status: DISCONTINUED | OUTPATIENT
Start: 2020-06-06 | End: 2020-06-07 | Stop reason: HOSPADM

## 2020-06-06 RX ORDER — ONDANSETRON 4 MG/1
4 TABLET, ORALLY DISINTEGRATING ORAL EVERY 4 HOURS PRN
Status: DISCONTINUED | OUTPATIENT
Start: 2020-06-06 | End: 2020-06-07 | Stop reason: HOSPADM

## 2020-06-06 RX ORDER — ONDANSETRON 2 MG/ML
4 INJECTION INTRAMUSCULAR; INTRAVENOUS EVERY 4 HOURS PRN
Status: DISCONTINUED | OUTPATIENT
Start: 2020-06-06 | End: 2020-06-07 | Stop reason: HOSPADM

## 2020-06-06 RX ORDER — HYDROCODONE BITARTRATE AND ACETAMINOPHEN 5; 325 MG/1; MG/1
1 TABLET ORAL EVERY 8 HOURS PRN
Status: DISCONTINUED | OUTPATIENT
Start: 2020-06-06 | End: 2020-06-07 | Stop reason: HOSPADM

## 2020-06-06 RX ORDER — ACETAMINOPHEN 325 MG/1
650 TABLET ORAL EVERY 6 HOURS PRN
Status: DISCONTINUED | OUTPATIENT
Start: 2020-06-06 | End: 2020-06-07 | Stop reason: HOSPADM

## 2020-06-06 RX ORDER — LABETALOL HYDROCHLORIDE 5 MG/ML
10 INJECTION, SOLUTION INTRAVENOUS EVERY 4 HOURS PRN
Status: DISCONTINUED | OUTPATIENT
Start: 2020-06-06 | End: 2020-06-07 | Stop reason: HOSPADM

## 2020-06-06 RX ORDER — BUDESONIDE AND FORMOTEROL FUMARATE DIHYDRATE 80; 4.5 UG/1; UG/1
2 AEROSOL RESPIRATORY (INHALATION) 2 TIMES DAILY
Status: DISCONTINUED | OUTPATIENT
Start: 2020-06-06 | End: 2020-06-06

## 2020-06-06 RX ORDER — ATORVASTATIN CALCIUM 40 MG/1
40 TABLET, FILM COATED ORAL EVERY EVENING
Status: DISCONTINUED | OUTPATIENT
Start: 2020-06-06 | End: 2020-06-07 | Stop reason: HOSPADM

## 2020-06-06 RX ORDER — NITROGLYCERIN 0.4 MG/1
0.4 TABLET SUBLINGUAL
Status: DISCONTINUED | OUTPATIENT
Start: 2020-06-06 | End: 2020-06-07 | Stop reason: HOSPADM

## 2020-06-06 RX ORDER — LISINOPRIL 10 MG/1
5 TABLET ORAL DAILY
Status: DISCONTINUED | OUTPATIENT
Start: 2020-06-07 | End: 2020-06-07 | Stop reason: HOSPADM

## 2020-06-06 RX ORDER — METOPROLOL SUCCINATE 50 MG/1
50 TABLET, EXTENDED RELEASE ORAL DAILY
Status: DISCONTINUED | OUTPATIENT
Start: 2020-06-07 | End: 2020-06-07 | Stop reason: HOSPADM

## 2020-06-06 RX ORDER — POLYETHYLENE GLYCOL 3350 17 G/17G
1 POWDER, FOR SOLUTION ORAL
Status: DISCONTINUED | OUTPATIENT
Start: 2020-06-06 | End: 2020-06-07 | Stop reason: HOSPADM

## 2020-06-06 RX ORDER — AMOXICILLIN 250 MG
2 CAPSULE ORAL 2 TIMES DAILY
Status: DISCONTINUED | OUTPATIENT
Start: 2020-06-06 | End: 2020-06-07 | Stop reason: HOSPADM

## 2020-06-06 RX ORDER — BUDESONIDE AND FORMOTEROL FUMARATE DIHYDRATE 80; 4.5 UG/1; UG/1
2 AEROSOL RESPIRATORY (INHALATION)
Status: DISCONTINUED | OUTPATIENT
Start: 2020-06-06 | End: 2020-06-07 | Stop reason: HOSPADM

## 2020-06-06 RX ORDER — SODIUM CHLORIDE 9 MG/ML
1000 INJECTION, SOLUTION INTRAVENOUS ONCE
Status: COMPLETED | OUTPATIENT
Start: 2020-06-06 | End: 2020-06-06

## 2020-06-06 RX ORDER — CLOPIDOGREL BISULFATE 75 MG/1
75 TABLET ORAL DAILY
Status: DISCONTINUED | OUTPATIENT
Start: 2020-06-07 | End: 2020-06-07 | Stop reason: HOSPADM

## 2020-06-06 RX ORDER — BISACODYL 10 MG
10 SUPPOSITORY, RECTAL RECTAL
Status: DISCONTINUED | OUTPATIENT
Start: 2020-06-06 | End: 2020-06-07 | Stop reason: HOSPADM

## 2020-06-06 RX ADMIN — BUDESONIDE AND FORMOTEROL FUMARATE DIHYDRATE 2 PUFF: 80; 4.5 AEROSOL RESPIRATORY (INHALATION) at 20:45

## 2020-06-06 RX ADMIN — ALBUTEROL SULFATE 2 PUFF: 90 AEROSOL, METERED RESPIRATORY (INHALATION) at 20:44

## 2020-06-06 RX ADMIN — SODIUM CHLORIDE 1000 ML: 9 INJECTION, SOLUTION INTRAVENOUS at 11:58

## 2020-06-06 ASSESSMENT — ENCOUNTER SYMPTOMS
LOSS OF CONSCIOUSNESS: 0
SHORTNESS OF BREATH: 1
NAUSEA: 0
HEARTBURN: 0
HEADACHES: 1
GASTROINTESTINAL NEGATIVE: 1
WHEEZING: 0
VOMITING: 0
HEMOPTYSIS: 0
DIZZINESS: 1
CONSTIPATION: 0
BRUISES/BLEEDS EASILY: 0
NERVOUS/ANXIOUS: 0
EYES NEGATIVE: 1
DIARRHEA: 0
COUGH: 0
MYALGIAS: 1
CARDIOVASCULAR NEGATIVE: 1
FOCAL WEAKNESS: 0
PSYCHIATRIC NEGATIVE: 1
PALPITATIONS: 0
WEAKNESS: 1
ABDOMINAL PAIN: 0
DOUBLE VISION: 0
FEVER: 0
CONSTITUTIONAL NEGATIVE: 1
CHILLS: 0
SEIZURES: 0
BLOOD IN STOOL: 0
DIAPHORESIS: 0

## 2020-06-06 ASSESSMENT — LIFESTYLE VARIABLES
TOTAL SCORE: 0
EVER FELT BAD OR GUILTY ABOUT YOUR DRINKING: NO
EVER_SMOKED: YES
HAVE PEOPLE ANNOYED YOU BY CRITICIZING YOUR DRINKING: NO
TOTAL SCORE: 0
HOW MANY TIMES IN THE PAST YEAR HAVE YOU HAD 5 OR MORE DRINKS IN A DAY: 0
AVERAGE NUMBER OF DAYS PER WEEK YOU HAVE A DRINK CONTAINING ALCOHOL: 0
EVER HAD A DRINK FIRST THING IN THE MORNING TO STEADY YOUR NERVES TO GET RID OF A HANGOVER: NO
ON A TYPICAL DAY WHEN YOU DRINK ALCOHOL HOW MANY DRINKS DO YOU HAVE: 0
ALCOHOL_USE: NO
HAVE YOU EVER FELT YOU SHOULD CUT DOWN ON YOUR DRINKING: NO
CONSUMPTION TOTAL: NEGATIVE
TOTAL SCORE: 0

## 2020-06-06 ASSESSMENT — FIBROSIS 4 INDEX
FIB4 SCORE: 2.34
FIB4 SCORE: 1.05

## 2020-06-06 ASSESSMENT — PATIENT HEALTH QUESTIONNAIRE - PHQ9
SUM OF ALL RESPONSES TO PHQ9 QUESTIONS 1 AND 2: 0
1. LITTLE INTEREST OR PLEASURE IN DOING THINGS: NOT AT ALL
2. FEELING DOWN, DEPRESSED, IRRITABLE, OR HOPELESS: NOT AT ALL

## 2020-06-06 NOTE — ASSESSMENT & PLAN NOTE
Admission medication history interview status for the 8/5/2018  admission is complete. See EPIC admission navigator for prior to admission medications     Medication history source reliability:Good    Actions taken by pharmacist (provider contacted, etc): patient went through her list with me     Additional medication history information not noted on PTA med list :None    Medication reconciliation/reorder completed by provider prior to medication history? Yes    Time spent in this activity: 30 min    Prior to Admission medications    Medication Sig Last Dose Taking? Auth Provider   Acetaminophen (TYLENOL PO) Take 650-975 mg by mouth every 8 hours as needed for mild pain or fever  8/5/2018 at Unknown time Yes Unknown, Entered By History   alendronate (FOSAMAX) 70 MG tablet Take 1 tablet (70 mg) by mouth with 8oz water every 7 days 30 minutes before breakfast and remain upright during this time. 8/2/2018 Yes Sarah Vaughn MD   ALPRAZolam (XANAX PO) Take 2 mg by mouth daily as needed for anxiety 8/5/2018 at Unknown time Yes Unknown, Entered By History   ALPRAZOLAM PO Take 2 mg by mouth 2 times daily 8/5/2018 at Unknown time Yes Unknown, Entered By History   Ascorbic Acid (VITAMIN C PO) Take 500 mg by mouth daily 8/5/2018 at am Yes Unknown, Entered By History   busPIRone (BUSPAR) 5 MG tablet Take 5 mg by mouth 2 times daily  8/5/2018 at Unknown time Yes Reported, Patient   BusPIRone HCl (BUSPAR PO) Take 5 mg by mouth daily as needed 8/5/2018 at Unknown time Yes Unknown, Entered By History   calcium carbonate (TUMS) 500 MG chewable tablet Take 1-1.5 chew tab by mouth At Bedtime  8/5/2018 at Unknown time Yes Unknown, Entered By History   calcium-vitamin D (CALCIUM 600 + D) 600-400 MG-UNIT per tablet Take 1 tablet by mouth daily Past Week at Unknown time Yes Bee Green MD   CELLCEPT (BRAND) 500 MG TABLET Take 1,000 mg by mouth 2 times daily  8/5/2018 at Unknown time Yes Sarah Vaughn MD   cetirizine  Patient has mild hyperglycemia check hemoglobin A1c.   (ZYRTEC) 10 MG tablet TAKE 1 TABLET(10 MG) BY MOUTH DAILY 8/5/2018 at pm Yes Sarah Vaughn MD   cholecalciferol (VITAMIN D) 1000 UNIT tablet Take 1,000 Units by mouth daily  8/5/2018 at Unknown time Yes Bee Green MD   COMBIVENT RESPIMAT  MCG/ACT inhaler Inhale 1 puff into the lungs 4 times daily 8/5/2018 at Unknown time Yes Sarah Vaughn MD   EPINEPHrine (EPIPEN 2-JULIETTE) 0.3 MG/0.3ML injection Inject 0.3 mLs (0.3 mg) into the muscle once as needed for anaphylaxis prn Yes Neyda Taylor APRN CNP   Furosemide (LASIX PO) Take 40 mg by mouth 2 times daily as needed prn Yes Unknown, Entered By History   Glucosamine-Chondroitin (OSTEO BI-FLEX REGULAR STRENGTH PO) Take 1 tablet by mouth 2 times daily 8/5/2018 at Unknown time Yes Unknown, Entered By History   HYDROmorphone (DILAUDID) 4 MG tablet Take 1 tablet (4 mg) by mouth every 6 hours as needed for breakthrough pain or severe pain Do not take at the same time as your oxycodone. prn Yes Sarah Vaughn MD   lidocaine (LIDODERM) 5 % Patch APPLY UP TO 3 PATCHES TO PAINFUL AREA ALL AT ONCE FOR UP TO 12 HOURS WITHIN A 24 HOUR PERIOD. REMOVE AFTER 12 HOURS.  Patient taking differently: as needed APPLY UP TO 3 PATCHES TO PAINFUL AREA ALL AT ONCE FOR UP TO 12 HOURS WITHIN A 24 HOUR PERIOD. REMOVE AFTER 12 HOURS. prn Yes Sarah Vaughn MD   Loperamide HCl (IMODIUM A-D PO) Take 2 mg by mouth 4 times daily as needed prn Yes Unknown, Entered By History   methocarbamol (ROBAXIN) 500 MG tablet Take 1-2 tablets (500-1,000 mg) by mouth 3 times daily as needed for muscle spasms prn Yes Sarah Vaughn MD   mirabegron (MYRBETRIQ) 50 MG 24 hr tablet Take 1 tablet (50 mg) by mouth daily 8/6/2018 at am Yes Char Huang MD   ondansetron (ZOFRAN ODT) 4 MG ODT tab Take 1-2 tablets (4-8 mg) by mouth every 8 hours as needed for nausea  Yes Sarah Vaughn MD   ondansetron (ZOFRAN) 4 MG tablet TAKE 1 TO 2 TABLETS BY MOUTH EVERY 8 HOURS AS NEEDED  Yes Johana  Sarah Pina MD   oxyCODONE-acetaminophen (PERCOCET) 5-325 MG per tablet Take 1 tablet by mouth every 6 hours as needed for pain  Patient taking differently: Take 1-2 tablets by mouth 3 times daily as needed for pain  8/5/2018 at Unknown time Yes Sarah Vaughn MD   PREDNISONE PO Take 15 mg by mouth daily 8/5/2018 at am Yes Unknown, Entered By History   Probiotic Product (PROBIOTIC DAILY PO) Take 1 capsule by mouth every evening Past Week at Unknown time Yes Unknown, Entered By History   pyridOXINE (VITAMIN B-6) 50 MG tablet Take 1 tablet (50 mg) by mouth daily 8/5/2018 at pm Yes Sarah Vaughn MD   sertraline (ZOLOFT) 100 MG tablet Take 2 tablets (200 mg) by mouth daily 8/5/2018 at am Yes Sarah Vaughn MD   Specialty Vitamins Products (BIOTIN PLUS KERATIN) 43775-446 MCG-MG TABS Take 1 tablet by mouth every evening 8/5/2018 at Unknown time Yes Unknown, Entered By History   VENTOLIN  (90 Base) MCG/ACT Inhaler INHALE 2 PUFFS BY MOUTH EVERY 6 HOURS AS NEEDED FOR SHORTNESS OF BREATH/ DYSPNEA/ WHEEZING prn Yes Sarah Vaughn MD   WARFARIN SODIUM PO Take 3.75 mg by mouth daily 8/4/2018 Yes Unknown, Entered By History   ACE/ARB NOT PRESCRIBED, INTENTIONAL, Please choose reason not prescribed, below   Sarah Vaughn MD   ASPIRIN NOT PRESCRIBED (INTENTIONAL) Reported on 5/5/2017   Sarah Vaughn MD   Incontinence Supply Disposable (ULTIMA INCONTINENCE PAD) MISC 1 each 3 times daily   Juana Bolanos MD   order for DME Equipment being ordered: Toilet Seat Riser   Juana Bolanos MD   order for DME Equipment being ordered: Walker, rollator type with 4 wheels, brakes, and a seat. Extra-wide and tall.   Juana Bolanos MD   order for DME Equipment being ordered: Electric Chair Lift   Juana Bolanos MD   order for DME Equipment being ordered: Electric Scooter, that can come apart in order to fit in the car.   Juana Bolanos MD   order for DME Prevall Fluff under pads  23 x 36 inches. (FQUP-[id number])   Juana Bolanos MD   order for DME Poise - overnight, long pads #6 absorbancy   Juana Bolanos MD   order for DME Glenna Super pads for night time([id number])   Juana Bolanos MD   order for DME Pull ips - Prevail XL underwear ([id number]   Juana Bolanos MD   order for DME Prevall panti-liners, overnight   Juana Bolanos MD   order for DME Disposable underwear/pullups.  Size XXL   Juana Bolanos MD   order for DME Chucks underpad   Juana Bolanos MD   STATIN NOT PRESCRIBED, INTENTIONAL, 1 each daily Statin not prescribed intentionally due to Rhabdomyolysis (Polymyositis and CK elevation)   Sarah Vaughn MD   VENTOLIN  (90 BASE) MCG/ACT Inhaler INHALE 2 PUFFS BY MOUTH EVERY 6 HOURS AS NEEDED FOR SHORTNESS OF BREATH/ DYSPNEA/ WHEEZING   Sarah Vaughn MD

## 2020-06-06 NOTE — ASSESSMENT & PLAN NOTE
Continue with low-fat low-cholesterol diet  Continue with Lipitor 40 mg p.o. nightly  Follow-up on fasting lipid panel.

## 2020-06-06 NOTE — ED TRIAGE NOTES
"Pt started feeling generalized weakness approx 2 hours ago with almost \"passing out\". Hx of MI. Pt with COPD, pt wears home o2 at night 2L.   "

## 2020-06-06 NOTE — ED NOTES
"Agree with triage note. Pt denies any pain. C/o generalized weakness over past 2 hours. +cough but states \"this is my normal cough.\"     Pt a/o x4, speaking in full sentences.   "

## 2020-06-06 NOTE — ASSESSMENT & PLAN NOTE
Patient does have established coronary artery disease.  At this point I am going to continue on metoprolol, Aspirin, lisinopril and Lipitor.  Currently patient does not have any chest pain.  He says he did not have chest pain prior to his heart attack as well.  He does have three-vessel bypass surgery 2 years ago.  If the patient continues to be lightheaded and near syncopal the patient may benefit from a cardiac stress test as well.

## 2020-06-06 NOTE — ED PROVIDER NOTES
ED Provider Note    CHIEF COMPLAINT  Chief Complaint   Patient presents with   • Weakness   • Near Syncopal       HPI  Fredy Gonsalez Jr. is a 74 y.o. male who presents with generalized weakness.  Patient states when he stands up he feels like he is going to pass out.  He denies any pain anywhere.  No chest pain.  No shortness of breath.  No headache.  No abdominal pain.  No fevers or cough.  Denies any recent exposure to COVID.  No history of weakness problems in the past.  Does have a history significant for MI with bypass.  Patient also has a history of COPD but denies any increased oxygen use.  He only uses oxygen at night.  He denies any back pain.  No pain when he urinates.  He denies any one-sided numbness or weakness.  Denies any speech problems or blurry vision.    REVIEW OF SYSTEMS  See HPI for further details.  No syncope, bloody stools or black stools, fevers, worsening difficulty breathing, leg swelling or calf pain, abdominal pain, bleeding tendency, hyperglycemia, back pain, chest pain, headache.  all other systems are negative.     PAST MEDICAL HISTORY  Past Medical History:   Diagnosis Date   • Essential hypertension 12/2/2019   • Heart attack (HCC) 12/02/2017    received 3 stents, [facility]   • Hyperlipidemia 4/17/2012   • Arthritis    • COPD    • EMPHYSEMA    • Pain in joint, multiple sites        FAMILY HISTORY  [unfilled]    SOCIAL HISTORY  Social History     Socioeconomic History   • Marital status: Single     Spouse name: Not on file   • Number of children: Not on file   • Years of education: Not on file   • Highest education level: Not on file   Occupational History   • Not on file   Social Needs   • Financial resource strain: Not on file   • Food insecurity     Worry: Never true     Inability: Never true   • Transportation needs     Medical: No     Non-medical: No   Tobacco Use   • Smoking status: Current Every Day Smoker     Packs/day: 1.00     Years: 60.00     Pack years:  60.00     Types: Cigarettes     Start date: 6/15/1957   • Smokeless tobacco: Former User     Types: Chew     Quit date: 1957   Substance and Sexual Activity   • Alcohol use: No     Alcohol/week: 0.0 oz     Comment: quit 28yrs ago   • Drug use: No     Types: Marijuana     Comment: quit in 1969, THC Hash    • Sexual activity: Never     Partners: Female     Birth control/protection: Condom   Lifestyle   • Physical activity     Days per week: Not on file     Minutes per session: Not on file   • Stress: Not on file   Relationships   • Social connections     Talks on phone: Not on file     Gets together: Not on file     Attends Jain service: Not on file     Active member of club or organization: Not on file     Attends meetings of clubs or organizations: Not on file     Relationship status: Not on file   • Intimate partner violence     Fear of current or ex partner: Not on file     Emotionally abused: Not on file     Physically abused: Not on file     Forced sexual activity: Not on file   Other Topics Concern   • Not on file   Social History Narrative   • Not on file       SURGICAL HISTORY  Past Surgical History:   Procedure Laterality Date   • STENT PLACEMENT  12/02/2017    left main to proximal LAD, mid LAD, ostial left main   • OPEN REDUCTION      left arm. 32 yrs ago       CURRENT MEDICATIONS   Home Medications    **Home medications have not yet been reviewed for this encounter**         ALLERGIES  Allergies   Allergen Reactions   • Chantix [Varenicline] Nausea     Made patient feel sick.   • Fish Vomiting       PHYSICAL EXAM  VITAL SIGNS: /58   Pulse 79   Temp 36.1 °C (96.9 °F) (Temporal)   Resp 16   Wt 78 kg (171 lb 15.3 oz)   SpO2 97%   BMI 26.93 kg/m²       Constitutional: Well developed, No acute distress, Non-toxic appearance.   HENT: Normocephalic, Atraumatic, Bilateral external ears normal, Oropharynx dry  Eyes: PERRL, EOMI, Conjunctiva normal   Neck: Normal range of motion, No tenderness,  Supple  Cardiovascular: Normal heart rate, Normal rhythm  Thorax & Lungs: Normal breath sounds, No respiratory distress, No wheezing, No chest tenderness.   Abdomen: Benign abdominal exam, no guarding no rebound, no tenderness, no distention, no pulsatile mass  Skin: Warm, Dry, No erythema, No rash.    Back: No tenderness, No CVA tenderness.   Extremities: Intact distal pulses, No edema, No tenderness   Neurologic: Alert & oriented x 3, Normal motor function, Normal sensory function, No focal deficits noted.   Psychiatric: appropriate      Labs  Results for orders placed or performed during the hospital encounter of 06/06/20   CBC WITH DIFFERENTIAL   Result Value Ref Range    WBC 12.0 (H) 4.8 - 10.8 K/uL    RBC 4.23 (L) 4.70 - 6.10 M/uL    Hemoglobin 14.4 14.0 - 18.0 g/dL    Hematocrit 42.8 42.0 - 52.0 %    .2 (H) 81.4 - 97.8 fL    MCH 34.0 (H) 27.0 - 33.0 pg    MCHC 33.6 (L) 33.7 - 35.3 g/dL    RDW 49.3 35.9 - 50.0 fL    Platelet Count 229 164 - 446 K/uL    MPV 9.7 9.0 - 12.9 fL    Neutrophils-Polys 85.80 (H) 44.00 - 72.00 %    Lymphocytes 9.00 (L) 22.00 - 41.00 %    Monocytes 3.50 0.00 - 13.40 %    Eosinophils 0.90 0.00 - 6.90 %    Basophils 0.40 0.00 - 1.80 %    Immature Granulocytes 0.40 0.00 - 0.90 %    Nucleated RBC 0.00 /100 WBC    Neutrophils (Absolute) 10.31 (H) 1.82 - 7.42 K/uL    Lymphs (Absolute) 1.08 1.00 - 4.80 K/uL    Monos (Absolute) 0.42 0.00 - 0.85 K/uL    Eos (Absolute) 0.11 0.00 - 0.51 K/uL    Baso (Absolute) 0.05 0.00 - 0.12 K/uL    Immature Granulocytes (abs) 0.05 0.00 - 0.11 K/uL    NRBC (Absolute) 0.00 K/uL   COMP METABOLIC PANEL   Result Value Ref Range    Sodium 134 (L) 135 - 145 mmol/L    Potassium 4.7 3.6 - 5.5 mmol/L    Chloride 105 96 - 112 mmol/L    Co2 20 20 - 33 mmol/L    Anion Gap 9.0 7.0 - 16.0    Glucose 156 (H) 65 - 99 mg/dL    Bun 23 (H) 8 - 22 mg/dL    Creatinine 1.53 (H) 0.50 - 1.40 mg/dL    Calcium 9.2 8.5 - 10.5 mg/dL    AST(SGOT) 12 12 - 45 U/L    ALT(SGPT) 13 2 - 50  U/L    Alkaline Phosphatase 69 30 - 99 U/L    Total Bilirubin 0.3 0.1 - 1.5 mg/dL    Albumin 4.0 3.2 - 4.9 g/dL    Total Protein 7.1 6.0 - 8.2 g/dL    Globulin 3.1 1.9 - 3.5 g/dL    A-G Ratio 1.3 g/dL   TROPONIN   Result Value Ref Range    Troponin T 15 6 - 19 ng/L   ESTIMATED GFR   Result Value Ref Range    GFR If  54 (A) >60 mL/min/1.73 m 2    GFR If Non African American 45 (A) >60 mL/min/1.73 m 2   EKG (NOW)   Result Value Ref Range    Report       [facility] Emergency Dept.    Test Date:  2020  Pt Name:    TOSHIA VERGARA             Department: ER  MRN:        [id number]                      Room:  Gender:     Male                         Technician: [id number]  :        [id number]                   Requested By:ER TRIAGE PROTOCOL  Order #:    [id number]                    Reading MD: Queenie Nicolas    Measurements  Intervals                                Axis  Rate:       79                           P:          65  KS:         146                          QRS:        -11  QRSD:       111                          T:          4  QT:         395  QTc:        453    Interpretive Statements  Sinus rhythm  RIGHT BUNDLE BRANCH BLOCK  Inferior infarct, old  Compared to ECG 2020 16:59:06  Electronically Signed On 2020 14:02:42 PDT by Queenie Nicolas         RADIOLOGY/PROCEDURES  DX-CHEST-PORTABLE (1 VIEW)   Final Result      1.  Mild chronic interstitial changes.   2.  No acute or new abnormality.          COURSE & MEDICAL DECISION MAKING  Pertinent Labs & Imaging studies reviewed. (See chart for details)    Patient presents to the emergency department with a near syncopal episode.  He is complaining of profound weakness.  He has no focal findings on neurologic exam.  He does not complain of any one-sided numbness or weakness.  He has a normal speech.  He was able to take a bus here however he states he felt like he was going to pass out multiple times while on the  bus.  He states he does not feel safe to stand up and walk.  He has a history of cardiac problems in the past but does not complain any chest pain.  EKG shows no evidence of acute MI.  Patient has a history of COPD but is not currently hypoxic or any acute respiratory distress.  He is afebrile and therefore I doubt an infectious etiology for his symptoms.  He does look mildly dry on physical exam and therefore will give IV fluids.  We will check laboratory studies to evaluate for possible electrolyte abnormalities.  He denies any history of bloody stools that would suggest a GI bleed causing his weakness.    Laboratory studies have returned and show normal troponin.  Patient does have evidence of renal insufficiency but this appears mostly chronic.  He has a slight elevation of this today which could suggest some mild dehydration.  Patient has a white count of 12.  Chest x-ray shows no evidence of pneumonia.  Repeat neurologic exam is intact.  He has a benign abdominal exam.  At this point it is unclear what is causing his symptoms.  However I do not feel he safe for outpatient management.  He will be hospitalized for further IV fluid management and monitoring.    Discussed the case with the hospitalist who will see the patient.    FINAL IMPRESSION     1. Generalized weakness    2. Lightheadedness    3. Near syncope             Electronically signed by: Queenie Duran M.D., 6/6/2020 11:47 AM

## 2020-06-06 NOTE — ASSESSMENT & PLAN NOTE
Patient does have chronic COPD as he smokes 1 pack/day.  He has been educated on smoking cessation but so far has not quit.  Patient will be placed on RT protocol  He does use oxygen as an outpatient nocturnally only.  He will be provided oxygen as needed to keep oxygen saturations above 90%.

## 2020-06-06 NOTE — ED NOTES
Med rec complete per pharmacy. Pt unavailable for interview by phone. Unable to review allergies and last dose. No ABX filled in last 14 days per pharmacy.

## 2020-06-06 NOTE — H&P
Hospital Medicine History & Physical Note    Date of Service  6/6/2020    Primary Care Physician  Robert Lindo M.D.    Code Status  Full Code    Chief Complaint  Chief Complaint   Patient presents with   • Weakness   • Near Syncopal       History of Presenting Illness  74 y.o. male who presented 6/6/2020 with lightheadedness and near syncope.  The patient states that he was walking on the street when he suddenly became lightheaded.  He says last time this happened he had a heart attack thus he came to the hospital immediately.  The last occasion was about 2 years ago.  The patient at this point was evaluated in the emergency room.  He is blood pressure at this point is normal his EKG was evaluated and does have a chronic right bundle branch block but otherwise no acute abnormalities.  Troponin at this point is at 15.  We will be monitoring this for elevation.  His renal functions are slightly abnormal.  BUN is 23 with a creatinine 1.53 but this is pretty much his baseline.  Otherwise electrolytes are normal.  He is not anemic at this point in time.  He does have however macrocytosis.  Patient at this point will be value with orthostatic blood pressure.  We will also repeat at this point an echocardiogram as the patient did have a CABG 2 years ago.  We will get at this point an echocardiogram as well as serial troponins as well as thyroid function and adrenal functions will be evaluated.    Review of Systems  Review of Systems   Constitutional: Negative.  Negative for chills, diaphoresis and fever.   HENT: Negative.    Eyes: Negative.  Negative for double vision.   Respiratory: Positive for shortness of breath. Negative for cough, hemoptysis and wheezing.    Cardiovascular: Negative.  Negative for chest pain, palpitations and leg swelling.   Gastrointestinal: Negative.  Negative for abdominal pain, blood in stool, constipation, diarrhea, heartburn, nausea and vomiting.   Genitourinary: Negative.  Negative for  frequency, hematuria and urgency.   Musculoskeletal: Positive for joint pain and myalgias.   Skin: Negative.  Negative for itching and rash.   Neurological: Positive for dizziness, weakness and headaches. Negative for focal weakness, seizures and loss of consciousness.   Endo/Heme/Allergies: Negative.  Does not bruise/bleed easily.   Psychiatric/Behavioral: Negative.  Negative for suicidal ideas. The patient is not nervous/anxious.    All other systems reviewed and are negative.      Past Medical History   has a past medical history of Arthritis, COPD, EMPHYSEMA, Essential hypertension (12/2/2019), Heart attack (HCC) (12/02/2017), Hyperlipidemia (4/17/2012), and Pain in joint, multiple sites.    Surgical History   has a past surgical history that includes open reduction and stent placement (12/02/2017).     Family History  family history includes Alzheimer's Disease in his mother; Arthritis in his mother; Kidney Disease in his father; Lung Disease in his mother; Osteoporosis in his mother; Other in his mother.     Social History   reports that he has been smoking cigarettes. He started smoking about 63 years ago. He has a 60.00 pack-year smoking history. He quit smokeless tobacco use about 63 years ago.  His smokeless tobacco use included chew. He reports that he does not drink alcohol or use drugs.    Allergies  Allergies   Allergen Reactions   • Chantix [Varenicline] Nausea     Made patient feel sick.   • Fish Vomiting       Medications  Prior to Admission Medications   Prescriptions Last Dose Informant Patient Reported? Taking?   HYDROcodone-acetaminophen (NORCO) 5-325 MG Tab per tablet 5/6/2020 at [facility] Patient No No   Sig: Take 1 Tab by mouth every 8 hours as needed for up to 30 days. Prescription must last 30 days.   albuterol 108 (90 Base) MCG/ACT Aero Soln inhalation aerosol 6/3/2020 at [facility] Patient No No   Sig: Inhale 2 Puffs by mouth every 6 hours as needed for Shortness of Breath.   aspirin EC (ECOTRIN) 81  MG Tablet Delayed Response 6/6/2020 at [facility] Patient Yes No   Sig: Take 81 mg by mouth every day.   atorvastatin (LIPITOR) 40 MG Tab 6/6/2020 at [facility] Patient No No   Sig: Take 1 Tab by mouth every day.   clopidogrel (PLAVIX) 75 MG Tab 6/6/2020 at [facility] Patient No No   Sig: Take 1 Tab by mouth every day.   lisinopril (PRINIVIL) 5 MG Tab 6/6/2020 at [facility] Patient No No   Sig: Take 1 Tab by mouth every day.   metoprolol SR (TOPROL XL) 50 MG TABLET SR 24 HR 6/6/2020 at [facility] Patient No No   Sig: Take 1 Tab by mouth every day.   nitroglycerin (NITROSTAT) 0.4 MG SL Tab K at [facility] Patient Yes No   Sig: Place 0.4 mg under tongue every 5 minutes as needed for Chest Pain.      Facility-Administered Medications: None       Physical Exam  Temp:  [36.1 °C (96.9 °F)-36.1 °C (97 °F)] 36.1 °C (97 °F)  Pulse:  [57-79] 67  Resp:  [14-18] 18  BP: (105-127)/(54-59) 127/59  SpO2:  [96 %-98 %] 96 %    Physical Exam  Vitals signs and nursing note reviewed. Exam conducted with a chaperone present.   Constitutional:       Appearance: Normal appearance. He is well-developed and normal weight.   HENT:      Head: Normocephalic and atraumatic.      Right Ear: External ear normal.      Left Ear: External ear normal.      Nose: Nose normal.      Mouth/Throat:      Mouth: Mucous membranes are moist.      Dentition: Abnormal dentition. Does not have dentures. Dental tenderness, gingival swelling and dental caries present. No dental abscesses or gum lesions.      Pharynx: Oropharynx is clear.   Eyes:      Extraocular Movements: Extraocular movements intact.      Conjunctiva/sclera: Conjunctivae normal.      Pupils: Pupils are equal, round, and reactive to light.   Neck:      Musculoskeletal: Normal range of motion and neck supple.      Thyroid: No thyromegaly.      Vascular: No JVD.   Cardiovascular:      Rate and Rhythm: Normal rate and regular rhythm.      Pulses: Normal pulses.      Heart sounds: Murmur present.   Pulmonary:      Effort: Pulmonary effort  is normal.      Breath sounds: Examination of the right-upper field reveals decreased breath sounds and wheezing. Examination of the left-upper field reveals decreased breath sounds and wheezing. Examination of the right-middle field reveals decreased breath sounds and wheezing. Examination of the left-middle field reveals decreased breath sounds and wheezing. Examination of the right-lower field reveals decreased breath sounds and wheezing. Examination of the left-lower field reveals decreased breath sounds and wheezing. Decreased breath sounds and wheezing present.   Chest:      Chest wall: No tenderness.   Abdominal:      General: Abdomen is flat. Bowel sounds are normal. There is no distension.      Palpations: Abdomen is soft. There is no mass.      Tenderness: There is no abdominal tenderness. There is no guarding or rebound.   Musculoskeletal: Normal range of motion.         General: Tenderness present.   Lymphadenopathy:      Cervical: No cervical adenopathy.   Skin:     General: Skin is warm and dry.      Capillary Refill: Capillary refill takes 2 to 3 seconds.      Findings: Erythema present. No rash.   Neurological:      General: No focal deficit present.      Mental Status: He is alert and oriented to person, place, and time. Mental status is at baseline.      Cranial Nerves: No cranial nerve deficit.      Deep Tendon Reflexes: Reflexes are normal and symmetric.   Psychiatric:         Mood and Affect: Mood normal.         Behavior: Behavior normal.         Thought Content: Thought content normal.         Judgment: Judgment normal.         Laboratory:  Recent Labs     06/06/20  1151   WBC 12.0*   RBC 4.23*   HEMOGLOBIN 14.4   HEMATOCRIT 42.8   .2*   MCH 34.0*   MCHC 33.6*   RDW 49.3   PLATELETCT 229   MPV 9.7     Recent Labs     06/06/20  1151   SODIUM 134*   POTASSIUM 4.7   CHLORIDE 105   CO2 20   GLUCOSE 156*   BUN 23*   CREATININE 1.53*   CALCIUM 9.2     Recent Labs     06/06/20  1151   ALTSGPT  13   ASTSGOT 12   ALKPHOSPHAT 69   TBILIRUBIN 0.3   GLUCOSE 156*         No results for input(s): NTPROBNP in the last 72 hours.      Recent Labs     06/06/20  1151   TROPONINT 15       Imaging:  DX-CHEST-PORTABLE (1 VIEW)   Final Result      1.  Mild chronic interstitial changes.   2.  No acute or new abnormality.      EC-ECHOCARDIOGRAM COMPLETE W/ CONT    (Results Pending)         Assessment/Plan:    * Near syncope  Assessment & Plan  Patient today was walking around on the street and suddenly he felt very lightheaded and started to get some shortness of breath.  He says last time he had any kind of an event like this was right before his heart attack about 2 years ago.  The patient does decided come to the emergency room for evaluation.  Patient will be value with orthostatic blood pressures, echocardiogram, a.m. cortisol level, thyroid functions as well as hemoglobin A1c and Accu-Cheks  The patient's troponin levels will be monitored.,  EKG was reviewed by myself which shows a sinus rhythm with a chronic right bundle branch block.  If necessary fluid resuscitation will be given for the patient.    Essential hypertension- (present on admission)  Assessment & Plan  Optimize blood pressure management to continue systolic blood pressure under 140 diastolic under 90.  Patient currently on lisinopril 5 mg daily  Metoprolol XL 50 mg daily    Hyperglycemia- (present on admission)  Assessment & Plan  Patient has mild hyperglycemia check hemoglobin A1c.    Coronary artery disease involving native coronary artery of native heart without angina pectoris- (present on admission)  Assessment & Plan  Patient does have established coronary artery disease.  At this point I am going to continue on metoprolol, Aspirin, lisinopril and Lipitor.  Currently patient does not have any chest pain.  He says he did not have chest pain prior to his heart attack as well.  He does have three-vessel bypass surgery 2 years ago.  If the patient  continues to be lightheaded and near syncopal the patient may benefit from a cardiac stress test as well.    COPD (chronic obstructive pulmonary disease) ([facility])  Assessment & Plan  Patient does have chronic COPD as he smokes 1 pack/day.  He has been educated on smoking cessation but so far has not quit.  Patient will be placed on RT protocol  He does use oxygen as an outpatient nocturnally only.  He will be provided oxygen as needed to keep oxygen saturations above 90%.    Hyperlipidemia- (present on admission)  Assessment & Plan  Continue with low-fat low-cholesterol diet  Continue with Lipitor 40 mg p.o. nightly  Follow-up on fasting lipid panel.    Tobacco abuse- (present on admission)  Assessment & Plan  -nicotine replacement protocol and cessation education provided for 12 minutes, discussed options of nicotine patch, acupuncture, medical treatment with wellbutrin and chantix. Discussed other options. Code 57786

## 2020-06-06 NOTE — ASSESSMENT & PLAN NOTE
-nicotine replacement protocol and cessation education provided for 12 minutes, discussed options of nicotine patch, acupuncture, medical treatment with wellbutrin and chantix. Discussed other options. Code 64867

## 2020-06-06 NOTE — ASSESSMENT & PLAN NOTE
Patient today was walking around on the street and suddenly he felt very lightheaded and started to get some shortness of breath.  He says last time he had any kind of an event like this was right before his heart attack about 2 years ago.  The patient does decided come to the emergency room for evaluation.  Patient will be value with orthostatic blood pressures, echocardiogram, a.m. cortisol level, thyroid functions as well as hemoglobin A1c and Accu-Cheks  The patient's troponin levels will be monitored.,  EKG was reviewed by myself which shows a sinus rhythm with a chronic right bundle branch block.  If necessary fluid resuscitation will be given for the patient.

## 2020-06-06 NOTE — PROGRESS NOTES
Triage officer note     D/W Dr Duran     74M, PMHx CAD, COPD, HTN, DLD, & CKD     Comes with near syncope     Reportedly no focal signs on exam, but appears dehydration per EDP exam     OBS telemetry admission with cardiac monitoring.  Admitting hospitalist is Dr Donovan

## 2020-06-06 NOTE — ASSESSMENT & PLAN NOTE
Optimize blood pressure management to continue systolic blood pressure under 140 diastolic under 90.  Patient currently on lisinopril 5 mg daily  Metoprolol XL 50 mg daily

## 2020-06-07 ENCOUNTER — APPOINTMENT (OUTPATIENT)
Dept: CARDIOLOGY | Facility: MEDICAL CENTER | Age: 75
End: 2020-06-07
Attending: HOSPITALIST
Payer: MEDICARE

## 2020-06-07 VITALS
SYSTOLIC BLOOD PRESSURE: 118 MMHG | HEIGHT: 67 IN | OXYGEN SATURATION: 98 % | BODY MASS INDEX: 26.06 KG/M2 | HEART RATE: 63 BPM | DIASTOLIC BLOOD PRESSURE: 56 MMHG | RESPIRATION RATE: 18 BRPM | TEMPERATURE: 97.6 F | WEIGHT: 166.01 LBS

## 2020-06-07 LAB
CHOLEST SERPL-MCNC: 101 MG/DL (ref 100–199)
CORTIS SERPL-MCNC: 5 UG/DL (ref 0–23)
HDLC SERPL-MCNC: 35 MG/DL
LDLC SERPL CALC-MCNC: 56 MG/DL
LV EJECT FRACT  99904: 70
LV EJECT FRACT MOD 2C 99903: 69.58
LV EJECT FRACT MOD 4C 99902: 73.08
LV EJECT FRACT MOD BP 99901: 71.21
TRIGL SERPL-MCNC: 50 MG/DL (ref 0–149)
TROPONIN T SERPL-MCNC: 17 NG/L (ref 6–19)

## 2020-06-07 PROCEDURE — 93306 TTE W/DOPPLER COMPLETE: CPT | Mod: 26 | Performed by: INTERNAL MEDICINE

## 2020-06-07 PROCEDURE — 93306 TTE W/DOPPLER COMPLETE: CPT

## 2020-06-07 PROCEDURE — 82533 TOTAL CORTISOL: CPT

## 2020-06-07 PROCEDURE — G0378 HOSPITAL OBSERVATION PER HR: HCPCS

## 2020-06-07 PROCEDURE — A9270 NON-COVERED ITEM OR SERVICE: HCPCS | Performed by: HOSPITALIST

## 2020-06-07 PROCEDURE — 99217 PR OBSERVATION CARE DISCHARGE: CPT | Performed by: HOSPITALIST

## 2020-06-07 PROCEDURE — 700102 HCHG RX REV CODE 250 W/ 637 OVERRIDE(OP): Performed by: HOSPITALIST

## 2020-06-07 PROCEDURE — 80061 LIPID PANEL: CPT

## 2020-06-07 RX ADMIN — ASPIRIN 81 MG: 81 TABLET, COATED ORAL at 05:58

## 2020-06-07 RX ADMIN — CLOPIDOGREL BISULFATE 75 MG: 75 TABLET ORAL at 05:58

## 2020-06-07 ASSESSMENT — PATIENT HEALTH QUESTIONNAIRE - PHQ9
2. FEELING DOWN, DEPRESSED, IRRITABLE, OR HOPELESS: NOT AT ALL
SUM OF ALL RESPONSES TO PHQ9 QUESTIONS 1 AND 2: 0
1. LITTLE INTEREST OR PLEASURE IN DOING THINGS: NOT AT ALL

## 2020-06-07 NOTE — PROGRESS NOTES
"Pt seen ambulating out of unit. Pt stopped and escorted back to room. Pt states he wants to leave now. \"I'm tired of waiting\". Pt asked to wait for Echo results. Dr. Donovan made aware of pt wanting to leave AMA  Pt refusing to wait to speak to MD and wait for echo results. IV taken out.     Fredy Gonsalez Jr. patient has chosen to leave the hospital against medical advice. The attending physician has not discharged the patient. Patient is not a risk to himself or others. I have discussed with the patient the following:  Physician has not determined patient is ready for discharge, Risks and consequences of leaving the hospital too soon and Benefit of continued hospitalization.      Discharge against medical advice form has been Patient refused to sign.       Attending physician has been notified.       "

## 2020-06-07 NOTE — DISCHARGE SUMMARY
Discharge Summary    CHIEF COMPLAINT ON ADMISSION  Chief Complaint   Patient presents with   • Weakness   • Near Syncopal       Reason for Admission  Weakness     Admission Date  6/6/2020    CODE STATUS  Full Code    HPI & HOSPITAL COURSE  This is a 74 y.o. male here with lightheadedness and near syncope.  The patient was apparently walking on the street when he suddenly became lightheaded.  This happened to him about 2 years ago.  He was concerned because at that point in time he had a heart attack.  Patient is came in to be evaluated.  He does have dehydration and thus he was given hydration.  The patient then was undergoing a work-up including an echocardiogram.  Results of the work-up at this point are still pending.  The patient at this point did not wish to wait for the results and left AGAINST MEDICAL ADVICE.  The patient suddenly walked out and thus no further instructions were able to be provided for the patient.  We did try to talk to him and persuade him from not leaving but he refused to do that.       Therefore, he is discharged in good and stable condition against medcial advice.    The patient recovered much more quickly than anticipated on admission.    Discharge Date  6/7/2020    FOLLOW UP ITEMS POST DISCHARGE  Follow-up with the primary care physician 7 to 10 days    DISCHARGE DIAGNOSES  Principal Problem:    Near syncope POA: Unknown  Active Problems:    Hyperlipidemia POA: Yes    COPD (chronic obstructive pulmonary disease) (HCC) POA: Unknown      Overview: Moderate obstructive lung disease    Coronary artery disease involving native coronary artery of native heart without angina pectoris POA: Yes    Hyperglycemia POA: Yes    Essential hypertension POA: Yes    Tobacco abuse POA: Yes  Resolved Problems:    * No resolved hospital problems. *      FOLLOW UP  Future Appointments   Date Time Provider Department Center   6/25/2020  7:20 AM Robert Lindo M.D. [facility]   7/8/2020  2:20 PM  Brendon Tong M.D. [facility] None     No follow-up provider specified.    MEDICATIONS ON DISCHARGE     Medication List      ASK your doctor about these medications      Instructions   albuterol 108 (90 Base) MCG/ACT Aers inhalation aerosol   Doctor's comments:  Disp as Ventolin  Inhale 2 Puffs by mouth every 6 hours as needed for Shortness of Breath.  Dose:  2 Puff     aspirin EC 81 MG Tbec  Commonly known as:  ECOTRIN   Take 81 mg by mouth every day.  Dose:  81 mg     atorvastatin 40 MG Tabs  Commonly known as:  LIPITOR   Take 1 Tab by mouth every day.  Dose:  40 mg     clopidogrel 75 MG Tabs  Commonly known as:  PLAVIX   Take 1 Tab by mouth every day.  Dose:  75 mg     HYDROcodone-acetaminophen 5-325 MG Tabs per tablet  Commonly known as:  NORCO   Take 1 Tab by mouth every 8 hours as needed for up to 30 days. Prescription must last 30 days.  Dose:  1 Tab     lisinopril 5 MG Tabs  Commonly known as:  PRINIVIL   Take 1 Tab by mouth every day.  Dose:  5 mg     metoprolol SR 50 MG Tb24  Commonly known as:  TOPROL XL   Take 1 Tab by mouth every day.  Dose:  50 mg     nitroglycerin 0.4 MG Subl  Commonly known as:  NITROSTAT   Place 0.4 mg under tongue every 5 minutes as needed for Chest Pain.  Dose:  0.4 mg            Allergies  Allergies   Allergen Reactions   • Chantix [Varenicline] Nausea     Made patient feel sick.   • Fish Vomiting       DIET  Orders Placed This Encounter   Procedures   • Diet Order Regular     Standing Status:   Standing     Number of Occurrences:   1     Order Specific Question:   Diet:     Answer:   Regular [1]       ACTIVITY  As tolerated.  Weight bearing as tolerated    CONSULTATIONS  None    PROCEDURES  None    LABORATORY  Lab Results   Component Value Date    SODIUM 134 (L) 06/06/2020    POTASSIUM 4.7 06/06/2020    CHLORIDE 105 06/06/2020    CO2 20 06/06/2020    GLUCOSE 156 (H) 06/06/2020    BUN 23 (H) 06/06/2020    CREATININE 1.53 (H) 06/06/2020        Lab Results   Component Value Date     WBC 12.0 (H) 06/06/2020    HEMOGLOBIN 14.4 06/06/2020    HEMATOCRIT 42.8 06/06/2020    PLATELETCT 229 06/06/2020        Total time of the discharge process exceeds 35 minutes.

## 2020-06-17 ENCOUNTER — OFFICE VISIT (OUTPATIENT)
Dept: PULMONOLOGY | Facility: HOSPICE | Age: 75
End: 2020-06-17
Payer: MEDICARE

## 2020-06-17 VITALS
BODY MASS INDEX: 26.68 KG/M2 | WEIGHT: 170 LBS | RESPIRATION RATE: 16 BRPM | HEIGHT: 67 IN | DIASTOLIC BLOOD PRESSURE: 60 MMHG | HEART RATE: 74 BPM | OXYGEN SATURATION: 95 % | SYSTOLIC BLOOD PRESSURE: 108 MMHG

## 2020-06-17 DIAGNOSIS — Z72.0 TOBACCO ABUSE: ICD-10-CM

## 2020-06-17 DIAGNOSIS — I10 ESSENTIAL HYPERTENSION: ICD-10-CM

## 2020-06-17 DIAGNOSIS — J43.1 PANLOBULAR EMPHYSEMA (HCC): ICD-10-CM

## 2020-06-17 DIAGNOSIS — G47.34 NOCTURNAL HYPOXIA: ICD-10-CM

## 2020-06-17 DIAGNOSIS — Z87.891 PERSONAL HISTORY OF NICOTINE DEPENDENCE: ICD-10-CM

## 2020-06-17 DIAGNOSIS — Z09 HOSPITAL DISCHARGE FOLLOW-UP: ICD-10-CM

## 2020-06-17 PROCEDURE — 99214 OFFICE O/P EST MOD 30 MIN: CPT | Mod: 25 | Performed by: NURSE PRACTITIONER

## 2020-06-17 PROCEDURE — 94761 N-INVAS EAR/PLS OXIMETRY MLT: CPT | Performed by: NURSE PRACTITIONER

## 2020-06-17 ASSESSMENT — FIBROSIS 4 INDEX: FIB4 SCORE: 1.05

## 2020-06-17 NOTE — PROGRESS NOTES
Chief Complaint   Patient presents with   • Follow-Up     Last Seen 10/8/19        HPI:  Fredy Gonsalez Jr. is a 74 y.o. year old male here today for follow-up on COPD and chronic respiratory failure.     Since last OV he has been hospitalized 3x's and two of those for AECOPD requiring abx/steroid use and re-initiation of inhaler therapy. Last AECOPD 1/13/20.     He is a current everyday smoker, 60-pack-year history, adamantly declines smoking cessation.  He is aware of the risks.  Fredy has tried multiple interventions for smoking cessation including Chantix, hypnosis, nicotine patches and gum without benefit.     CT in September 2018 which indicates Patchy nonspecific pulmonary fibrotic changes again seen and similar to prior exam (2016) with emphysematous changes also noted. No suspicious pulmonary nodule or mass.  Stable left adrenal gland.  Probable bilateral kidney cyst.    He is due for updated CT scan. Recent CXR 6/6/20 noted mild interstitial changes but no acute process.     PFTs 4/16/19 show moderate obstructive lung disease with an FEV1 1.98 L 67% predicted, FEV1 FVC ratio 55, DLCO 76% predicted.  DLCO was normal at 106% predicted.  FEV1 improved to 2.37 L 80% predicted after albuterol.   Cost is an issue for patient to remain on inhaler therapy. He currently using albuterol 3x's daily. He walks most places but does have to pace himself and stop/sit down. He notes occasional dry cough and dyspnea with walking/exertion. He is mainly using oxygen at night.  He was previously on Trelegy inhaler.     He has a history of AMIRA intolerant to CPAP.   He uses 2 L of oxygen nocturnally.  He will continue to benefit from nocturnal O2 therapy.     He has a history of coronary artery disease S/P stent placement, managed by Dr. Tong.      ROS: As per HPI and otherwise negative if not stated.    Past Medical History:   Diagnosis Date   • Arthritis    • COPD    • EMPHYSEMA    • Essential hypertension  12/2/2019   • Heart attack (HCC) 12/02/2017    received 3 stents, [facility]   • Hyperlipidemia 4/17/2012   • Pain in joint, multiple sites        Past Surgical History:   Procedure Laterality Date   • STENT PLACEMENT  12/02/2017    left main to proximal LAD, mid LAD, ostial left main   • OPEN REDUCTION      left arm. 32 yrs ago       Family History   Problem Relation Age of Onset   • Arthritis Mother    • Lung Disease Mother    • Osteoporosis Mother    • Alzheimer's Disease Mother    • Other Mother         parkinsons   • Kidney Disease Father    • Cancer Neg Hx    • Diabetes Neg Hx    • Heart Disease Neg Hx    • Stroke Neg Hx        Social History     Socioeconomic History   • Marital status: Single     Spouse name: Not on file   • Number of children: Not on file   • Years of education: Not on file   • Highest education level: Not on file   Occupational History   • Not on file   Social Needs   • Financial resource strain: Not on file   • Food insecurity     Worry: Never true     Inability: Never true   • Transportation needs     Medical: No     Non-medical: No   Tobacco Use   • Smoking status: Current Every Day Smoker     Packs/day: 1.00     Years: 60.00     Pack years: 60.00     Types: Cigarettes     Start date: 6/15/1957   • Smokeless tobacco: Former User     Types: Chew     Quit date: 1957   Substance and Sexual Activity   • Alcohol use: No     Alcohol/week: 0.0 oz     Comment: quit 28yrs ago   • Drug use: No     Types: Marijuana     Comment: quit in 1969, THC Hash    • Sexual activity: Never     Partners: Female     Birth control/protection: Condom   Lifestyle   • Physical activity     Days per week: Not on file     Minutes per session: Not on file   • Stress: Not on file   Relationships   • Social connections     Talks on phone: Not on file     Gets together: Not on file     Attends Orthodoxy service: Not on file     Active member of club or organization: Not on file     Attends meetings of clubs or  organizations: Not on file     Relationship status: Not on file   • Intimate partner violence     Fear of current or ex partner: Not on file     Emotionally abused: Not on file     Physically abused: Not on file     Forced sexual activity: Not on file   Other Topics Concern   • Not on file   Social History Narrative   • Not on file       Allergies as of 06/17/2020 - Reviewed 06/17/2020   Allergen Reaction Noted   • Chantix [varenicline] Nausea 10/09/2018   • Fish Vomiting 11/11/2011        Vitals:  @Vital signs for this encounter:    Current medications as of today   Current Outpatient Medications   Medication Sig Dispense Refill   • clopidogrel (PLAVIX) 75 MG Tab Take 1 Tab by mouth every day. 30 Tab 11   • atorvastatin (LIPITOR) 40 MG Tab Take 1 Tab by mouth every day. 30 Tab 11   • lisinopril (PRINIVIL) 5 MG Tab Take 1 Tab by mouth every day. 30 Tab 11   • metoprolol SR (TOPROL XL) 50 MG TABLET SR 24 HR Take 1 Tab by mouth every day. 30 Tab 11   • albuterol 108 (90 Base) MCG/ACT Aero Soln inhalation aerosol Inhale 2 Puffs by mouth every 6 hours as needed for Shortness of Breath. 8.5 g 11   • HYDROcodone-acetaminophen (NORCO) 5-325 MG Tab per tablet Take 1 Tab by mouth every 8 hours as needed for up to 30 days. Prescription must last 30 days. 90 Tab 0   • nitroglycerin (NITROSTAT) 0.4 MG SL Tab Place 0.4 mg under tongue every 5 minutes as needed for Chest Pain.     • aspirin EC (ECOTRIN) 81 MG Tablet Delayed Response Take 81 mg by mouth every day.       No current facility-administered medications for this visit.          Physical Exam:   Gen:           Alert and oriented, No apparent distress. Mood and affect appropriate, normal interaction with examiner.  Eyes:          PERRL, EOM intact, sclere white, conjunctive moist.  Ears:          Not examined.   Hearing:     Grossly intact.  Nose:          Normal, no lesions or deformities.  Dentition:    Good dentition.  Oropharynx:   mask  Mallampati Classification:  mask  Neck:        Supple, trachea midline, no masses.  Respiratory Effort: No intercostal retractions or use of accessory muscles.   Lung Auscultation:      Clear to auscultation bilaterally but diminished t/o; no rales, rhonchi or wheezing.  CV:            Regular rate and rhythm. No murmurs, rubs or gallops.  Abd:           Not examined. .  Lymphadenopathy: Not examined.  Gait and Station: Normal.  Digits and Nails: No clubbing, cyanosis, petechiae, or nodes.   Cranial Nerves: II-XII grossly intact.  Skin:        No rashes, lesions or ulcers noted.               Ext:           No cyanosis or edema.      Assessment:  1. Hospital discharge follow-up     2. Panlobular emphysema (HCC)     3. Essential hypertension     4. Tobacco abuse     5. BMI 26.0-26.9,adult         Immunizations:    Flu:11/2019  Pneumovax 23:12/2013  Prevnar 13:9/2015    Plan:  1.  Patient COPD is poorly managed due to cost issues with inhalers.  We will initiate therapy with Trelegy 1 puff daily and sample given.  Rx with coupon also given to patient for 1 month free sample and to check cost for continued use.  Strongly encouraged him to remain on maintenance inhaler.  Assistance paperwork for [employer] also provided.  Continue albuterol HFA inhaler as needed.  Reviewed proper use.  2.  Continue regular walking for improved conditioning.  3.  Strongly encourage smoking cessation.  4.  Referral to lung cancer screening program for CT scan of chest now.  5.  Multi-ox in office today indicated normal saturations on exertion.  6.  Continue oxygen therapy nocturnally.  Patient continue to benefit from therapy.  7.  Follow-up in 3 months to check symptoms, sooner if needed.  Consider updating PFT.    Please note that this dictation was created using voice recognition software. I have made every reasonable attempt to correct obvious errors, but it is possible there are errors of grammar and possibly content that I did not discover before finalizing the  note.

## 2020-06-25 ENCOUNTER — OFFICE VISIT (OUTPATIENT)
Dept: MEDICAL GROUP | Facility: MEDICAL CENTER | Age: 75
End: 2020-06-25
Attending: FAMILY MEDICINE
Payer: MEDICARE

## 2020-06-25 VITALS
HEART RATE: 86 BPM | TEMPERATURE: 97.7 F | BODY MASS INDEX: 27.15 KG/M2 | HEIGHT: 67 IN | DIASTOLIC BLOOD PRESSURE: 58 MMHG | SYSTOLIC BLOOD PRESSURE: 102 MMHG | WEIGHT: 173 LBS | RESPIRATION RATE: 16 BRPM | OXYGEN SATURATION: 93 %

## 2020-06-25 DIAGNOSIS — M48.061 FORAMINAL STENOSIS OF LUMBAR REGION: ICD-10-CM

## 2020-06-25 DIAGNOSIS — J43.1 PANLOBULAR EMPHYSEMA (HCC): ICD-10-CM

## 2020-06-25 DIAGNOSIS — M19.90 ARTHRITIS: ICD-10-CM

## 2020-06-25 DIAGNOSIS — I35.1 NONRHEUMATIC AORTIC VALVE INSUFFICIENCY: ICD-10-CM

## 2020-06-25 PROCEDURE — 99213 OFFICE O/P EST LOW 20 MIN: CPT | Performed by: FAMILY MEDICINE

## 2020-06-25 RX ORDER — HYDROCODONE BITARTRATE AND ACETAMINOPHEN 5; 325 MG/1; MG/1
1 TABLET ORAL EVERY 8 HOURS PRN
Qty: 90 TAB | Refills: 0 | Status: SHIPPED | OUTPATIENT
Start: 2020-07-31 | End: 2020-09-24 | Stop reason: SDUPTHER

## 2020-06-25 RX ORDER — HYDROCODONE BITARTRATE AND ACETAMINOPHEN 5; 325 MG/1; MG/1
1 TABLET ORAL EVERY 8 HOURS PRN
Qty: 90 TAB | Refills: 0 | Status: SHIPPED | OUTPATIENT
Start: 2020-07-01 | End: 2020-06-25 | Stop reason: SDUPTHER

## 2020-06-25 RX ORDER — HYDROCODONE BITARTRATE AND ACETAMINOPHEN 5; 325 MG/1; MG/1
1 TABLET ORAL EVERY 8 HOURS PRN
Qty: 90 TAB | Refills: 0 | Status: SHIPPED | OUTPATIENT
Start: 2020-07-31 | End: 2020-06-25 | Stop reason: SDUPTHER

## 2020-06-25 ASSESSMENT — FIBROSIS 4 INDEX: FIB4 SCORE: 1.05

## 2020-06-25 NOTE — PROGRESS NOTES
"Subjective:      Ángel Gonsalez Jr. is a 74 y.o. male who presents with Follow-Up            HPI 1.  Polyarthritis/foraminal stenosis lumbar area-patient continues to take Norco 5/325 up to 3 times daily with partial pain control.  He is walking but has to stop about every 2 blocks due to knee pain thigh pain and back pain.  He denies any constipation.  2.  Aortic insufficiency-patient was hospitalized overnight earlier in June for an episode of extreme diaphoresis.  This was reminiscent to him of when he had an acute MI 2 years ago in [facility].  Initial evaluation did not reveal any acute coronary event.  Patient did leave AMA after 24 hours.  An updated echocardiogram however was obtained which showed mild to moderate aortic insufficiency which was rated as mild 1 year ago.  Patient did quit smoking for about 1 month is currently back at just under a pack per day.  Not reporting any palpitations or ankle edema    ROS positive for daily morning cough without daily sputum production.  No recent diaphoresis       Objective:     /58 (BP Location: Left arm, Patient Position: Sitting, BP Cuff Size: Adult)   Pulse 86   Temp 36.5 °C (97.7 °F) (Temporal)   Resp 16   Ht 1.702 m (5' 7.01\")   Wt 78.5 kg (173 lb)   SpO2 93%   BMI 27.09 kg/m²      Physical Exam  Gen.- alert, cooperative, in no acute distress  Neck- midline trachea, thyroid not enlarged or tender,supple, no cervical adenopathy  Chest-clear to auscultation and percussion with normal breath sounds. No retractions. Chest wall nontender  Cardiac- regular rhythm and rate. No murmur, thrill, or heave            Assessment/Plan:       1. Arthritis    - HYDROcodone-acetaminophen (NORCO) 5-325 MG Tab per tablet; Take 1 Tab by mouth every 8 hours as needed for up to 30 days. Prescription must last 30 days.  Dispense: 90 Tab; Refill: 0    2. Foraminal stenosis of lumbar region    - HYDROcodone-acetaminophen (NORCO) 5-325 MG Tab per tablet; Take 1 " Tab by mouth every 8 hours as needed for up to 30 days. Prescription must last 30 days.  Dispense: 90 Tab; Refill: 0    3. Nonrheumatic aortic valve insufficiency      4. Panlobular emphysema (HCC)    Plan: 1.  Patient has upcoming cardiology follow-up next month-he has been provided a copy of his echocardiogram and will review the aortic valve insufficiency issue with Dr. Montana  2.  Patient strongly encouraged to again reduce or quit tobacco  3.  Follow-up with me in 3 months or sooner as needed

## 2020-08-03 ENCOUNTER — TELEPHONE (OUTPATIENT)
Dept: PULMONOLOGY | Facility: HOSPICE | Age: 75
End: 2020-08-03

## 2020-08-03 NOTE — TELEPHONE ENCOUNTER
MEDICATION PRIOR AUTHORIZATION NEEDED:    1. Name of Medication: Trelegy Ellipta 100/62.5/25 mcg    2. Requested By (Name of Pharmacy): [facility]     3. Is insurance on file current? yes    4. What is the name & phone number of the 3rd party payor? [employer] [phone]

## 2020-08-04 NOTE — TELEPHONE ENCOUNTER
DOCUMENTATION OF PRIOR AUTH STATUS    1. Medication name and dose: Trelegy Ellipta 100/62.5/25 mcg    2. Name and Phone # of Prescription coverage company: [employer] [phone]    3. Date Prior Auth was submitted: 8/3/2020    4. What information was given to obtain insurance decision: Clinical notes    5. Prior Auth letter Approved or Denied: Denied    6. Pharmacy notified: No    7. Patient notified: No      Trelegy Ellipta 100/62.5/25 mcg was denied.  The patient needs to try three of the following:  Anoro Ellipta, Bevespi, Breo Ellipta, Serevent and Stiolto Respimat.  Dx is COPD.  Please advise, thank you.

## 2020-08-11 ENCOUNTER — PATIENT OUTREACH (OUTPATIENT)
Dept: HEALTH INFORMATION MANAGEMENT | Facility: OTHER | Age: 75
End: 2020-08-11

## 2020-08-13 ENCOUNTER — OFFICE VISIT (OUTPATIENT)
Dept: CARDIOLOGY | Facility: MEDICAL CENTER | Age: 75
End: 2020-08-13
Payer: MEDICARE

## 2020-08-13 VITALS
WEIGHT: 176 LBS | RESPIRATION RATE: 16 BRPM | HEART RATE: 82 BPM | DIASTOLIC BLOOD PRESSURE: 70 MMHG | SYSTOLIC BLOOD PRESSURE: 126 MMHG | OXYGEN SATURATION: 96 % | HEIGHT: 67 IN | BODY MASS INDEX: 27.62 KG/M2

## 2020-08-13 DIAGNOSIS — I10 ESSENTIAL HYPERTENSION: ICD-10-CM

## 2020-08-13 DIAGNOSIS — Z95.5 S/P CORONARY ARTERY STENT PLACEMENT: ICD-10-CM

## 2020-08-13 DIAGNOSIS — E78.5 DYSLIPIDEMIA: ICD-10-CM

## 2020-08-13 DIAGNOSIS — I25.10 CORONARY ARTERY DISEASE INVOLVING NATIVE CORONARY ARTERY OF NATIVE HEART WITHOUT ANGINA PECTORIS: ICD-10-CM

## 2020-08-13 DIAGNOSIS — I25.10 CORONARY ARTERY DISEASE, OCCLUSIVE: ICD-10-CM

## 2020-08-13 DIAGNOSIS — Z95.1 S/P CABG (CORONARY ARTERY BYPASS GRAFT): ICD-10-CM

## 2020-08-13 PROCEDURE — 99213 OFFICE O/P EST LOW 20 MIN: CPT | Performed by: NURSE PRACTITIONER

## 2020-08-13 ASSESSMENT — ENCOUNTER SYMPTOMS
HEMOPTYSIS: 0
VOMITING: 0
ORTHOPNEA: 0
PALPITATIONS: 0
COUGH: 0
SHORTNESS OF BREATH: 1
CLAUDICATION: 0
WHEEZING: 0
CHILLS: 0
HEADACHES: 0
PND: 0
DIZZINESS: 0
FEVER: 0
NAUSEA: 0
SPUTUM PRODUCTION: 0

## 2020-08-13 ASSESSMENT — FIBROSIS 4 INDEX: FIB4 SCORE: 1.05

## 2020-08-13 NOTE — PROGRESS NOTES
No chief complaint on file.      Subjective:   Ángel Gonsalez Jr. is a 74 y.o. male who presents today for follow-up evaluation of CAD, CABG, coronary intervention, myocardial infarction and hyperlipidemia.  Patient is followed by Dr. Tong.     Last seen on 10/7/2019.     Since 10/7/2019 the patient was hospitalized for lightheadedness and near syncope 6/6/2020.  He was concerned that it may be something very serious as this is how he felt 2 years prior when he had a myocardial infarction.  He was found to be dehydrated.  Echocardiogram was performed and compared to echo of 4/30/2019 indicating EF 70%, mild to moderate aortic insufficiency.  Patient left AMA prior to results of his echocardiogram.  The patient reports doing quite well at this time.  He has shortness of breath that that is associated with his known COPD.  He has no other complaints.  Recent lipid panel indicates , TG 50, HDL 35, LDL 56.  He is compliant with ASA 81 mg atorvastatin 40 mg every evening, clopidogrel 75 mg daily, lisinopril 5 mg daily, and metoprolol SR 50 mg daily.     Since 4/11/2019 was recently hospitalized at [facility] which sharp chest pain.  Admitted to observation but got frustrated and left.  Troponin levels were normal.  EKG showed no acute changes.  His symptoms are nonexertional sharp transient.  The patient resides at [facility] located 3 or 4 blocks from the cardiology clinic.  He walks everywhere but is slow due to his claudication.  No angina.     Since 10/9/2018 appointment the patient has some substernal nonradiating chest pain with or without exertion with no other associated symptoms.  Continues to smoke cigarettes.  States that Chantix made him sick and he is tried stop with hypnosis x2.  Has bilateral hip pain with walking relieved with rest.  Chronically short of breath.  Medical records were requested but not available.     Past medical history  According to the patient he was  "hospitalized at [facility] in [place], [place] where he was hospitalized in  for a \"heart attack\".  The patient had both coronary intervention of the left main ostium, left main, LAD and mid LAD with 3 separate stents and CABG the sequence of which the patient cannot explain and there are no medical records available.     The patient states that he is compliant with his medications.  The patient continues to smoke a pack of cigarettes a day and is done so for \"60 years\".     The patient reports having nonexertional chest pains.  The patient reports severe bilateral hip pain with walking relieved with rest.     Family history  No heart disease.  Father  age 32 of kidney disease.    Past Medical History:   Diagnosis Date   • Arthritis    • COPD    • EMPHYSEMA    • Essential hypertension 2019   • Heart attack (HCC) 2017    received 3 stents, [facility]   • Hyperlipidemia 2012   • Pain in joint, multiple sites      Past Surgical History:   Procedure Laterality Date   • STENT PLACEMENT  2017    left main to proximal LAD, mid LAD, ostial left main   • OPEN REDUCTION      left arm. 32 yrs ago     Family History   Problem Relation Age of Onset   • Arthritis Mother    • Lung Disease Mother    • Osteoporosis Mother    • Alzheimer's Disease Mother    • Other Mother         parkinsons   • Kidney Disease Father    • Cancer Neg Hx    • Diabetes Neg Hx    • Heart Disease Neg Hx    • Stroke Neg Hx      Social History     Socioeconomic History   • Marital status: Single     Spouse name: Not on file   • Number of children: Not on file   • Years of education: Not on file   • Highest education level: Not on file   Occupational History   • Not on file   Social Needs   • Financial resource strain: Not on file   • Food insecurity     Worry: Never true     Inability: Never true   • Transportation needs     Medical: No     Non-medical: No   Tobacco Use   • Smoking status: " Current Every Day Smoker     Packs/day: 1.00     Years: 60.00     Pack years: 60.00     Types: Cigarettes     Start date: 6/15/1957   • Smokeless tobacco: Former User     Types: Chew     Quit date: 1957   Substance and Sexual Activity   • Alcohol use: No     Alcohol/week: 0.0 oz     Comment: quit 28yrs ago   • Drug use: No     Types: Marijuana     Comment: quit in 1969, THC Hash    • Sexual activity: Never     Partners: Female     Birth control/protection: Condom   Lifestyle   • Physical activity     Days per week: Not on file     Minutes per session: Not on file   • Stress: Not on file   Relationships   • Social connections     Talks on phone: Not on file     Gets together: Not on file     Attends Druze service: Not on file     Active member of club or organization: Not on file     Attends meetings of clubs or organizations: Not on file     Relationship status: Not on file   • Intimate partner violence     Fear of current or ex partner: Not on file     Emotionally abused: Not on file     Physically abused: Not on file     Forced sexual activity: Not on file   Other Topics Concern   • Not on file   Social History Narrative   • Not on file     Allergies   Allergen Reactions   • Chantix [Varenicline] Nausea     Made patient feel sick.   • Fish Vomiting     Outpatient Encounter Medications as of 8/13/2020   Medication Sig Dispense Refill   • umeclidinium-vilanterol (ANORO ELLIPTA) 62.5-25 MCG/INH AEROSOL POWDER, BREATH ACTIVATED inhaler Inhale 1 Puff by mouth every day. 1 Each 11   • HYDROcodone-acetaminophen (NORCO) 5-325 MG Tab per tablet Take 1 Tab by mouth every 8 hours as needed for up to 30 days. Prescription must last 30 days. 90 Tab 0   • clopidogrel (PLAVIX) 75 MG Tab Take 1 Tab by mouth every day. 30 Tab 11   • atorvastatin (LIPITOR) 40 MG Tab Take 1 Tab by mouth every day. 30 Tab 11   • lisinopril (PRINIVIL) 5 MG Tab Take 1 Tab by mouth every day. 30 Tab 11   • metoprolol SR (TOPROL XL) 50 MG TABLET SR  24 HR Take 1 Tab by mouth every day. 30 Tab 11   • albuterol 108 (90 Base) MCG/ACT Aero Soln inhalation aerosol Inhale 2 Puffs by mouth every 6 hours as needed for Shortness of Breath. 8.5 g 11   • nitroglycerin (NITROSTAT) 0.4 MG SL Tab Place 0.4 mg under tongue every 5 minutes as needed for Chest Pain.     • aspirin EC (ECOTRIN) 81 MG Tablet Delayed Response Take 81 mg by mouth every day.       No facility-administered encounter medications on file as of 8/13/2020.      Review of Systems   Constitutional: Negative for chills and fever.   Respiratory: Positive for shortness of breath. Negative for cough, hemoptysis, sputum production and wheezing.    Cardiovascular: Negative for chest pain, palpitations, orthopnea, claudication, leg swelling and PND.   Gastrointestinal: Negative for nausea and vomiting.   Neurological: Negative for dizziness and headaches.   All other systems reviewed and are negative.       Objective:   There were no vitals taken for this visit.    Physical Exam   Constitutional: He appears well-developed and well-nourished.   Eyes: EOM are normal.   Neck: Neck supple. No JVD present.   Cardiovascular: Normal rate, regular rhythm and normal heart sounds.   No murmur heard.  Pulmonary/Chest: Effort normal and breath sounds normal.   Abdominal: Soft.   Neurological:   CN II-XII grossly intact   Skin: Skin is warm and dry.   Psychiatric: He has a normal mood and affect. His behavior is normal. Judgment and thought content normal.   Nursing note and vitals reviewed.    ECHOCARDIOGRAM 04/30/2019  No prior study is available for comparison.   Normal left ventricular systolic function. Left ventricular ejection   fraction is visually estimated to be 55%.   Indeterminate diastolic function.     MPI 04/30/2019  Normal inferior vena cava size and inspiratory collapse.    * Small sized, moderate severity, partially reversible defect in the distal    to mid inferior wall. SDS of 2, SSS of 4.    * Normal left  ventricular size, ejection fraction, and wall motion.   ECG INTERPRETATION   Negative stress ECG for ischemia.      10/09/2018 EKG: Normal sinus rhythm, rate 61.  Inferior posterior myocardial infarction.  Reviewed by myself.    Assessment:     1. Coronary artery disease involving native coronary artery of native heart without angina pectoris     2. Coronary artery disease, occlusive     3. Dyslipidemia     4. S/P CABG (coronary artery bypass graft)     5. S/P coronary artery stent placement     6. Essential hypertension         Medical Decision Making:  Today's Assessment / Status / Plan:   No changes to his medications.  His CAD is stable, dyslipidemia stable, and HTN stable.  BP today was 126/70.    Collaborating MD is Dr. Harris.  Follow-up visit in 1 year with Dr Tong.    Please note that this dictation was created using voice recognition software.  I have made every reasonable attempt to correct obvious errors, but it is possible there are errors of grammar or possibly content that I did not discover before finalizing the note.

## 2020-09-17 ENCOUNTER — OFFICE VISIT (OUTPATIENT)
Dept: PULMONOLOGY | Facility: HOSPICE | Age: 75
End: 2020-09-17
Payer: MEDICARE

## 2020-09-17 VITALS
SYSTOLIC BLOOD PRESSURE: 130 MMHG | HEIGHT: 67 IN | WEIGHT: 175 LBS | HEART RATE: 86 BPM | RESPIRATION RATE: 16 BRPM | OXYGEN SATURATION: 95 % | BODY MASS INDEX: 27.47 KG/M2 | DIASTOLIC BLOOD PRESSURE: 60 MMHG

## 2020-09-17 DIAGNOSIS — F17.210 NICOTINE DEPENDENCE, CIGARETTES, UNCOMPLICATED: ICD-10-CM

## 2020-09-17 DIAGNOSIS — G47.33 OSA (OBSTRUCTIVE SLEEP APNEA): ICD-10-CM

## 2020-09-17 DIAGNOSIS — G47.34 NOCTURNAL HYPOXIA: Chronic | ICD-10-CM

## 2020-09-17 DIAGNOSIS — Z72.0 TOBACCO ABUSE: Chronic | ICD-10-CM

## 2020-09-17 DIAGNOSIS — F17.200 CURRENT SMOKER: ICD-10-CM

## 2020-09-17 DIAGNOSIS — J43.1 PANLOBULAR EMPHYSEMA (HCC): Chronic | ICD-10-CM

## 2020-09-17 PROCEDURE — 99214 OFFICE O/P EST MOD 30 MIN: CPT | Performed by: NURSE PRACTITIONER

## 2020-09-17 ASSESSMENT — FIBROSIS 4 INDEX: FIB4 SCORE: 1.05

## 2020-09-17 NOTE — PROGRESS NOTES
Chief Complaint   Patient presents with   • Follow-Up     Last Seen 6/17/2020        HPI:  Fredy Gonsalez Jr. is a 74 y.o. year old male here today for follow-up on COPD and chronic respiratory failure.     Last OV 6/17/20 and started on Trelegy 1 puff daily but insurance did not cover it. He was switched to Anoro and feels it works just as well. He uses YUE up to 3x's daily dependent on activity with benefit. He denies cold/URI's since last OV. He denies cough/phlegm/chest tightness or wheezing. He continues to smoke 1 pack daily and not motivated to quit due to inactivity.  Overall doing well but frustrated he is less active and not able to go to [facility] or walk regularly due to air quality. He has had issues scheduling LDCT.     He was hospitalized 3x's piror to 6/17/20 OV and two of those for AECOPD requiring abx/steroid use and re-initiation of inhaler therapy. Last AECOPD 1/13/20.     He is a current everyday smoker, 60-pack-year history, adamantly declines smoking cessation.  He is aware of the risks.  Fredy has tried multiple interventions for smoking cessation including Chantix, hypnosis, nicotine patches and gum without benefit.     CT in September 2018 which indicates Patchy nonspecific pulmonary fibrotic changes again seen and similar to prior exam (2016) with emphysematous changes also noted. No suspicious pulmonary nodule or mass.  Stable left adrenal gland.  Probable bilateral kidney cyst.    He is due for updated CT scan. Recent CXR 6/6/20 noted mild interstitial changes but no acute process.  LDCT not completed; will refer again.    PFTs 4/16/19 show moderate obstructive lung disease with an FEV1 1.98 L 67% predicted, FEV1 FVC ratio 55, DLCO 76% predicted.  DLCO was normal at 106% predicted.  FEV1 improved to 2.37 L 80% predicted after albuterol.   Cost is an issue for patient to remain on inhaler therapy.      He has a history of AMIRA intolerant to CPAP.   He uses 2 L of oxygen  nocturnally.  He will continue to benefit from nocturnal O2 therapy.     He has a history of coronary artery disease S/P stent placement, managed by Dr. Montana.    ROS: As per HPI and otherwise negative if not stated.    Past Medical History:   Diagnosis Date   • Arthritis    • COPD    • EMPHYSEMA    • Essential hypertension 12/2/2019   • Heart attack (HCC) 12/02/2017    received 3 stents, [facility]   • Hyperlipidemia 4/17/2012   • Pain in joint, multiple sites        Past Surgical History:   Procedure Laterality Date   • STENT PLACEMENT  12/02/2017    left main to proximal LAD, mid LAD, ostial left main   • OPEN REDUCTION      left arm. 32 yrs ago       Family History   Problem Relation Age of Onset   • Arthritis Mother    • Lung Disease Mother    • Osteoporosis Mother    • Alzheimer's Disease Mother    • Other Mother         parkinsons   • Kidney Disease Father    • Cancer Neg Hx    • Diabetes Neg Hx    • Heart Disease Neg Hx    • Stroke Neg Hx        Social History     Socioeconomic History   • Marital status: Single     Spouse name: Not on file   • Number of children: Not on file   • Years of education: Not on file   • Highest education level: Not on file   Occupational History   • Not on file   Social Needs   • Financial resource strain: Not on file   • Food insecurity     Worry: Never true     Inability: Never true   • Transportation needs     Medical: No     Non-medical: No   Tobacco Use   • Smoking status: Current Every Day Smoker     Packs/day: 1.50     Years: 60.00     Pack years: 90.00     Types: Cigarettes     Start date: 6/15/1957   • Smokeless tobacco: Former User     Types: Chew     Quit date: 1957   Substance and Sexual Activity   • Alcohol use: No     Alcohol/week: 0.0 oz     Comment: quit 28yrs ago   • Drug use: No     Types: Marijuana     Comment: quit in 1969, THC Hash    • Sexual activity: Never     Partners: Female     Birth control/protection: Condom   Lifestyle   • Physical  activity     Days per week: Not on file     Minutes per session: Not on file   • Stress: Not on file   Relationships   • Social connections     Talks on phone: Not on file     Gets together: Not on file     Attends Christian service: Not on file     Active member of club or organization: Not on file     Attends meetings of clubs or organizations: Not on file     Relationship status: Not on file   • Intimate partner violence     Fear of current or ex partner: Not on file     Emotionally abused: Not on file     Physically abused: Not on file     Forced sexual activity: Not on file   Other Topics Concern   • Not on file   Social History Narrative   • Not on file       Allergies as of 09/17/2020 - Reviewed 09/17/2020   Allergen Reaction Noted   • Chantix [varenicline] Nausea 10/09/2018   • Fish Vomiting 11/11/2011        Vitals:  @Vital signs for this encounter:    Current medications as of today   Current Outpatient Medications   Medication Sig Dispense Refill   • umeclidinium-vilanterol (ANORO ELLIPTA) 62.5-25 MCG/INH AEROSOL POWDER, BREATH ACTIVATED inhaler Inhale 1 Puff by mouth every day. 1 Each 11   • clopidogrel (PLAVIX) 75 MG Tab Take 1 Tab by mouth every day. 30 Tab 11   • atorvastatin (LIPITOR) 40 MG Tab Take 1 Tab by mouth every day. 30 Tab 11   • lisinopril (PRINIVIL) 5 MG Tab Take 1 Tab by mouth every day. 30 Tab 11   • metoprolol SR (TOPROL XL) 50 MG TABLET SR 24 HR Take 1 Tab by mouth every day. 30 Tab 11   • albuterol 108 (90 Base) MCG/ACT Aero Soln inhalation aerosol Inhale 2 Puffs by mouth every 6 hours as needed for Shortness of Breath. 8.5 g 11   • aspirin EC (ECOTRIN) 81 MG Tablet Delayed Response Take 81 mg by mouth every day.       No current facility-administered medications for this visit.          Physical Exam:   Gen:           Alert and oriented, No apparent distress. Mood and affect appropriate, normal interaction with examiner.  Eyes:          PERRL, EOM intact, sclere white, conjunctive  moist.  Ears:          Not examined.   Hearing:     Grossly intact.  Nose:          Normal, no lesions or deformities.  Dentition:    Good dentition.  Oropharynx:   mask  Mallampati Classification: mask  Neck:        Supple, trachea midline, no masses.  Respiratory Effort: No intercostal retractions or use of accessory muscles.   Lung Auscultation:      Clear to auscultation bilaterally but diminished t/o; no rales, rhonchi or wheezing.  CV:            Regular rate and rhythm. No murmurs, rubs or gallops.  Abd:           Not examined.   Lymphadenopathy: Not examined.  Gait and Station: Normal.  Digits and Nails: No clubbing, cyanosis, petechiae, or nodes.   Cranial Nerves: II-XII grossly intact.  Skin:        No rashes, lesions or ulcers noted.               Ext:           No cyanosis or edema.      Assessment:  1. Panlobular emphysema (HCC)     2. Nocturnal hypoxia     3. AMIRA (obstructive sleep apnea)     4. Tobacco abuse     5. BMI 27.0-27.9,adult     6. Current smoker         Immunizations:    Flu:recommend  Pneumovax 23:12/2013  Prevnar 13:9/2015    Plan:  1. COPD is clinically stable. Continue current bronchodilators.   2. Obtain flu vaccine at end of month.  3. Smoking cessation strongly encouraged and reviewed.  4. Discussed respiratory hygiene.  5. Encouraged activity during improved air quality times.  6. Complete LDCT; referral sent.  7. F/u in 4 mos with PFT/CT results, sooner if needed.    Please note that this dictation was created using voice recognition software. I have made every reasonable attempt to correct obvious errors, but it is possible there are errors of grammar and possibly content that I did not discover before finalizing the note.

## 2020-09-23 ENCOUNTER — TELEPHONE (OUTPATIENT)
Dept: HEMATOLOGY ONCOLOGY | Facility: MEDICAL CENTER | Age: 75
End: 2020-09-23

## 2020-09-23 DIAGNOSIS — Z87.891 PERSONAL HISTORY OF NICOTINE DEPENDENCE: ICD-10-CM

## 2020-09-23 DIAGNOSIS — Z12.2 ENCOUNTER FOR SCREENING FOR MALIGNANT NEOPLASM OF RESPIRATORY ORGANS: ICD-10-CM

## 2020-09-23 NOTE — TELEPHONE ENCOUNTER
Your patient is due for his ANNUAL LDCT. Please place an order for the LDCT by selecting the LUNG CANCER SCREENING CT, when asked if the patient has completed a Shared Decision Making Appointment select yes. You will need to verify eligibility each year to ensure your patient meets criteria. If you need assistance I can email you the reference guide for ordering. Once the order has been placed, radiology scheduling will reach out to the patient to schedule his annual LDCT.

## 2020-09-24 ENCOUNTER — OFFICE VISIT (OUTPATIENT)
Dept: MEDICAL GROUP | Facility: MEDICAL CENTER | Age: 75
End: 2020-09-24
Attending: FAMILY MEDICINE
Payer: MEDICARE

## 2020-09-24 VITALS
HEART RATE: 75 BPM | RESPIRATION RATE: 14 BRPM | DIASTOLIC BLOOD PRESSURE: 62 MMHG | OXYGEN SATURATION: 96 % | BODY MASS INDEX: 26.67 KG/M2 | TEMPERATURE: 96.8 F | HEIGHT: 68 IN | WEIGHT: 176 LBS | SYSTOLIC BLOOD PRESSURE: 108 MMHG

## 2020-09-24 DIAGNOSIS — Z12.11 SCREENING FOR MALIGNANT NEOPLASM OF COLON: ICD-10-CM

## 2020-09-24 DIAGNOSIS — M48.061 FORAMINAL STENOSIS OF LUMBAR REGION: ICD-10-CM

## 2020-09-24 DIAGNOSIS — M19.90 ARTHRITIS: ICD-10-CM

## 2020-09-24 DIAGNOSIS — J43.1 PANLOBULAR EMPHYSEMA (HCC): ICD-10-CM

## 2020-09-24 DIAGNOSIS — I73.9 INTERMITTENT CLAUDICATION (HCC): ICD-10-CM

## 2020-09-24 DIAGNOSIS — Z23 NEED FOR 23-POLYVALENT PNEUMOCOCCAL POLYSACCHARIDE VACCINE: ICD-10-CM

## 2020-09-24 DIAGNOSIS — I73.9 PVD (PERIPHERAL VASCULAR DISEASE) (HCC): ICD-10-CM

## 2020-09-24 PROCEDURE — 99213 OFFICE O/P EST LOW 20 MIN: CPT | Performed by: FAMILY MEDICINE

## 2020-09-24 PROCEDURE — 99213 OFFICE O/P EST LOW 20 MIN: CPT | Mod: 25 | Performed by: FAMILY MEDICINE

## 2020-09-24 PROCEDURE — 90732 PPSV23 VACC 2 YRS+ SUBQ/IM: CPT | Performed by: FAMILY MEDICINE

## 2020-09-24 RX ORDER — HYDROCODONE BITARTRATE AND ACETAMINOPHEN 5; 325 MG/1; MG/1
1 TABLET ORAL EVERY 8 HOURS PRN
Qty: 90 TAB | Refills: 0 | Status: SHIPPED | OUTPATIENT
Start: 2020-12-03 | End: 2020-12-21 | Stop reason: SDUPTHER

## 2020-09-24 RX ORDER — HYDROCODONE BITARTRATE AND ACETAMINOPHEN 5; 325 MG/1; MG/1
1 TABLET ORAL EVERY 8 HOURS PRN
Qty: 90 TAB | Refills: 0 | Status: SHIPPED | OUTPATIENT
Start: 2020-10-03 | End: 2020-09-24 | Stop reason: SDUPTHER

## 2020-09-24 RX ORDER — HYDROCODONE BITARTRATE AND ACETAMINOPHEN 5; 325 MG/1; MG/1
1 TABLET ORAL EVERY 8 HOURS PRN
Qty: 90 TAB | Refills: 0 | Status: SHIPPED | OUTPATIENT
Start: 2020-11-03 | End: 2020-12-03

## 2020-09-24 ASSESSMENT — FIBROSIS 4 INDEX: FIB4 SCORE: 1.05

## 2020-09-24 NOTE — PROGRESS NOTES
"Subjective:      Ángel Gonsalez Jr. is a 74 y.o. male who presents with Follow-Up            HPI 1.  Peripheral vascular disease-patient reports he still smoking about a pack per day.  He reports is comfortable walking distance before he gets severe pain in his hips and now both calves is decreasing now to about 1 block.  Pain will clear with rest.  ALEX back in February 2020 showed indication of decreased circulation only after exercise and only in the left leg.  Arterial ultrasound showed an occluded posterior tibial artery on the left side.  Patient would entertain thoughts of revascularization surgery if that was offered.  Not reporting any chest pain with exertion.    ROS negative for abdominal pain, ankle edema, cyanosis       Objective:     /62 (BP Location: Right arm, Patient Position: Sitting, BP Cuff Size: Adult)   Pulse 75   Temp 36 °C (96.8 °F) (Temporal)   Resp 14   Ht 1.715 m (5' 7.5\")   Wt 79.8 kg (176 lb)   SpO2 96%   BMI 27.16 kg/m²      Physical Exam  Gen.- alert, cooperative, in no acute distress  Neck- midline trachea, thyroid not enlarged or tender,supple, no cervical adenopathy  Chest-clear to auscultation and percussion with normal breath sounds. No retractions. Chest wall nontender  Cardiac- regular rhythm and rate. No murmur, thrill, or heave            Assessment/Plan:        1. Panlobular emphysema (HCC)      2. Intermittent claudication (HCC)      3. PVD (peripheral vascular disease) (HCC)      4. Screening for malignant neoplasm of colon-we will mail patient a home stool check kit      "

## 2020-10-03 ENCOUNTER — HOSPITAL ENCOUNTER (OUTPATIENT)
Facility: MEDICAL CENTER | Age: 75
End: 2020-10-03
Attending: FAMILY MEDICINE
Payer: MEDICARE

## 2020-10-03 PROCEDURE — 82274 ASSAY TEST FOR BLOOD FECAL: CPT

## 2020-10-09 ENCOUNTER — TELEPHONE (OUTPATIENT)
Dept: MEDICAL GROUP | Facility: MEDICAL CENTER | Age: 75
End: 2020-10-09

## 2020-10-09 DIAGNOSIS — Z12.11 SCREENING FOR MALIGNANT NEOPLASM OF COLON: ICD-10-CM

## 2020-10-09 NOTE — TELEPHONE ENCOUNTER
Dr. Solano.     The  [facility] lab called and states that they received a stool sample from patient but there is not an order in Epic.     Please advise.     Thank you,     Jaun Gutierrez  Medical Assistant

## 2020-10-14 DIAGNOSIS — Z12.11 SCREENING FOR MALIGNANT NEOPLASM OF COLON: ICD-10-CM

## 2020-10-21 LAB — HEMOCCULT STL QL IA: POSITIVE

## 2020-10-22 ENCOUNTER — TELEPHONE (OUTPATIENT)
Dept: MEDICAL GROUP | Facility: MEDICAL CENTER | Age: 75
End: 2020-10-22

## 2020-10-22 ENCOUNTER — NON-PROVIDER VISIT (OUTPATIENT)
Dept: MEDICAL GROUP | Facility: MEDICAL CENTER | Age: 75
End: 2020-10-22
Attending: FAMILY MEDICINE
Payer: MEDICARE

## 2020-10-22 DIAGNOSIS — Z23 NEED FOR VACCINATION: ICD-10-CM

## 2020-10-22 DIAGNOSIS — R19.5 POSITIVE FIT (FECAL IMMUNOCHEMICAL TEST): ICD-10-CM

## 2020-10-22 PROCEDURE — 90662 IIV NO PRSV INCREASED AG IM: CPT

## 2020-10-22 NOTE — PROGRESS NOTES
"Ángel Gonsalez Jr. is a 74 y.o. male here for a non-provider visit for:   FLU    Reason for immunization: Annual Flu Vaccine  Immunization records indicate need for vaccine: Yes, confirmed with Epic  Minimum interval has been met for this vaccine: Yes  ABN completed: Yes    Order and dose verified by: PEPITO  VIS Dated  08/15/2019 was given to patient: Yes  All IAC Questionnaire questions were answered \"No.\"    Patient tolerated injection and no adverse effects were observed or reported: Yes    Pt scheduled for next dose in series: Not Indicated    "

## 2020-10-23 ENCOUNTER — TELEPHONE (OUTPATIENT)
Dept: MEDICAL GROUP | Facility: MEDICAL CENTER | Age: 75
End: 2020-10-23

## 2020-10-23 NOTE — TELEPHONE ENCOUNTER
Phone Number Called: [phone]    Call outcome: Spoke to patient regarding message below.    Message:         Spoke to Bill informed him of result notes and colonoscopy. He stated that he will not be getting it done since he has no one to take him or be getting a cab.

## 2020-12-21 ENCOUNTER — OFFICE VISIT (OUTPATIENT)
Dept: MEDICAL GROUP | Facility: MEDICAL CENTER | Age: 75
End: 2020-12-21
Attending: FAMILY MEDICINE
Payer: MEDICARE

## 2020-12-21 VITALS
OXYGEN SATURATION: 95 % | HEIGHT: 67 IN | TEMPERATURE: 97.5 F | HEART RATE: 78 BPM | BODY MASS INDEX: 28.02 KG/M2 | WEIGHT: 178.5 LBS | RESPIRATION RATE: 16 BRPM | SYSTOLIC BLOOD PRESSURE: 108 MMHG | DIASTOLIC BLOOD PRESSURE: 58 MMHG

## 2020-12-21 DIAGNOSIS — M19.90 ARTHRITIS: ICD-10-CM

## 2020-12-21 DIAGNOSIS — E13.9 DIABETES 1.5, MANAGED AS TYPE 1 (HCC): ICD-10-CM

## 2020-12-21 DIAGNOSIS — M48.061 FORAMINAL STENOSIS OF LUMBAR REGION: ICD-10-CM

## 2020-12-21 LAB
HBA1C MFR BLD: 5.9 % (ref 0–5.6)
INT CON NEG: NEGATIVE
INT CON POS: POSITIVE

## 2020-12-21 PROCEDURE — 83036 HEMOGLOBIN GLYCOSYLATED A1C: CPT | Performed by: FAMILY MEDICINE

## 2020-12-21 PROCEDURE — 99213 OFFICE O/P EST LOW 20 MIN: CPT | Performed by: FAMILY MEDICINE

## 2020-12-21 RX ORDER — HYDROCODONE BITARTRATE AND ACETAMINOPHEN 5; 325 MG/1; MG/1
1 TABLET ORAL EVERY 8 HOURS PRN
Qty: 90 TAB | Refills: 0 | Status: SHIPPED | OUTPATIENT
Start: 2021-01-02 | End: 2020-12-21 | Stop reason: SDUPTHER

## 2020-12-21 RX ORDER — HYDROCODONE BITARTRATE AND ACETAMINOPHEN 5; 325 MG/1; MG/1
1 TABLET ORAL EVERY 8 HOURS PRN
Qty: 90 TAB | Refills: 0 | Status: SHIPPED | OUTPATIENT
Start: 2021-02-02 | End: 2020-12-21 | Stop reason: SDUPTHER

## 2020-12-21 RX ORDER — HYDROCODONE BITARTRATE AND ACETAMINOPHEN 5; 325 MG/1; MG/1
1 TABLET ORAL EVERY 8 HOURS PRN
Qty: 90 TAB | Refills: 0 | Status: SHIPPED | OUTPATIENT
Start: 2021-02-02 | End: 2021-03-22 | Stop reason: SDUPTHER

## 2020-12-21 ASSESSMENT — PATIENT HEALTH QUESTIONNAIRE - PHQ9: CLINICAL INTERPRETATION OF PHQ2 SCORE: 0

## 2020-12-21 ASSESSMENT — FIBROSIS 4 INDEX: FIB4 SCORE: 1.06

## 2020-12-21 NOTE — PROGRESS NOTES
"Subjective:      Ángel Gonsalez Jr. is a 75 y.o. male who presents with Medication Refill and Follow-Up            HPI 1.  Bilateral hip pain-patient continues to report increasing bilateral hip pain along with low back pain.  He gets partial relief using low-dose Norco 5/325 3 times daily.  He is limited to walking about 1 block before having to rest due to ischemia as well.  He is followed by Dr. Villalobos and had a recent visit but apparently no plans for surgery were discussed.  Currently smoking about 1/2 pack of cigarettes per day.  Is about to switch over to [facility] which will require him to find a new PCP since the [facility] cannot bill the Medicare advantage plans.  2.  Elevated fasting glucose-patient has been noted to have moderate elevated fasting sugars in the past.  Has never been diagnosed with diabetes.  He reports that he is a fan of junk food.  Not reporting polyuria    ROS negative for urinary incontinence, fecal incontinence, pelvic pain       Objective:     /58 (BP Location: Left arm, Patient Position: Sitting, BP Cuff Size: Adult)   Pulse 78   Temp 36.4 °C (97.5 °F) (Temporal)   Resp 16   Ht 1.702 m (5' 7\")   Wt 81 kg (178 lb 8 oz)   SpO2 95%   BMI 27.96 kg/m²      Physical Exam  Gen.- alert, cooperative, in no acute distress  Neck- midline trachea, thyroid not enlarged or tender,supple, no cervical adenopathy  Chest-clear to auscultation and percussion with normal breath sounds. No retractions. Chest wall nontender  Cardiac- regular rhythm and rate. No murmur, thrill, or heave         A1c-5.9  Assessment/Plan:        1. Arthritis    - HYDROcodone-acetaminophen (NORCO) 5-325 MG Tab per tablet; Take 1 Tab by mouth every 8 hours as needed for up to 30 days. Prescription must last 30 days.  Dispense: 90 Tab; Refill: 0  - DX-HIP-BILATERAL-WITH PELVIS-2 VIEWS; Future    2. Foraminal stenosis of lumbar region    - HYDROcodone-acetaminophen (NORCO) 5-325 MG Tab per " tablet; Take 1 Tab by mouth every 8 hours as needed for up to 30 days. Prescription must last 30 days.  Dispense: 90 Tab; Refill: 0  Plan: 1.  Update hip x-rays to evaluate degree of arthritic change and see if this is consistent with his current level of pain  2.  Patient will check on scheduling for previously scheduled lung cancer screening CT scan  3.  Renew Norco x3 months

## 2020-12-30 ENCOUNTER — HOSPITAL ENCOUNTER (OUTPATIENT)
Dept: RADIOLOGY | Facility: MEDICAL CENTER | Age: 75
End: 2020-12-30
Attending: NURSE PRACTITIONER
Payer: MEDICARE

## 2020-12-30 ENCOUNTER — HOSPITAL ENCOUNTER (OUTPATIENT)
Dept: RADIOLOGY | Facility: MEDICAL CENTER | Age: 75
End: 2020-12-30
Attending: FAMILY MEDICINE
Payer: MEDICARE

## 2020-12-30 DIAGNOSIS — Z87.891 PERSONAL HISTORY OF NICOTINE DEPENDENCE: ICD-10-CM

## 2020-12-30 DIAGNOSIS — Z12.2 ENCOUNTER FOR SCREENING FOR MALIGNANT NEOPLASM OF RESPIRATORY ORGANS: ICD-10-CM

## 2020-12-30 DIAGNOSIS — M19.90 ARTHRITIS: ICD-10-CM

## 2020-12-30 PROCEDURE — 73521 X-RAY EXAM HIPS BI 2 VIEWS: CPT

## 2020-12-30 PROCEDURE — G0297 LDCT FOR LUNG CA SCREEN: HCPCS

## 2021-01-15 DIAGNOSIS — Z23 NEED FOR VACCINATION: ICD-10-CM

## 2021-02-23 ENCOUNTER — OFFICE VISIT (OUTPATIENT)
Dept: SLEEP MEDICINE | Facility: MEDICAL CENTER | Age: 76
End: 2021-02-23
Payer: MEDICARE

## 2021-02-23 ENCOUNTER — NON-PROVIDER VISIT (OUTPATIENT)
Dept: SLEEP MEDICINE | Facility: MEDICAL CENTER | Age: 76
End: 2021-02-23
Payer: MEDICARE

## 2021-02-23 VITALS
OXYGEN SATURATION: 95 % | WEIGHT: 173 LBS | HEIGHT: 68 IN | SYSTOLIC BLOOD PRESSURE: 110 MMHG | DIASTOLIC BLOOD PRESSURE: 60 MMHG | RESPIRATION RATE: 16 BRPM | BODY MASS INDEX: 26.22 KG/M2 | HEART RATE: 80 BPM

## 2021-02-23 VITALS — HEIGHT: 68 IN | WEIGHT: 173 LBS | BODY MASS INDEX: 26.22 KG/M2

## 2021-02-23 DIAGNOSIS — Z87.891 PERSONAL HISTORY OF NICOTINE DEPENDENCE: ICD-10-CM

## 2021-02-23 DIAGNOSIS — J43.1 PANLOBULAR EMPHYSEMA (HCC): ICD-10-CM

## 2021-02-23 DIAGNOSIS — G47.34 NOCTURNAL HYPOXIA: ICD-10-CM

## 2021-02-23 DIAGNOSIS — J43.1 PANLOBULAR EMPHYSEMA (HCC): Chronic | ICD-10-CM

## 2021-02-23 DIAGNOSIS — Z72.0 TOBACCO ABUSE: Chronic | ICD-10-CM

## 2021-02-23 DIAGNOSIS — Z72.0 TOBACCO ABUSE: ICD-10-CM

## 2021-02-23 PROCEDURE — 94726 PLETHYSMOGRAPHY LUNG VOLUMES: CPT | Performed by: INTERNAL MEDICINE

## 2021-02-23 PROCEDURE — 94729 DIFFUSING CAPACITY: CPT | Performed by: INTERNAL MEDICINE

## 2021-02-23 PROCEDURE — 94060 EVALUATION OF WHEEZING: CPT | Performed by: INTERNAL MEDICINE

## 2021-02-23 PROCEDURE — 99406 BEHAV CHNG SMOKING 3-10 MIN: CPT | Performed by: PHYSICIAN ASSISTANT

## 2021-02-23 PROCEDURE — 99214 OFFICE O/P EST MOD 30 MIN: CPT | Mod: 25 | Performed by: PHYSICIAN ASSISTANT

## 2021-02-23 ASSESSMENT — PULMONARY FUNCTION TESTS
FEV1/FVC_PERCENT_CHANGE: 229
FEV1_LLN: 2.43
FEV1/FVC: 52
FEV1/FVC_PERCENT_PREDICTED: 76
FEV1/FVC_PERCENT_CHANGE: 15
FEV1/FVC_PREDICTED: 76
FEV1/FVC_PERCENT_PREDICTED: 68
FEV1: 1.5
FEV1/FVC: 45
FEV1_PERCENT_CHANGE: 14
FVC: 3.82
FVC_PERCENT_PREDICTED: 98
FVC_LLN: 3.22
FEV1_PERCENT_PREDICTED: 67
FEV1/FVC: 51.83
FEV1/FVC_PERCENT_PREDICTED: 68
FVC_PERCENT_PREDICTED: 86
FEV1/FVC_PERCENT_LLN: 63
FEV1/FVC: 45
FEV1/FVC_PERCENT_PREDICTED: 58
FEV1_PERCENT_PREDICTED: 51
FEV1: 1.98
FVC: 3.35
FEV1_PERCENT_CHANGE: 32
FEV1/FVC_PERCENT_PREDICTED: 59
FEV1_PREDICTED: 2.92
FVC_PREDICTED: 3.86

## 2021-02-23 ASSESSMENT — ENCOUNTER SYMPTOMS
ORTHOPNEA: 0
PALPITATIONS: 0
SORE THROAT: 0
DIZZINESS: 0
TREMORS: 0
SPUTUM PRODUCTION: 0
WHEEZING: 1
HEADACHES: 0
INSOMNIA: 0
HEARTBURN: 0
CHILLS: 0
SHORTNESS OF BREATH: 1
WEIGHT LOSS: 0
FEVER: 0
COUGH: 1

## 2021-02-23 ASSESSMENT — FIBROSIS 4 INDEX
FIB4 SCORE: 1.06
FIB4 SCORE: 1.06

## 2021-02-23 NOTE — PROCEDURES
Technician: Desirae Elaine RRT   Good patient effort & cooperation.  The results of this test meet the ATS/ERS standards for acceptability & reproducibility.  Test was performed on the [employer]rafuse Body Plethysmograph-Elite DX system.  Predicted equations for Spirometry are GLI-2012, ITS for lung volumes, and GLI-2017 for DLCO.  The DLCO was uncorrected for Hgb.  A bronchodilator of Ventolin HFA -2puffs via spacer administered.  DLCO performed during dilation period.    1. Baseline spirometry demonstrates a moderate to severe reduction in FEV1 at 1.50 L (51% predicted). FEV1/FVC ratio is reduced at 45%.    2. After administration of an inhaled bronchodilator there is 32% improvement in FEV1.    3. Lung volumes demonstrate hyperinflation.    4. Gas exchange as estimated by DLCO is reduced at 68% of predicted.    5. Airway resistance is increased.      Impression:    This study demonstrates the presence of moderate obstructive lung disease.  Definite reversibility is noted on the study.

## 2021-02-23 NOTE — PATIENT INSTRUCTIONS
1-covid 19 precautions including wearing mask in public, social distancing, frequent handwashing, had flu shot  2-provided info for [facility] for vaccine  3-continue same regimen  4-reviewed PFT and CT scan  5-follow up in 6 months

## 2021-02-23 NOTE — PROGRESS NOTES
CC: Reports shortness of breath all the time.    HPI:  Fredy Gonsalez Jr. is a 75 y.o. year old male here today for follow-up on COPD, AMIRA and nocturnal hypoxia.  Patient last seen in clinic 9/17/2020 by GABBIE Couch.  He is a current smoker with 90-pack-year history.  He does have a remote history of chewing tobacco use with reported quit date 1957.    Pertinent past medical history includes near syncope, coronary artery disease, peripheral vascular disease, chronic renal failure stage III, asbestos exposure.  Family history of lung cancer.  Last hospitalization for his COPD was 1/11/2020 for 2 days.    Reviewed in clinic vitals including blood pressure 110/60, heart rate of 80, O2 sat of 95% and BMI of 26.3 kg/m².    Reviewed home medication regimen including Anoro, lisinopril-denies nonproductive cough, albuterol which he reports using every day going through 1 inhaler per month most months.  Patient requires 2 L O2 at night, he did not tolerate CPAP and reports return CPAP unit to [facility] [place].    Reviewed most recent imaging including CT lung cancer screening obtained 12/30/2020 demonstrating emphysema, scattered areas of fibrosis and interstitial lung disease, slightly more prominent than prior exam.  No evidence of pulmonary nodule or mass, small hiatal hernia.    Echocardiogram obtained 6/6/2020 demonstrating normal left ventricular chamber size, wall thickness and systolic function.  LVEF estimated 70%, indeterminant diastolic function, normal right ventricular size and function.  Mild to moderate aortic insufficiency.  Unable to estimate pulmonary artery pressure due to inadequate tricuspid regurgitant jet.    Chest x-ray 6/6/2020 demonstrated mild chronic interstitial changes, no acute or new abnormality.    Pulmonary function testing obtained 2/23/2021 demonstrated an FEV1 of 1.5 L or 51% predicted, FVC of 3.35 L or 86% predicted, FEV1/FVC ratio 45, postbronchodilator increase in FEV1 of 32%,  residual volume 138% predicted, % predicted, DLCO 68% predicted.  Pulmonologist interpretation pending.  As compared to prior PFT obtained in 2019 patient shows significant decrease in his pulmonary function.      Review of Systems   Constitutional: Negative for chills, fever, malaise/fatigue and weight loss.   HENT: Positive for congestion (just in am after wearing oxygen at night ) and hearing loss. Negative for nosebleeds, sore throat and tinnitus.    Eyes:        Has but doesn't wear   Respiratory: Positive for cough (in am), shortness of breath (all the time) and wheezing. Negative for sputum production.    Cardiovascular: Positive for chest pain (occasional, self resolves). Negative for palpitations, orthopnea and leg swelling.   Gastrointestinal: Negative for heartburn.        Down to 9 teeth, no difficulty swallowing    Neurological: Negative for dizziness, tremors and headaches.   Psychiatric/Behavioral: The patient does not have insomnia.        Past Medical History:   Diagnosis Date   • Arthritis    • COPD    • EMPHYSEMA    • Essential hypertension 12/2/2019   • Heart attack (HCC) 12/02/2017    received 3 stents, [facility]   • Hyperlipidemia 4/17/2012   • Pain in joint, multiple sites        Past Surgical History:   Procedure Laterality Date   • STENT PLACEMENT  12/02/2017    left main to proximal LAD, mid LAD, ostial left main   • OPEN REDUCTION      left arm. 32 yrs ago       Family History   Problem Relation Age of Onset   • Arthritis Mother    • Lung Disease Mother    • Osteoporosis Mother    • Alzheimer's Disease Mother    • Other Mother         parkinsons   • Kidney Disease Father    • Cancer Neg Hx    • Diabetes Neg Hx    • Heart Disease Neg Hx    • Stroke Neg Hx        Social History     Socioeconomic History   • Marital status: Single     Spouse name: Not on file   • Number of children: Not on file   • Years of education: Not on file   • Highest education level: Not on file  "  Occupational History   • Not on file   Tobacco Use   • Smoking status: Current Every Day Smoker     Packs/day: 1.50     Years: 60.00     Pack years: 90.00     Types: Cigarettes     Start date: 6/15/1957   • Smokeless tobacco: Former User     Types: Chew     Quit date: 1957   Substance and Sexual Activity   • Alcohol use: No     Alcohol/week: 0.0 oz     Comment: quit 28yrs ago   • Drug use: No     Types: Marijuana     Comment: quit in 1969, THC Hash    • Sexual activity: Never     Partners: Female     Birth control/protection: Condom   Other Topics Concern   • Not on file   Social History Narrative   • Not on file     Social Determinants of Health     Financial Resource Strain:    • Difficulty of Paying Living Expenses:    Food Insecurity:    • Worried About Running Out of Food in the Last Year:    • Ran Out of Food in the Last Year:    Transportation Needs:    • Lack of Transportation (Medical):    • Lack of Transportation (Non-Medical):    Physical Activity:    • Days of Exercise per Week:    • Minutes of Exercise per Session:    Stress:    • Feeling of Stress :    Social Connections:    • Frequency of Communication with Friends and Family:    • Frequency of Social Gatherings with Friends and Family:    • Attends [employer] Services:    • Active Member of Clubs or Organizations:    • Attends Club or Organization Meetings:    • Marital Status:    Intimate Partner Violence:    • Fear of Current or Ex-Partner:    • Emotionally Abused:    • Physically Abused:    • Sexually Abused:        Allergies as of 02/23/2021 - Reviewed 02/23/2021   Allergen Reaction Noted   • Chantix [varenicline] Nausea 10/09/2018   • Fish Vomiting 11/11/2011        @Vital signs for this encounter:  Vitals:    02/23/21 0750   Height: 1.727 m (5' 8\")   Weight: 78.5 kg (173 lb)   Weight % change since last entry.: 0 %   BP: 110/60   Pulse: 80   BMI (Calculated): 26.3   Resp: 16       Current medications as of today   Current Outpatient " Medications   Medication Sig Dispense Refill   • HYDROcodone-acetaminophen (NORCO) 5-325 MG Tab per tablet Take 1 Tab by mouth every 8 hours as needed for up to 30 days. Prescription must last 30 days. 90 Tab 0   • umeclidinium-vilanterol (ANORO ELLIPTA) 62.5-25 MCG/INH AEROSOL POWDER, BREATH ACTIVATED inhaler Inhale 1 Puff by mouth every day. 1 Each 11   • clopidogrel (PLAVIX) 75 MG Tab Take 1 Tab by mouth every day. 30 Tab 11   • atorvastatin (LIPITOR) 40 MG Tab Take 1 Tab by mouth every day. 30 Tab 11   • lisinopril (PRINIVIL) 5 MG Tab Take 1 Tab by mouth every day. 30 Tab 11   • metoprolol SR (TOPROL XL) 50 MG TABLET SR 24 HR Take 1 Tab by mouth every day. 30 Tab 11   • albuterol 108 (90 Base) MCG/ACT Aero Soln inhalation aerosol Inhale 2 Puffs by mouth every 6 hours as needed for Shortness of Breath. 8.5 g 11   • aspirin EC (ECOTRIN) 81 MG Tablet Delayed Response Take 81 mg by mouth every day.       No current facility-administered medications for this visit.         Physical Exam:   Gen:           Alert and oriented, No apparent distress. Mood and affect appropriate, normal interaction with provider.  Eyes:          sclere white, conjunctive moist.  Hearing:     Grossly intact.  Dentition:    Poor dentition, down to 9 teeth  Oropharynx:   Tongue normal, posterior pharynx without erythema or exudate.  Neck:        Supple, trachea midline, no masses.  Respiratory Effort: No intercostal retractions or use of accessory muscles.   Lung Auscultation:      Fine expiratory wheezing except left upper lobe; no rales or rhonchi  CV:            Regular rate and rhythm. No edema. No murmurs, rubs or gallops.  Digits, Nails, Ext: No clubbing, cyanosis, petechiae, or nodes.   Skin:        No rashes, lesions or ulcers noted on exposed skin surfaces.                     Assessment:  1. Panlobular emphysema (HCC)     2. Tobacco abuse     3. Nocturnal hypoxia         Immunizations:    Flu: 10/22/2020  Pneumovax 23: 9/24/2020    Prevnar 13: 9/28/2015    Plan:   75 y.o. year old male here today for follow-up on COPD, AMIRA and nocturnal hypoxia.  Patient last seen in clinic 9/17/2020 by GABBIE Couch.  He is a current smoker with 90-pack-year history.  He does have a remote history of chewing tobacco use with reported quit date 1957.    Current smoker: Minimal change language elicited.  He reports having decreased from 4 packs/day to 1 pack/day and denies intention to quit smoking.  Smoking cessation counseling x5 minutes.    Pertinent past medical history includes near syncope, coronary artery disease, peripheral vascular disease, chronic renal failure stage III, asbestos exposure.  Family history of lung cancer.  Last hospitalization for his COPD was 1/11/2020 for 2 days.    Reviewed in clinic vitals including blood pressure 110/60, heart rate of 80, O2 sat of 95% and BMI of 26.3 kg/m².    Reviewed home medication regimen including Anoro, lisinopril-denies nonproductive cough, albuterol which he reports using every day going through 1 inhaler per month most months.  Patient requires 2 L O2 at night, he did not tolerate CPAP and [place] [facility] [place]    COPD: Continue current regimen, discussed keeping at least 4 hours between dosing with albuterol.    Nocturnal hypoxia: Patient wears O2 at 2 L at night, continue O2 use.    Reviewed most recent imaging including CT lung cancer screening obtained 12/30/2020 demonstrating emphysema, scattered areas of fibrosis and interstitial lung disease, slightly more prominent than prior exam.  No evidence of pulmonary nodule or mass, small hiatal hernia.    Echocardiogram obtained 6/6/2020 demonstrating normal left ventricular chamber size, wall thickness and systolic function.  LVEF estimated 70%, indeterminant diastolic function, normal right ventricular size and function.  Mild to moderate aortic insufficiency.  Unable to estimate pulmonary artery pressure due to inadequate tricuspid  regurgitant jet.    Chest x-ray 6/6/2020 demonstrated mild chronic interstitial changes, no acute or new abnormality.    Pulmonary function testing obtained 2/23/2021 demonstrated an FEV1 of 1.5 L or 51% predicted, FVC of 3.35 L or 86% predicted, FEV1/FVC ratio 45, postbronchodilator increase in FEV1 of 32%, residual volume 138% predicted, % predicted, DLCO 68% predicted.  Pulmonologist interpretation pending.  As compared to prior PFT obtained in 2019 patient shows significant decrease in his pulmonary function.    Reviewed COVID-19 precautions including wearing mask in public, social distancing, frequent handwashing, had annual flu shot.  Provided with [facility] contact information for vaccine.    Follow-up in 6 months.      This dictation was created using voice recognition software. The accuracy of the dictation is limited to the abilities of the software. I expect there may be some errors of grammar and possibly content.

## 2021-03-22 ENCOUNTER — OFFICE VISIT (OUTPATIENT)
Dept: MEDICAL GROUP | Facility: MEDICAL CENTER | Age: 76
End: 2021-03-22
Attending: FAMILY MEDICINE
Payer: MEDICARE

## 2021-03-22 VITALS
WEIGHT: 169 LBS | OXYGEN SATURATION: 94 % | HEIGHT: 68 IN | SYSTOLIC BLOOD PRESSURE: 118 MMHG | BODY MASS INDEX: 25.61 KG/M2 | HEART RATE: 68 BPM | TEMPERATURE: 98.1 F | RESPIRATION RATE: 16 BRPM | DIASTOLIC BLOOD PRESSURE: 50 MMHG

## 2021-03-22 DIAGNOSIS — M48.061 FORAMINAL STENOSIS OF LUMBAR REGION: ICD-10-CM

## 2021-03-22 DIAGNOSIS — M19.90 ARTHRITIS: ICD-10-CM

## 2021-03-22 DIAGNOSIS — J43.1 PANLOBULAR EMPHYSEMA (HCC): ICD-10-CM

## 2021-03-22 DIAGNOSIS — Z95.5 S/P CORONARY ARTERY STENT PLACEMENT: ICD-10-CM

## 2021-03-22 PROCEDURE — 99213 OFFICE O/P EST LOW 20 MIN: CPT | Performed by: FAMILY MEDICINE

## 2021-03-22 RX ORDER — CLOPIDOGREL BISULFATE 75 MG/1
75 TABLET ORAL DAILY
Qty: 30 TABLET | Refills: 11 | Status: SHIPPED | OUTPATIENT
Start: 2021-03-22 | End: 2022-03-24

## 2021-03-22 RX ORDER — HYDROCODONE BITARTRATE AND ACETAMINOPHEN 5; 325 MG/1; MG/1
1 TABLET ORAL EVERY 8 HOURS PRN
Qty: 90 TABLET | Refills: 0 | Status: SHIPPED | OUTPATIENT
Start: 2021-04-03 | End: 2021-05-04 | Stop reason: SDUPTHER

## 2021-03-22 RX ORDER — METOPROLOL SUCCINATE 50 MG/1
50 TABLET, EXTENDED RELEASE ORAL DAILY
Qty: 30 TABLET | Refills: 11 | Status: SHIPPED | OUTPATIENT
Start: 2021-03-22 | End: 2022-03-24

## 2021-03-22 RX ORDER — ALBUTEROL SULFATE 90 UG/1
2 AEROSOL, METERED RESPIRATORY (INHALATION) EVERY 6 HOURS PRN
Qty: 8.5 G | Refills: 11 | Status: SHIPPED | OUTPATIENT
Start: 2021-03-22

## 2021-03-22 RX ORDER — ATORVASTATIN CALCIUM 40 MG/1
40 TABLET, FILM COATED ORAL DAILY
Qty: 30 TABLET | Refills: 11 | Status: SHIPPED | OUTPATIENT
Start: 2021-03-22 | End: 2022-03-24

## 2021-03-22 RX ORDER — LISINOPRIL 5 MG/1
5 TABLET ORAL DAILY
Qty: 30 TABLET | Refills: 11 | Status: SHIPPED | OUTPATIENT
Start: 2021-03-22 | End: 2022-03-24

## 2021-03-22 ASSESSMENT — FIBROSIS 4 INDEX: FIB4 SCORE: 1.06

## 2021-03-22 NOTE — PROGRESS NOTES
"Subjective:      Ángel Gonsalez Jr. is a 75 y.o. male who presents with Arthritis            HPI 1.  Chronic opiate use-patient returns for refill of Norco 5/325 which he takes 3 times daily.  Currently due to intermittent claudication he is limited to walking about a block before he has to sit down and rest briefly.  Not reporting constipation or confusion or any lost prescriptions.  2.  COPD patient continues to smoke 1 pack of cigarettes per day.  There is some dyspnea that increases with exertion.  Is followed regularly by pulmonary.  Currently is using Anoro, and regularly using his albuterol rescue inhaler.  Patient notes occasional productive cough but no hemoptysis.    ROS negative for current chest pain, ankle edema, fever       Objective:     /50 (BP Location: Left arm, Patient Position: Sitting, BP Cuff Size: Adult)   Pulse 68   Temp 36.7 °C (98.1 °F) (Temporal)   Resp 16   Ht 1.727 m (5' 7.99\")   Wt 76.7 kg (169 lb)   SpO2 94%   BMI 25.70 kg/m²      Physical Exam  Gen.- alert, cooperative, in no acute distress  Neck- midline trachea, thyroid not enlarged or tender,supple, no cervical adenopathy  Chest-clear to auscultation and percussion with normal breath sounds. No retractions. Chest wall nontender  Cardiac- regular rhythm and rate. No murmur, thrill, or heave            Assessment/Plan:        1. Arthritis    - HYDROcodone-acetaminophen (NORCO) 5-325 MG Tab per tablet; Take 1 tablet by mouth every 8 hours as needed for up to 30 days. Prescription must last 30 days.  Dispense: 90 tablet; Refill: 0    2. Foraminal stenosis of lumbar region    - HYDROcodone-acetaminophen (NORCO) 5-325 MG Tab per tablet; Take 1 tablet by mouth every 8 hours as needed for up to 30 days. Prescription must last 30 days.  Dispense: 90 tablet; Refill: 0    3. S/P coronary artery stent placement    - clopidogrel (PLAVIX) 75 MG Tab; Take 1 tablet by mouth every day.  Dispense: 30 tablet; Refill: 11  - " atorvastatin (LIPITOR) 40 MG Tab; Take 1 tablet by mouth every day.  Dispense: 30 tablet; Refill: 11  - lisinopril (PRINIVIL) 5 MG Tab; Take 1 tablet by mouth every day.  Dispense: 30 tablet; Refill: 11  - metoprolol SR (TOPROL XL) 50 MG TABLET SR 24 HR; Take 1 tablet by mouth every day.  Dispense: 30 tablet; Refill: 11    4. Panlobular emphysema (HCC)    - albuterol 108 (90 Base) MCG/ACT Aero Soln inhalation aerosol; Inhale 2 Puffs every 6 hours as needed for Shortness of Breath.  Dispense: 8.5 g; Refill: 11  Plan: 1.  Norco along with several other medications are refilled at this time.  2.  Patient will call for next [place] refill of approximately April 25  3.  Patient provided with Covid vaccine contact information to schedule his vaccine

## 2021-05-04 DIAGNOSIS — M19.90 ARTHRITIS: ICD-10-CM

## 2021-05-04 DIAGNOSIS — M48.061 FORAMINAL STENOSIS OF LUMBAR REGION: ICD-10-CM

## 2021-05-04 RX ORDER — HYDROCODONE BITARTRATE AND ACETAMINOPHEN 5; 325 MG/1; MG/1
1 TABLET ORAL EVERY 8 HOURS PRN
Qty: 90 TABLET | Refills: 0 | Status: SHIPPED | OUTPATIENT
Start: 2021-05-04 | End: 2021-05-27 | Stop reason: SDUPTHER

## 2021-05-27 DIAGNOSIS — M19.90 ARTHRITIS: ICD-10-CM

## 2021-05-27 DIAGNOSIS — M48.061 FORAMINAL STENOSIS OF LUMBAR REGION: ICD-10-CM

## 2021-05-27 NOTE — TELEPHONE ENCOUNTER
Received request via: Patient, (informed Doc is out of the office and request will be handled when he gets back in on Tuesday)    Was the patient seen in the last year in this department? Yes    Does the patient have an active prescription (recently filled or refills available) for medication(s) requested? No

## 2021-05-31 RX ORDER — HYDROCODONE BITARTRATE AND ACETAMINOPHEN 5; 325 MG/1; MG/1
1 TABLET ORAL EVERY 8 HOURS PRN
Qty: 90 TABLET | Refills: 0 | Status: SHIPPED | OUTPATIENT
Start: 2021-06-03 | End: 2021-06-22 | Stop reason: SDUPTHER

## 2021-06-22 ENCOUNTER — OFFICE VISIT (OUTPATIENT)
Dept: MEDICAL GROUP | Facility: MEDICAL CENTER | Age: 76
End: 2021-06-22
Attending: FAMILY MEDICINE
Payer: MEDICARE

## 2021-06-22 VITALS
TEMPERATURE: 97 F | BODY MASS INDEX: 26.83 KG/M2 | OXYGEN SATURATION: 96 % | HEIGHT: 68 IN | HEART RATE: 76 BPM | WEIGHT: 177 LBS | RESPIRATION RATE: 16 BRPM | SYSTOLIC BLOOD PRESSURE: 108 MMHG | DIASTOLIC BLOOD PRESSURE: 48 MMHG

## 2021-06-22 DIAGNOSIS — M48.061 FORAMINAL STENOSIS OF LUMBAR REGION: ICD-10-CM

## 2021-06-22 DIAGNOSIS — M19.90 ARTHRITIS: ICD-10-CM

## 2021-06-22 DIAGNOSIS — J43.1 PANLOBULAR EMPHYSEMA (HCC): ICD-10-CM

## 2021-06-22 PROCEDURE — 99213 OFFICE O/P EST LOW 20 MIN: CPT | Performed by: FAMILY MEDICINE

## 2021-06-22 RX ORDER — HYDROCODONE BITARTRATE AND ACETAMINOPHEN 5; 325 MG/1; MG/1
1 TABLET ORAL EVERY 8 HOURS PRN
Qty: 90 TABLET | Refills: 0 | Status: SHIPPED | OUTPATIENT
Start: 2021-07-03 | End: 2021-08-02

## 2021-06-22 RX ORDER — HYDROCODONE BITARTRATE AND ACETAMINOPHEN 5; 325 MG/1; MG/1
1 TABLET ORAL EVERY 8 HOURS PRN
Qty: 90 TABLET | Refills: 0 | Status: SHIPPED | OUTPATIENT
Start: 2021-08-02 | End: 2021-09-07 | Stop reason: SDUPTHER

## 2021-06-22 RX ORDER — HYDROCODONE BITARTRATE AND ACETAMINOPHEN 5; 325 MG/1; MG/1
1 TABLET ORAL EVERY 8 HOURS PRN
Qty: 90 TABLET | Refills: 0 | Status: SHIPPED | OUTPATIENT
Start: 2021-09-01 | End: 2021-08-23

## 2021-06-22 ASSESSMENT — PATIENT HEALTH QUESTIONNAIRE - PHQ9: CLINICAL INTERPRETATION OF PHQ2 SCORE: 0

## 2021-06-22 ASSESSMENT — FIBROSIS 4 INDEX: FIB4 SCORE: 1.06

## 2021-06-22 NOTE — PROGRESS NOTES
"Subjective:      Ángel Gonsalez Jr. is a 75 y.o. male who presents with Arthritis            HPI 1.  Chronic pain-patient continues to experience pain in both legs with walking and his distance is limited to 1 block before he has to sit down and rest.  Also notices pain with weightbearing in both knees although the knees are not significantly swelling.  2.  Panlobular emphysema-patient continues to smoke about 1 pack of cigarettes per day.  He is using Anoro daily and does have his rescue inhaler, albuterol, which in the heat when he is walking he will use 3 times per day.  Denies current daily cough or any hemoptysis.    ROSneg for chest pain, palpitations, ankle edema       Objective:     /48 (BP Location: Left arm, Patient Position: Sitting, BP Cuff Size: Adult)   Pulse 76   Temp 36.1 °C (97 °F) (Temporal)   Resp 16   Ht 1.727 m (5' 7.99\")   Wt 80.3 kg (177 lb)   SpO2 96%   BMI 26.92 kg/m²      Physical Exam  Gen.- alert, cooperative, in no acute distress  Neck- midline trachea, thyroid not enlarged or tender,supple, no cervical adenopathy  Chest-clear to auscultation and percussion with normal breath sounds. No retractions. Chest wall nontender  Cardiac- regular rhythm and rate. No murmur, thrill, or heave  Knees-negative for effusion or external tenderness to palpation bilaterally                 Assessment/Plan:        1. Arthritis    - HYDROcodone-acetaminophen (NORCO) 5-325 MG Tab per tablet; Take 1 tablet by mouth every 8 hours as needed for up to 30 days. Prescription must last 30 days.  Dispense: 90 tablet; Refill: 0  - HYDROcodone-acetaminophen (NORCO) 5-325 MG Tab per tablet; Take 1 tablet by mouth every 8 hours as needed for up to 30 days.  Dispense: 90 tablet; Refill: 0  - HYDROcodone-acetaminophen (NORCO) 5-325 MG Tab per tablet; Take 1 tablet by mouth every 8 hours as needed for up to 30 days.  Dispense: 90 tablet; Refill: 0    2. Foraminal stenosis of lumbar region    - " HYDROcodone-acetaminophen (NORCO) 5-325 MG Tab per tablet; Take 1 tablet by mouth every 8 hours as needed for up to 30 days. Prescription must last 30 days.  Dispense: 90 tablet; Refill: 0  - HYDROcodone-acetaminophen (NORCO) 5-325 MG Tab per tablet; Take 1 tablet by mouth every 8 hours as needed for up to 30 days.  Dispense: 90 tablet; Refill: 0  - HYDROcodone-acetaminophen (NORCO) 5-325 MG Tab per tablet; Take 1 tablet by mouth every 8 hours as needed for up to 30 days.  Dispense: 90 tablet; Refill: 0    3. Panlobular emphysema (HCC)    - umeclidinium-vilanterol (ANORO ELLIPTA) 62.5-25 MCG/INH AEROSOL POWDER, BREATH ACTIVATED inhaler; Inhale 1 Puff every day.  Dispense: 1 Each; Refill: 11  Plan: 1.  Renew Anoro, Norco (3 months)  2.  Trial of glucosamine joint supplement for knee pain  3.  Patient is encouraged to reduce tobacco  4.  Follow-up with me in 3 months

## 2021-08-23 ENCOUNTER — SLEEP CENTER VISIT (OUTPATIENT)
Dept: SLEEP MEDICINE | Facility: MEDICAL CENTER | Age: 76
End: 2021-08-23
Payer: MEDICARE

## 2021-08-23 VITALS
HEIGHT: 68 IN | RESPIRATION RATE: 16 BRPM | OXYGEN SATURATION: 96 % | BODY MASS INDEX: 26.67 KG/M2 | HEART RATE: 68 BPM | SYSTOLIC BLOOD PRESSURE: 128 MMHG | DIASTOLIC BLOOD PRESSURE: 60 MMHG | WEIGHT: 176 LBS

## 2021-08-23 DIAGNOSIS — I10 ESSENTIAL HYPERTENSION: ICD-10-CM

## 2021-08-23 DIAGNOSIS — F17.200 CURRENT SMOKER: ICD-10-CM

## 2021-08-23 DIAGNOSIS — J43.1 PANLOBULAR EMPHYSEMA (HCC): Chronic | ICD-10-CM

## 2021-08-23 DIAGNOSIS — G47.34 NOCTURNAL HYPOXIA: Chronic | ICD-10-CM

## 2021-08-23 DIAGNOSIS — Z87.891 PERSONAL HISTORY OF NICOTINE DEPENDENCE: ICD-10-CM

## 2021-08-23 DIAGNOSIS — Z72.0 TOBACCO ABUSE: Chronic | ICD-10-CM

## 2021-08-23 PROCEDURE — 99214 OFFICE O/P EST MOD 30 MIN: CPT | Performed by: NURSE PRACTITIONER

## 2021-08-23 RX ORDER — IPRATROPIUM BROMIDE AND ALBUTEROL SULFATE 2.5; .5 MG/3ML; MG/3ML
3 SOLUTION RESPIRATORY (INHALATION) EVERY 6 HOURS PRN
Qty: 360 ML | Refills: 5 | Status: SHIPPED | OUTPATIENT
Start: 2021-08-23 | End: 2021-10-21

## 2021-08-23 RX ORDER — FLUTICASONE FUROATE 100 UG/1
1 POWDER RESPIRATORY (INHALATION) DAILY
Qty: 2 EACH | Refills: 0 | COMMUNITY
Start: 2021-08-23 | End: 2021-08-23

## 2021-08-23 RX ORDER — PREDNISONE 10 MG/1
TABLET ORAL
Qty: 18 TABLET | Refills: 0 | Status: SHIPPED | OUTPATIENT
Start: 2021-08-23 | End: 2021-10-21

## 2021-08-23 RX ORDER — FLUTICASONE FUROATE 200 UG/1
1 POWDER RESPIRATORY (INHALATION) DAILY
Qty: 2 EACH | Refills: 0 | COMMUNITY
Start: 2021-08-23 | End: 2021-10-21

## 2021-08-23 ASSESSMENT — FIBROSIS 4 INDEX: FIB4 SCORE: 1.06
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
## 2021-08-23 NOTE — PATIENT INSTRUCTIONS
Start prednisone taper now for wheezing    Continue using Anoro 1 puff daily and add trial of arnuity 1 puff daily, rinse mouth after use. If this is beneficial, will place RX for to check cost. Please call office to let me know.    Start using nebulizer every 4-6hrs as needed, don't use albutero HFA at the same time

## 2021-09-07 DIAGNOSIS — M19.90 ARTHRITIS: ICD-10-CM

## 2021-09-07 DIAGNOSIS — M48.061 FORAMINAL STENOSIS OF LUMBAR REGION: ICD-10-CM

## 2021-09-09 RX ORDER — HYDROCODONE BITARTRATE AND ACETAMINOPHEN 5; 325 MG/1; MG/1
1 TABLET ORAL EVERY 8 HOURS PRN
Qty: 90 TABLET | Refills: 0 | Status: SHIPPED | OUTPATIENT
Start: 2021-09-09 | End: 2021-09-30 | Stop reason: SDUPTHER

## 2021-09-30 ENCOUNTER — HOSPITAL ENCOUNTER (OUTPATIENT)
Facility: MEDICAL CENTER | Age: 76
End: 2021-09-30
Attending: FAMILY MEDICINE
Payer: MEDICARE

## 2021-09-30 ENCOUNTER — OFFICE VISIT (OUTPATIENT)
Dept: MEDICAL GROUP | Facility: MEDICAL CENTER | Age: 76
End: 2021-09-30
Attending: FAMILY MEDICINE
Payer: MEDICARE

## 2021-09-30 VITALS
DIASTOLIC BLOOD PRESSURE: 60 MMHG | BODY MASS INDEX: 26.78 KG/M2 | RESPIRATION RATE: 16 BRPM | HEIGHT: 68 IN | TEMPERATURE: 97.9 F | WEIGHT: 176.7 LBS | HEART RATE: 72 BPM | SYSTOLIC BLOOD PRESSURE: 118 MMHG | OXYGEN SATURATION: 95 %

## 2021-09-30 DIAGNOSIS — M19.90 ARTHRITIS: ICD-10-CM

## 2021-09-30 DIAGNOSIS — M48.061 FORAMINAL STENOSIS OF LUMBAR REGION: ICD-10-CM

## 2021-09-30 DIAGNOSIS — Z72.0 TOBACCO ABUSE: ICD-10-CM

## 2021-09-30 DIAGNOSIS — M25.50 PAIN IN JOINT, MULTIPLE SITES: ICD-10-CM

## 2021-09-30 DIAGNOSIS — J43.1 PANLOBULAR EMPHYSEMA (HCC): ICD-10-CM

## 2021-09-30 DIAGNOSIS — Z95.5 S/P CORONARY ARTERY STENT PLACEMENT: ICD-10-CM

## 2021-09-30 DIAGNOSIS — I10 ESSENTIAL HYPERTENSION: ICD-10-CM

## 2021-09-30 PROCEDURE — G0439 PPPS, SUBSEQ VISIT: HCPCS | Performed by: FAMILY MEDICINE

## 2021-09-30 PROCEDURE — 99213 OFFICE O/P EST LOW 20 MIN: CPT | Performed by: FAMILY MEDICINE

## 2021-09-30 PROCEDURE — 80307 DRUG TEST PRSMV CHEM ANLYZR: CPT

## 2021-09-30 RX ORDER — HYDROCODONE BITARTRATE AND ACETAMINOPHEN 5; 325 MG/1; MG/1
1 TABLET ORAL EVERY 6 HOURS PRN
Qty: 90 TABLET | Refills: 0 | Status: SHIPPED | OUTPATIENT
Start: 2021-11-02 | End: 2021-12-02

## 2021-09-30 RX ORDER — HYDROCODONE BITARTRATE AND ACETAMINOPHEN 5; 325 MG/1; MG/1
1 TABLET ORAL EVERY 6 HOURS PRN
Qty: 90 TABLET | Refills: 0 | Status: SHIPPED | OUTPATIENT
Start: 2021-12-02 | End: 2021-10-21

## 2021-09-30 RX ORDER — HYDROCODONE BITARTRATE AND ACETAMINOPHEN 5; 325 MG/1; MG/1
1 TABLET ORAL EVERY 8 HOURS PRN
Qty: 90 TABLET | Refills: 0 | Status: SHIPPED | OUTPATIENT
Start: 2021-10-03 | End: 2021-10-21

## 2021-09-30 ASSESSMENT — FIBROSIS 4 INDEX: FIB4 SCORE: 1.06
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
## 2021-10-05 LAB

## 2021-10-21 ENCOUNTER — OFFICE VISIT (OUTPATIENT)
Dept: CARDIOLOGY | Facility: MEDICAL CENTER | Age: 76
End: 2021-10-21
Payer: MEDICARE

## 2021-10-21 VITALS
HEIGHT: 68 IN | OXYGEN SATURATION: 97 % | DIASTOLIC BLOOD PRESSURE: 62 MMHG | SYSTOLIC BLOOD PRESSURE: 120 MMHG | WEIGHT: 177 LBS | RESPIRATION RATE: 16 BRPM | HEART RATE: 70 BPM | BODY MASS INDEX: 26.83 KG/M2

## 2021-10-21 DIAGNOSIS — Z95.1 S/P CABG (CORONARY ARTERY BYPASS GRAFT): ICD-10-CM

## 2021-10-21 DIAGNOSIS — E78.5 DYSLIPIDEMIA: ICD-10-CM

## 2021-10-21 DIAGNOSIS — I25.10 CORONARY ARTERY DISEASE, OCCLUSIVE: ICD-10-CM

## 2021-10-21 DIAGNOSIS — I10 ESSENTIAL HYPERTENSION: ICD-10-CM

## 2021-10-21 DIAGNOSIS — Z95.5 S/P CORONARY ARTERY STENT PLACEMENT: ICD-10-CM

## 2021-10-21 PROBLEM — R55 NEAR SYNCOPE: Status: RESOLVED | Noted: 2020-06-06 | Resolved: 2021-10-21

## 2021-10-21 PROBLEM — R07.2 PRECORDIAL PAIN: Status: RESOLVED | Noted: 2019-04-11 | Resolved: 2021-10-21

## 2021-10-21 PROBLEM — R73.9 HYPERGLYCEMIA: Status: RESOLVED | Noted: 2019-12-02 | Resolved: 2021-10-21

## 2021-10-21 PROCEDURE — 99214 OFFICE O/P EST MOD 30 MIN: CPT | Performed by: INTERNAL MEDICINE

## 2021-10-21 ASSESSMENT — FIBROSIS 4 INDEX: FIB4 SCORE: 1.06

## 2021-10-21 ASSESSMENT — ENCOUNTER SYMPTOMS
CLAUDICATION: 1
PALPITATIONS: 0
SHORTNESS OF BREATH: 0
COUGH: 1

## 2021-10-21 NOTE — PROGRESS NOTES
Chief Complaint   Patient presents with   • Coronary Artery Disease     F/V Dx: Coronary artery disease involving native coronary artery of native heart without angina pectoris   • Peripheral Vascular Disease (PVD)   • Dyslipidemia       Subjective:   Fredy Gonsalez Jr. is a 74 y.o. male who presents today for follow-up evaluation of CAD, CABG, coronary intervention, myocardial infarction and hyperlipidemia.    Since 2020 with [facility] [place] the patient has had no cardiac symptoms.  Continues to smoke 1 PPD.  Has daily pulmonary congestion, productive cough and WASSERMAN without angina.  Limited by claudication which is unchanged.  States that Chantix made him sick and he is tried stop with hypnosis x2.    Family history  No heart disease.  Father  age 32 of kidney disease.    Past Medical History:   Diagnosis Date   • Arthritis    • COPD    • EMPHYSEMA    • Essential hypertension 2019   • Heart attack (HCC) 2017    received 3 stents, [facility]   • Hyperlipidemia 2012   • Pain in joint, multiple sites      Past Surgical History:   Procedure Laterality Date   • STENT PLACEMENT  2017    left main to proximal LAD, mid LAD, ostial left main   • OPEN REDUCTION      left arm. 32 yrs ago     Family History   Problem Relation Age of Onset   • Arthritis Mother    • Lung Disease Mother    • Osteoporosis Mother    • Alzheimer's Disease Mother    • Other Mother         parkinsons   • Kidney Disease Father    • Cancer Neg Hx    • Diabetes Neg Hx    • Heart Disease Neg Hx    • Stroke Neg Hx      Social History     Socioeconomic History   • Marital status: Single     Spouse name: Not on file   • Number of children: Not on file   • Years of education: Not on file   • Highest education level: Not on file   Occupational History   • Not on file   Tobacco Use   • Smoking status: Current Every Day Smoker     Packs/day: 1.50     Years: 60.00     Pack years: 90.00     Types: Cigarettes     Start  date: 6/15/1957   • Smokeless tobacco: Former User     Types: Chew     Quit date: 1957   Vaping Use   • Vaping Use: Never used   Substance and Sexual Activity   • Alcohol use: No     Alcohol/week: 0.0 oz     Comment: quit 28yrs ago   • Drug use: No     Types: Marijuana     Comment: quit in 1969, THC Hash    • Sexual activity: Never     Partners: Female     Birth control/protection: Condom   Other Topics Concern   • Not on file   Social History Narrative   • Not on file     Social Determinants of Health     Financial Resource Strain:    • Difficulty of Paying Living Expenses:    Food Insecurity:    • Worried About Running Out of Food in the Last Year:    • Ran Out of Food in the Last Year:    Transportation Needs:    • Lack of Transportation (Medical):    • Lack of Transportation (Non-Medical):    Physical Activity:    • Days of Exercise per Week:    • Minutes of Exercise per Session:    Stress:    • Feeling of Stress :    Social Connections:    • Frequency of Communication with Friends and Family:    • Frequency of Social Gatherings with Friends and Family:    • Attends Baptism Services:    • Active Member of Clubs or Organizations:    • Attends Club or Organization Meetings:    • Marital Status:    Intimate Partner Violence:    • Fear of Current or Ex-Partner:    • Emotionally Abused:    • Physically Abused:    • Sexually Abused:      Allergies   Allergen Reactions   • Chantix [Varenicline] Nausea     Made patient feel sick.   • Fish Vomiting     Outpatient Encounter Medications as of 10/21/2021   Medication Sig Dispense Refill   • [START ON 11/2/2021] HYDROcodone-acetaminophen (NORCO) 5-325 MG Tab per tablet Take 1 Tablet by mouth every 6 hours as needed for up to 30 days. 90 Tablet 0   • umeclidinium-vilanterol (ANORO ELLIPTA) 62.5-25 MCG/INH AEROSOL POWDER, BREATH ACTIVATED inhaler Inhale 1 Puff every day. 1 Each 11   • clopidogrel (PLAVIX) 75 MG Tab Take 1 tablet by mouth every day. 30 tablet 11   •  atorvastatin (LIPITOR) 40 MG Tab Take 1 tablet by mouth every day. 30 tablet 11   • lisinopril (PRINIVIL) 5 MG Tab Take 1 tablet by mouth every day. 30 tablet 11   • metoprolol SR (TOPROL XL) 50 MG TABLET SR 24 HR Take 1 tablet by mouth every day. 30 tablet 11   • albuterol 108 (90 Base) MCG/ACT Aero Soln inhalation aerosol Inhale 2 Puffs every 6 hours as needed for Shortness of Breath. 8.5 g 11   • aspirin EC (ECOTRIN) 81 MG Tablet Delayed Response Take 81 mg by mouth every day.     • [DISCONTINUED] HYDROcodone-acetaminophen (NORCO) 5-325 MG Tab per tablet Take 1 Tablet by mouth every 8 hours as needed for up to 30 days. (Patient not taking: Reported on 10/21/2021) 90 Tablet 0   • [DISCONTINUED] HYDROcodone-acetaminophen (NORCO) 5-325 MG Tab per tablet Take 1 Tablet by mouth every 6 hours as needed for up to 30 days. (Patient not taking: Reported on 10/21/2021) 90 Tablet 0   • [DISCONTINUED] ipratropium-albuterol (DUONEB) 0.5-2.5 (3) MG/3ML nebulizer solution Take 3 mL by nebulization every 6 hours as needed for Shortness of Breath for up to 180 days. (Patient not taking: Reported on 10/21/2021) 360 mL 5   • [DISCONTINUED] predniSONE (DELTASONE) 10 MG Tab Take 30mg x 3 days, then take 20mg x 3 days, then take 10mg x 3 days, with food, then discontinue. (Patient not taking: Reported on 10/21/2021) 18 Tablet 0   • [DISCONTINUED] Fluticasone Furoate (ARNUITY ELLIPTA) 200 MCG/ACT AEROSOL POWDER, BREATH ACTIVATED Inhale 1 Puff every day. Rinse mouth after each use. (Patient not taking: Reported on 10/21/2021) 2 Each 0     No facility-administered encounter medications on file as of 10/21/2021.     Review of Systems   Respiratory: Positive for cough. Negative for shortness of breath.    Cardiovascular: Positive for chest pain and claudication. Negative for palpitations and leg swelling.        Objective:   /62 (BP Location: Left arm, Patient Position: Sitting, BP Cuff Size: Adult)   Pulse 70   Resp 16   Ht 1.727  "m (5' 7.99\")   Wt 80.3 kg (177 lb)   SpO2 97%   BMI 26.92 kg/m²     Physical Exam  Constitutional:       Appearance: He is well-developed.   HENT:      Head: Normocephalic and atraumatic.   Eyes:      Conjunctiva/sclera: Conjunctivae normal.      Pupils: Pupils are equal, round, and reactive to light.   Neck:      Vascular: No JVD.   Cardiovascular:      Rate and Rhythm: Normal rate and regular rhythm.      Pulses:           Carotid pulses are 0 on the right side and 1+ on the left side.       Radial pulses are 0 on the right side and 1+ on the left side.        Femoral pulses are 1+ on the right side with bruit and 1+ on the left side with bruit.       Posterior tibial pulses are 1+ on the right side and 1+ on the left side.      Heart sounds: Normal heart sounds. No murmur heard.   No friction rub. No gallop.    Pulmonary:      Effort: Pulmonary effort is normal. No accessory muscle usage or respiratory distress.      Breath sounds: Wheezing and rhonchi present. No rales.   Musculoskeletal:         General: No deformity. Normal range of motion.      Cervical back: Normal range of motion and neck supple.   Skin:     General: Skin is warm and dry.      Findings: No rash.      Nails: There is no clubbing.   Neurological:      Mental Status: He is alert and oriented to person, place, and time.      Cranial Nerves: No cranial nerve deficit.      Motor: No abnormal muscle tone.   Psychiatric:         Behavior: Behavior normal.     ECHOCARDIOGRAM 04/30/2019  No prior study is available for comparison.   Normal left ventricular systolic function. Left ventricular ejection   fraction is visually estimated to be 55%.   Indeterminate diastolic function.    MPI 04/30/2019  Normal inferior vena cava size and inspiratory collapse.    * Small sized, moderate severity, partially reversible defect in the distal    to mid inferior wall. SDS of 2, SSS of 4.    * Normal left ventricular size, ejection fraction, and wall motion.   " ECG INTERPRETATION   Negative stress ECG for ischemia.     10/09/2018 EKG: Normal sinus rhythm, rate 61.  Inferior posterior myocardial infarction.  Reviewed by myself.    Assessment:     1. Coronary artery disease, occlusive     2. S/P CABG (coronary artery bypass graft)     3. S/P coronary artery stent placement     4. Essential hypertension     5. Dyslipidemia  LIPID PANEL       Medical Decision Making:  Today's Assessment / Status / Plan:     Assessment  1.  CAD.  2.  S/P CABG LIMA-LAD, SVG OM-D1 12/1/2017 , [place], [place] early failed LIMA requiring PCI left main, pLAD,mLAD 12/2/2017  3.  Hypertension.  4.  Dyslipidemia.  5.  PVD with claudication.  6.  Chronic tobacco use.  7.  COPD.  8.  Severe dental caries.    Recommendation Discussion  1.  Clinically stable from a standpoint of his CAD, hypertension dyslipidemia.  2.  Instructed patient on smoking cessation I spent 5 minutes discussing the need for smoking/tobacco cessation. We again discussed measures for quitting including replacements such as nicotine gum/nicotine patch, Chantix however he is uninterested in stopping smoking at this time and has not had prior success with Chantix or nicotine supplements.  3.  Continue current cardiac therapy with metoprolol, aspirin, Plavix, lisinopril and atorvastatin  4.  Check lipid panel.  5.  RTC 10 months

## 2021-11-01 ENCOUNTER — HOSPITAL ENCOUNTER (OUTPATIENT)
Dept: LAB | Facility: MEDICAL CENTER | Age: 76
End: 2021-11-01
Attending: INTERNAL MEDICINE
Payer: MEDICARE

## 2021-11-01 LAB
CHOLEST SERPL-MCNC: 133 MG/DL (ref 100–199)
FASTING STATUS PATIENT QL REPORTED: NORMAL
HDLC SERPL-MCNC: 38 MG/DL
LDLC SERPL CALC-MCNC: 78 MG/DL
TRIGL SERPL-MCNC: 85 MG/DL (ref 0–149)

## 2021-11-01 PROCEDURE — 36415 COLL VENOUS BLD VENIPUNCTURE: CPT

## 2021-11-01 PROCEDURE — 80061 LIPID PANEL: CPT

## 2022-01-03 ENCOUNTER — APPOINTMENT (OUTPATIENT)
Dept: RADIOLOGY | Facility: MEDICAL CENTER | Age: 77
End: 2022-01-03
Attending: NURSE PRACTITIONER
Payer: MEDICARE

## 2022-05-05 ENCOUNTER — TELEPHONE (OUTPATIENT)
Dept: MEDICAL GROUP | Facility: MEDICAL CENTER | Age: 77
End: 2022-05-05
Payer: MEDICARE

## 2022-05-05 DIAGNOSIS — J43.1 PANLOBULAR EMPHYSEMA (HCC): ICD-10-CM

## 2022-05-05 RX ORDER — GLYCOPYRROLATE AND FORMOTEROL FUMARATE 9; 4.8 UG/1; UG/1
2 AEROSOL, METERED RESPIRATORY (INHALATION) 2 TIMES DAILY
Qty: 1 EACH | Refills: 11 | Status: SHIPPED | OUTPATIENT
Start: 2022-05-05

## 2022-05-06 NOTE — TELEPHONE ENCOUNTER
Patients preferred phone number will not go through, called home phone and person stated that the office had the wrong number (removed from chart).    Letter sent on 05/06/2022

## 2022-05-06 NOTE — TELEPHONE ENCOUNTER
Please let patient know that since his health plan is not covering and Noro Ellipta we will try an alternative, Bevespi, if it gives him poor performance please let me know.  Dr. Umaña

## 2022-05-12 ENCOUNTER — TELEPHONE (OUTPATIENT)
Dept: MEDICAL GROUP | Facility: MEDICAL CENTER | Age: 77
End: 2022-05-12
Payer: MEDICARE

## 2022-05-12 DIAGNOSIS — J43.1 PANLOBULAR EMPHYSEMA (HCC): ICD-10-CM

## 2022-06-22 DIAGNOSIS — J43.1 PANLOBULAR EMPHYSEMA (HCC): ICD-10-CM

## 2022-11-10 ENCOUNTER — TELEPHONE (OUTPATIENT)
Dept: HEALTH INFORMATION MANAGEMENT | Facility: OTHER | Age: 77
End: 2022-11-10

## 2023-08-28 DIAGNOSIS — I25.10 CORONARY ARTERY DISEASE, OCCLUSIVE: ICD-10-CM
